# Patient Record
Sex: FEMALE | Race: BLACK OR AFRICAN AMERICAN | NOT HISPANIC OR LATINO | Employment: OTHER | URBAN - METROPOLITAN AREA
[De-identification: names, ages, dates, MRNs, and addresses within clinical notes are randomized per-mention and may not be internally consistent; named-entity substitution may affect disease eponyms.]

---

## 2017-03-13 ENCOUNTER — HISTORICAL (OUTPATIENT)
Dept: OCCUPATIONAL THERAPY | Facility: HOSPITAL | Age: 62
End: 2017-03-13

## 2017-03-24 ENCOUNTER — HISTORICAL (OUTPATIENT)
Dept: OCCUPATIONAL THERAPY | Facility: HOSPITAL | Age: 62
End: 2017-03-24

## 2017-03-27 ENCOUNTER — HISTORICAL (OUTPATIENT)
Dept: OCCUPATIONAL THERAPY | Facility: HOSPITAL | Age: 62
End: 2017-03-27

## 2017-03-29 ENCOUNTER — HISTORICAL (OUTPATIENT)
Dept: OCCUPATIONAL THERAPY | Facility: HOSPITAL | Age: 62
End: 2017-03-29

## 2017-04-05 ENCOUNTER — HISTORICAL (OUTPATIENT)
Dept: OCCUPATIONAL THERAPY | Facility: HOSPITAL | Age: 62
End: 2017-04-05

## 2017-04-07 ENCOUNTER — HISTORICAL (OUTPATIENT)
Dept: OCCUPATIONAL THERAPY | Facility: HOSPITAL | Age: 62
End: 2017-04-07

## 2017-04-10 ENCOUNTER — HISTORICAL (OUTPATIENT)
Dept: OCCUPATIONAL THERAPY | Facility: HOSPITAL | Age: 62
End: 2017-04-10

## 2017-04-12 ENCOUNTER — HISTORICAL (OUTPATIENT)
Dept: OCCUPATIONAL THERAPY | Facility: HOSPITAL | Age: 62
End: 2017-04-12

## 2017-08-22 ENCOUNTER — HISTORICAL (OUTPATIENT)
Dept: LAB | Facility: HOSPITAL | Age: 62
End: 2017-08-22

## 2017-08-22 LAB
ABS NEUT (OLG): 1.88
ALBUMIN SERPL-MCNC: 3.8 GM/DL (ref 3.4–5)
ALBUMIN/GLOB SERPL: 1 RATIO (ref 1.1–2)
ALP SERPL-CCNC: 46 UNIT/L (ref 50–136)
ALT SERPL-CCNC: 40 UNIT/L (ref 12–78)
AST SERPL-CCNC: 30 UNIT/L (ref 10–37)
BASOPHILS NFR BLD MANUAL: 1 % (ref 0–2)
BILIRUB SERPL-MCNC: 0.4 MG/DL (ref 0.2–1)
BILIRUBIN DIRECT+TOT PNL SERPL-MCNC: 0.17 MG/DL (ref 0.05–0.2)
BILIRUBIN DIRECT+TOT PNL SERPL-MCNC: 0.23 MG/DL
BUN SERPL-MCNC: 11 MG/DL (ref 7–18)
CALCIUM SERPL-MCNC: 10.2 MG/DL (ref 8.5–10.1)
CHLORIDE SERPL-SCNC: 105 MMOL/L (ref 98–107)
CHOLEST SERPL-MCNC: 151 MG/DL (ref 50–200)
CHOLEST/HDLC SERPL: 2 {RATIO} (ref 0–5)
CO2 SERPL-SCNC: 27.8 MMOL/L (ref 21–32)
CREAT SERPL-MCNC: 0.82 MG/DL (ref 0.55–1.02)
EOSINOPHIL NFR BLD MANUAL: 1 % (ref 0–8)
ERYTHROCYTE [DISTWIDTH] IN BLOOD BY AUTOMATED COUNT: 13 %
GLOBULIN SER-MCNC: 3.9 GM/DL (ref 2.4–3.5)
GLUCOSE SERPL-MCNC: 105 MG/DL (ref 74–106)
GRANULOCYTES NFR BLD MANUAL: 38 % (ref 47–80)
HCT VFR BLD AUTO: 38.7 % (ref 34–46)
HDLC SERPL-MCNC: 63 MG/DL (ref 35–60)
HGB BLD-MCNC: 12.8 GM/DL (ref 11.3–15.4)
LDLC SERPL CALC-MCNC: 67 MG/DL (ref 50–140)
LYMPHOCYTES NFR BLD MANUAL: 50 % (ref 13–40)
MCH RBC QN AUTO: 30.9 PG (ref 27–33)
MCHC RBC AUTO-ENTMCNC: 33.1 GM/DL (ref 32–35)
MCV RBC AUTO: 93.5 FL (ref 81–97)
MONOCYTES NFR BLD MANUAL: 10 % (ref 2–11)
PLATELET # BLD AUTO: 168 X10(3)/MCL (ref 151–368)
PLATELET # BLD EST: ADEQUATE 10*3/UL
PMV BLD AUTO: 10 FL
POTASSIUM SERPL-SCNC: 5 MMOL/L (ref 3.5–5.1)
PROT SERPL-MCNC: 7.7 GM/DL (ref 6.4–8.2)
RBC # BLD AUTO: 4.14 X10(6)/MCL (ref 3.9–5)
SODIUM SERPL-SCNC: 140 MMOL/L (ref 136–145)
TRIGL SERPL-MCNC: 106 MG/DL (ref 30–150)
VLDLC SERPL CALC-MCNC: 21 MG/DL
WBC # SPEC AUTO: 4.93 X10(3)/MCL (ref 3.4–9.2)

## 2017-09-11 ENCOUNTER — HISTORICAL (OUTPATIENT)
Dept: RADIOLOGY | Facility: HOSPITAL | Age: 62
End: 2017-09-11

## 2017-10-17 ENCOUNTER — HISTORICAL (OUTPATIENT)
Dept: RADIOLOGY | Facility: HOSPITAL | Age: 62
End: 2017-10-17

## 2018-08-15 ENCOUNTER — HISTORICAL (OUTPATIENT)
Dept: LAB | Facility: HOSPITAL | Age: 63
End: 2018-08-15

## 2018-08-15 LAB
ABS NEUT (OLG): 0.91
ALBUMIN SERPL-MCNC: 4.1 GM/DL (ref 3.4–5)
ALBUMIN/GLOB SERPL: 1.1 RATIO (ref 1.1–2)
ALP SERPL-CCNC: 47 UNIT/L (ref 46–116)
ALT SERPL-CCNC: 30 UNIT/L (ref 12–78)
AMYLASE SERPL-CCNC: 109 UNIT/L (ref 25–115)
AST SERPL-CCNC: 20 UNIT/L (ref 10–37)
BILIRUB SERPL-MCNC: 0.7 MG/DL (ref 0.2–1)
BILIRUBIN DIRECT+TOT PNL SERPL-MCNC: 0.3 MG/DL (ref 0–0.2)
BILIRUBIN DIRECT+TOT PNL SERPL-MCNC: 0.4 MG/DL
BUN SERPL-MCNC: 8 MG/DL (ref 7–18)
CALCIUM SERPL-MCNC: 10.5 MG/DL (ref 8.5–10.1)
CHLORIDE SERPL-SCNC: 103 MMOL/L (ref 98–107)
CHOLEST SERPL-MCNC: 179 MG/DL (ref 50–200)
CHOLEST/HDLC SERPL: 2 {RATIO} (ref 0–5)
CO2 SERPL-SCNC: 30.2 MMOL/L (ref 21–32)
CREAT SERPL-MCNC: 1 MG/DL (ref 0.55–1.02)
ERYTHROCYTE [DISTWIDTH] IN BLOOD BY AUTOMATED COUNT: 12 %
GLOBULIN SER-MCNC: 3.9 GM/DL (ref 2.4–3.5)
GLUCOSE SERPL-MCNC: 101 MG/DL (ref 74–106)
GRANULOCYTES NFR BLD MANUAL: 28 % (ref 47–80)
HCT VFR BLD AUTO: 40.9 % (ref 34–46)
HDLC SERPL-MCNC: 74 MG/DL (ref 35–60)
HGB BLD-MCNC: 14 GM/DL (ref 11.3–15.4)
LDLC SERPL CALC-MCNC: 90 MG/DL (ref 50–140)
LYMPHOCYTES NFR BLD MANUAL: 62 % (ref 13–40)
MCH RBC QN AUTO: 31.2 PG (ref 27–33)
MCHC RBC AUTO-ENTMCNC: 34.2 GM/DL (ref 32–35)
MCV RBC AUTO: 91.1 FL (ref 81–97)
MONOCYTES NFR BLD MANUAL: 9 % (ref 2–11)
NEUTS BAND NFR BLD MANUAL: 1 % (ref 0–11)
PLATELET # BLD AUTO: 197 X10(3)/MCL (ref 151–368)
PLATELET # BLD EST: ADEQUATE 10*3/UL
PMV BLD AUTO: 10 FL
POTASSIUM SERPL-SCNC: 4.7 MMOL/L (ref 3.5–5.1)
PROT SERPL-MCNC: 8 GM/DL (ref 6.4–8.2)
RBC # BLD AUTO: 4.49 X10(6)/MCL (ref 3.9–5)
SODIUM SERPL-SCNC: 139 MMOL/L (ref 136–145)
TRIGL SERPL-MCNC: 76 MG/DL (ref 30–150)
VLDLC SERPL CALC-MCNC: 15 MG/DL
WBC # SPEC AUTO: 3.96 X10(3)/MCL (ref 3.4–9.2)

## 2018-08-21 ENCOUNTER — HISTORICAL (OUTPATIENT)
Dept: RADIOLOGY | Facility: HOSPITAL | Age: 63
End: 2018-08-21

## 2020-01-23 ENCOUNTER — HOSPITAL ENCOUNTER (OUTPATIENT)
Dept: MEDSURG UNIT | Facility: HOSPITAL | Age: 65
End: 2020-01-25
Attending: INTERNAL MEDICINE | Admitting: FAMILY MEDICINE

## 2020-01-24 LAB — VANCOMYCIN TROUGH SERPL-MCNC: 6.5 UG/ML (ref 5–10)

## 2020-01-25 LAB
ABS NEUT (OLG): 2.16
ALBUMIN SERPL-MCNC: 2.9 GM/DL (ref 3.2–4.6)
ALBUMIN/GLOB SERPL: 0.7 RATIO (ref 1.1–2)
ALP SERPL-CCNC: 44 UNIT/L (ref 40–150)
ALT SERPL-CCNC: 12 UNIT/L (ref 0–55)
AST SERPL-CCNC: 14 UNIT/L (ref 5–34)
BASOPHILS NFR BLD MANUAL: 1 % (ref 0–2)
BILIRUB SERPL-MCNC: 0.3 MG/DL
BILIRUBIN DIRECT+TOT PNL SERPL-MCNC: 0.1 MG/DL
BILIRUBIN DIRECT+TOT PNL SERPL-MCNC: 0.2 MG/DL (ref 0–0.5)
BUN SERPL-MCNC: 4 MG/DL (ref 9.8–20.1)
CALCIUM SERPL-MCNC: 9.8 MG/DL (ref 8.4–10.2)
CHLORIDE SERPL-SCNC: 107 MMOL/L (ref 98–107)
CO2 SERPL-SCNC: 28 MEQ/L (ref 23–31)
CREAT SERPL-MCNC: 0.63 MG/DL (ref 0.55–1.02)
EOSINOPHIL NFR BLD MANUAL: 2 % (ref 0–8)
ERYTHROCYTE [DISTWIDTH] IN BLOOD BY AUTOMATED COUNT: 12 %
GLOBULIN SER-MCNC: 4 GM/DL (ref 2.4–3.5)
GLUCOSE SERPL-MCNC: 95 MG/DL (ref 82–115)
GRANULOCYTES NFR BLD MANUAL: 48 % (ref 47–80)
HCT VFR BLD AUTO: 35.2 % (ref 34–46)
HGB BLD-MCNC: 11.2 GM/DL (ref 11.3–15.4)
LYMPHOCYTES NFR BLD MANUAL: 33 % (ref 13–40)
MAGNESIUM SERPL-MCNC: 1.92 MG/DL (ref 1.6–2.6)
MCH RBC QN AUTO: 30.4 PG (ref 27–33)
MCHC RBC AUTO-ENTMCNC: 31.8 GM/DL (ref 32–35)
MCV RBC AUTO: 95.4 FL (ref 81–97)
MONOCYTES NFR BLD MANUAL: 16 % (ref 2–11)
PLATELET # BLD AUTO: 195 X10(3)/MCL (ref 140–450)
PMV BLD AUTO: 9 FL
POTASSIUM SERPL-SCNC: 4 MMOL/L (ref 3.5–5.1)
PROT SERPL-MCNC: 6.9 GM/DL (ref 5.8–7.6)
RBC # BLD AUTO: 3.69 X10(6)/MCL (ref 3.9–5)
SODIUM SERPL-SCNC: 142 MMOL/L (ref 136–145)
VANCOMYCIN SERPL-MCNC: 9 UG/ML
WBC # SPEC AUTO: 4.53 X10(3)/MCL (ref 3.4–9.2)

## 2020-06-10 ENCOUNTER — HISTORICAL (OUTPATIENT)
Dept: RADIOLOGY | Facility: HOSPITAL | Age: 65
End: 2020-06-10

## 2020-07-30 ENCOUNTER — HISTORICAL (OUTPATIENT)
Dept: RADIOLOGY | Facility: HOSPITAL | Age: 65
End: 2020-07-30

## 2020-08-31 ENCOUNTER — HISTORICAL (OUTPATIENT)
Dept: LAB | Facility: HOSPITAL | Age: 65
End: 2020-08-31

## 2020-09-28 ENCOUNTER — HISTORICAL (OUTPATIENT)
Dept: LAB | Facility: HOSPITAL | Age: 65
End: 2020-09-28

## 2020-10-01 ENCOUNTER — HISTORICAL (OUTPATIENT)
Dept: MEDSURG UNIT | Facility: HOSPITAL | Age: 65
End: 2020-10-01

## 2020-10-01 LAB — CRC RECOMMENDATION EXT: NORMAL

## 2020-12-09 ENCOUNTER — HISTORICAL (OUTPATIENT)
Dept: LAB | Facility: HOSPITAL | Age: 65
End: 2020-12-09

## 2020-12-09 LAB
ABS NEUT (OLG): 1.96
ALBUMIN SERPL-MCNC: 3.9 GM/DL (ref 3.4–4.8)
ALBUMIN/GLOB SERPL: 1.1 RATIO (ref 1.1–2)
ALP SERPL-CCNC: 45 UNIT/L (ref 40–150)
ALT SERPL-CCNC: 28 UNIT/L (ref 0–55)
AST SERPL-CCNC: 26 UNIT/L (ref 5–34)
BASOPHILS # BLD AUTO: 0.03 X10(3)/MCL
BASOPHILS NFR BLD AUTO: 0.7 %
BILIRUB SERPL-MCNC: 0.5 MG/DL
BILIRUBIN DIRECT+TOT PNL SERPL-MCNC: 0.2 MG/DL (ref 0–0.5)
BILIRUBIN DIRECT+TOT PNL SERPL-MCNC: 0.3 MG/DL
BUN SERPL-MCNC: 8 MG/DL (ref 9.8–20.1)
CALCIUM SERPL-MCNC: 10.2 MG/DL (ref 8.4–10.2)
CHLORIDE SERPL-SCNC: 105 MMOL/L (ref 98–107)
CHOLEST SERPL-MCNC: 183 MG/DL
CHOLEST/HDLC SERPL: 3 {RATIO} (ref 0–5)
CO2 SERPL-SCNC: 30 MEQ/L (ref 23–31)
CREAT SERPL-MCNC: 0.77 MG/DL (ref 0.55–1.02)
EOSINOPHIL # BLD AUTO: 0.06 X10(3)/MCL
EOSINOPHIL NFR BLD AUTO: 1.5 %
ERYTHROCYTE [DISTWIDTH] IN BLOOD BY AUTOMATED COUNT: 13 %
GLOBULIN SER-MCNC: 3.4 GM/DL (ref 2.4–3.5)
GLUCOSE SERPL-MCNC: 97 MG/DL (ref 82–115)
HCT VFR BLD AUTO: 40.4 % (ref 34–46)
HDLC SERPL-MCNC: 60 MG/DL (ref 35–60)
HGB BLD-MCNC: 12.6 GM/DL (ref 11.3–15.4)
IMM GRANULOCYTES # BLD AUTO: 0 10*3/UL (ref 0–0.1)
IMM GRANULOCYTES NFR BLD AUTO: 0 % (ref 0–1)
LDLC SERPL CALC-MCNC: 80 MG/DL (ref 50–140)
LYMPHOCYTES # BLD AUTO: 1.66 X10(3)/MCL
LYMPHOCYTES NFR BLD AUTO: 40.7 %
MCH RBC QN AUTO: 29.9 PG (ref 27–33)
MCHC RBC AUTO-ENTMCNC: 31.2 GM/DL (ref 32–35)
MCV RBC AUTO: 95.7 FL (ref 81–97)
MONOCYTES # BLD AUTO: 0.37 X10(3)/MCL
MONOCYTES NFR BLD AUTO: 9.1 %
NEUTROPHILS # BLD AUTO: 1.96 X10(3)/MCL
NEUTROPHILS NFR BLD AUTO: 48 %
PLATELET # BLD AUTO: 187 X10(3)/MCL (ref 140–450)
PMV BLD AUTO: 11 FL
POTASSIUM SERPL-SCNC: 4.8 MMOL/L (ref 3.5–5.1)
PROT SERPL-MCNC: 7.3 GM/DL (ref 5.8–7.6)
RBC # BLD AUTO: 4.22 X10(6)/MCL (ref 3.9–5)
SODIUM SERPL-SCNC: 141 MMOL/L (ref 136–145)
TRIGL SERPL-MCNC: 215 MG/DL (ref 37–140)
VLDLC SERPL CALC-MCNC: 43 MG/DL
WBC # SPEC AUTO: 4.08 X10(3)/MCL (ref 3.4–9.2)

## 2021-02-02 ENCOUNTER — HISTORICAL (OUTPATIENT)
Dept: HEPATOLOGY | Facility: HOSPITAL | Age: 66
End: 2021-02-02

## 2021-07-21 ENCOUNTER — HISTORICAL (OUTPATIENT)
Dept: RADIOLOGY | Facility: HOSPITAL | Age: 66
End: 2021-07-21

## 2021-08-17 ENCOUNTER — HISTORICAL (OUTPATIENT)
Dept: RADIOLOGY | Facility: HOSPITAL | Age: 66
End: 2021-08-17

## 2021-08-17 LAB — POC CREATININE: 0.7 MG/DL (ref 0.6–1.3)

## 2022-02-24 ENCOUNTER — HISTORICAL (OUTPATIENT)
Dept: LAB | Facility: HOSPITAL | Age: 67
End: 2022-02-24

## 2022-02-24 LAB
ABS NEUT (OLG): 1.16
ALBUMIN SERPL-MCNC: 4.1 G/DL (ref 3.4–4.8)
ALBUMIN/GLOB SERPL: 1.1 {RATIO} (ref 1.1–2)
ALP SERPL-CCNC: 42 U/L (ref 40–150)
ALT SERPL-CCNC: 24 U/L (ref 0–55)
AST SERPL-CCNC: 29 U/L (ref 5–34)
BILIRUB SERPL-MCNC: 0.7 MG/DL
BILIRUBIN DIRECT+TOT PNL SERPL-MCNC: 0.3
BILIRUBIN DIRECT+TOT PNL SERPL-MCNC: 0.4 (ref 0–0.5)
BUN SERPL-MCNC: 15 MG/DL (ref 9.8–20.1)
CALCIUM SERPL-MCNC: 11.1 MG/DL (ref 8.7–10.5)
CHLORIDE SERPL-SCNC: 108 MMOL/L (ref 98–107)
CHOLEST SERPL-MCNC: 174 MG/DL
CHOLEST/HDLC SERPL: 2 {RATIO} (ref 0–5)
CO2 SERPL-SCNC: 29 MMOL/L (ref 23–31)
CREAT SERPL-MCNC: 0.86 MG/DL (ref 0.55–1.02)
EOSINOPHIL NFR BLD MANUAL: 4 % (ref 0–8)
ERYTHROCYTE [DISTWIDTH] IN BLOOD BY AUTOMATED COUNT: 13 %
GLOBULIN SER-MCNC: 3.8 G/DL (ref 2.4–3.5)
GLUCOSE SERPL-MCNC: 97 MG/DL (ref 82–115)
GRANULOCYTES NFR BLD MANUAL: 37 % (ref 47–80)
HCT VFR BLD AUTO: 41 % (ref 34–46)
HDLC SERPL-MCNC: 75 MG/DL (ref 35–60)
HEMOLYSIS INTERF INDEX SERPL-ACNC: 9
HGB BLD-MCNC: 13 G/DL (ref 11.3–15.4)
HYPOCHROMIA BLD QL SMEAR: NORMAL
ICTERIC INTERF INDEX SERPL-ACNC: 1
LDLC SERPL CALC-MCNC: 88 MG/DL (ref 50–140)
LIPEMIC INTERF INDEX SERPL-ACNC: -1
LYMPHOCYTES NFR BLD MANUAL: 41 % (ref 13–40)
MANUAL DIFF? (OHS): YES
MCH RBC QN AUTO: 29.9 PG (ref 27–33)
MCHC RBC AUTO-ENTMCNC: 31.7 G/DL (ref 32–35)
MCV RBC AUTO: 94.3 FL (ref 81–97)
MONOCYTES NFR BLD MANUAL: 18 % (ref 2–11)
PLATELET # BLD AUTO: 179 10*3/UL (ref 140–450)
PLATELET # BLD EST: ADEQUATE 10*3/UL
PMV BLD AUTO: 10 FL
POTASSIUM SERPL-SCNC: 5.1 MMOL/L (ref 3.5–5.1)
PROT SERPL-MCNC: 7.9 G/DL (ref 5.8–7.6)
RBC # BLD AUTO: 4.35 10*6/UL (ref 3.9–5)
SODIUM SERPL-SCNC: 144 MMOL/L (ref 136–145)
TRIGL SERPL-MCNC: 55 MG/DL (ref 37–140)
VLDLC SERPL CALC-MCNC: 11 MG/DL
WBC # SPEC AUTO: 3.42 10*3/UL (ref 3.4–9.2)

## 2022-03-10 ENCOUNTER — HISTORICAL (OUTPATIENT)
Dept: RADIOLOGY | Facility: HOSPITAL | Age: 67
End: 2022-03-10

## 2022-03-10 ENCOUNTER — HISTORICAL (OUTPATIENT)
Dept: ADMINISTRATIVE | Facility: HOSPITAL | Age: 67
End: 2022-03-10

## 2022-04-09 ENCOUNTER — HISTORICAL (OUTPATIENT)
Dept: ADMINISTRATIVE | Facility: HOSPITAL | Age: 67
End: 2022-04-09
Payer: MEDICARE

## 2022-04-21 ENCOUNTER — HISTORICAL (OUTPATIENT)
Dept: ADMINISTRATIVE | Facility: HOSPITAL | Age: 67
End: 2022-04-21
Payer: MEDICARE

## 2022-04-21 ENCOUNTER — HISTORICAL (OUTPATIENT)
Dept: RADIOLOGY | Facility: HOSPITAL | Age: 67
End: 2022-04-21
Payer: MEDICARE

## 2022-04-27 VITALS
DIASTOLIC BLOOD PRESSURE: 78 MMHG | OXYGEN SATURATION: 100 % | HEIGHT: 69 IN | SYSTOLIC BLOOD PRESSURE: 120 MMHG | WEIGHT: 143.75 LBS | BODY MASS INDEX: 21.29 KG/M2

## 2022-04-30 NOTE — PROGRESS NOTES
Patient:   Sandra Stephens             MRN: [id number]            FIN: [id number]               Age:   65 years     Sex:  Female     :  1955   Associated Diagnoses:   None   Author:   Isacc FERRER, Rios Wong      Several attempt were made to notify patient of MRI results.  Unable to get in touch with patient

## 2022-04-30 NOTE — H&P
Patient:   Sandra Stephens             MRN: [id number]            FIN: [id number]               Age:   65 years     Sex:  Female     :  1955   Associated Diagnoses:   None   Author:   Castro Holm MD      Chief Complaint   screening colonoscopy      Review of Systems   Constitutional:  Negative.    Respiratory:  Negative.    Cardiovascular:  Negative.    Gastrointestinal:  Negative.    Genitourinary:  Negative.    Immunologic:  Negative.    Musculoskeletal:  Negative.       Health Status   Allergies:    Allergies (1) Active Reaction  No Known Allergies None Documented     Current medications:  (Selected)   Inpatient Medications  Ordered  IVF Normal Saline NS Infusion 500 mL: 500 mL, 500 mL, IV, 50 mL/hr, start date 10/01/20 8:33:00 CDT  Prescriptions  Prescribed  nabumetone 500 mg oral tablet: 500 mg = 1 tab(s), Oral, BID, # 60 tab(s), 0 Refill(s), [facility], cm, Height/Length Dosing, 20 12:39:00 CDT, 63.6, kg, Weight Dosing, 20 12:39:00 CDT  Documented Medications  Documented  GABAPENTIN   CAP 300M mg = 1 cap(s), Oral, TID  acetaminophen-hydrocodone 325 mg-10 mg oral tablet: 1 tab(s), Oral, QID  cyclobenzaprine 10 mg oral tablet: 10 mg = 1 tab(s), Oral, Once, 0 Refill(s)  fluoxetine 10 mg oral capsule: 10 mg = 1 cap(s), Oral, Daily  ketorolac: 10 mg, Oral, Once, 0 Refill(s)  prednisONE 10 mg oral tablet: 60 mg = 6 tab(s), Oral, Once-NOW, 0 Refill(s)      Histories   Past Medical History:    Active  Bursitis ([id number])  Hypertension ([id number])  Sciatic nerve ([id number])  Anxiety ([id number])   Family History:    Diabetes mellitus type 2  Mother  Hypertension.  Mother  Glaucoma.  Mother     Procedure history:    hysterectomy.  Tonsillectomy and adenoidectomy; younger than age 14 (26859).  Appendectomy; (06484).  Cholecystectomy; (55960).  Arthroscopy, shoulder, surgical; with rotator cuff repair  (49233).   Social History        Social & Psychosocial Habits    Alcohol    Comment: denies - 02/15/2017 08:43 - Yobani SINGH, Felipe Gibbons    Employment/School  05/14/2020  Status: Disabled    Description: [employer] in [place],     Exercise  05/14/2020  Duration (average number of minutes): 0    Home/Environment  05/14/2020  Lives with: Alone    Nutrition/Health  05/14/2020  Home Diet Regular    Sexual  05/14/2020  Sexually active: No    Substance Use  06/28/2017  Use: Current    Type: Marijuana    Frequency: 1-2 times per week    Tobacco  08/31/2020  Use: 5-9 cigarettes (between 1    Type: Cigarettes    Patient Wants Consult For Cessation Counseling N/A    Tobacco use per day: 10    Started at age: 20 Years    Stopped at age: 64 Years    09/28/2020  Use: 5-9 cigarettes (between 1    Patient Wants Consult For Cessation Counseling No    10/01/2020  Use: 5-9 cigarettes (between 1    Patient Wants Consult For Cessation Counseling No    Abuse/Neglect  08/31/2020  SHX Any signs of abuse or neglect No    Feels unsafe at home: No    Safe place to go: Yes    09/28/2020  SHX Any signs of abuse or neglect No    10/01/2020  SHX Any signs of abuse or neglect No    Spiritual/Cultural  05/14/2020  [employer]  .        Physical Examination   Reviewed Results: Vital Signs(Date Range: 9/30/2020 0:00 CDT - 10/1/2020 9:01 CDT)   General:  Alert and oriented, No acute distress.    Eye:  Extraocular movements are intact.    HENT:  Normocephalic.    Neck:  Supple, Non-tender.    Respiratory:  Lungs are clear to auscultation, Respirations are non-labored, Breath sounds are equal.    Cardiovascular:  Normal rate, Regular rhythm, No murmur.    Gastrointestinal:  Soft, Non-tender, Non-distended, Normal bowel sounds.    Genitourinary:  No costovertebral angle tenderness.       Impression and Plan   1. Screening colonoscopy

## 2022-05-02 NOTE — HISTORICAL [facility] CERNER
This is a historical note converted from [facility]. Formatting and pictures may have been removed.  Please reference [facility] for original formatting and attached multimedia. Chief Complaint  chest pain  History of Present Illness  63yo female presents to the ED c/o chest pain with cough and congestion.? She said that she had the Fl in December and never really got over it.? The chest pain is worse with deep breaths and coughing.? No fever/chills although her temp was 100.2 upon arrival.? No N/V/D/C.? She does have some SOB but mild.? WBC is normal.? CXR shows some B/L lower lobe infiltrates.? CTA was also obtained which was negative for PE.? Cardiac enzymes were negative as well.? She will be admitted to observation for further treatment.  Review of Systems  ?  ?????Constitutional: ?No fever, No chills, No sweats, No weakness, No fatigue. ?  ?????Eye: ?No double vision, No dry eyes, No photophobia. ?  ?????Ear/Nose/Mouth/Throat: ?No ear pain, No nasal congestion, No sore throat. ?  ?????Respiratory:??+shortness of breath,?+ cough, No sputum production, No hemoptysis, No wheezing,?+ pleuritic pain. ?  ?????Cardiovascular:??+ chest pain, No palpitations, No peripheral edema, No syncope. ?  ?????Gastrointestinal: ?No nausea, No vomiting, No diarrhea, No constipation, No heartburn, No abdominal pain. ?  ?????Genitourinary: ?No dysuria, No hematuria, No change in urine stream. ?  ?????Hematology/Lymphatics: ?No anemia, No bruising tendency, No bruise, No hemorrhage, No petechiae. ?  ?????Endocrine: ?No excessive thirst, No polyuria, No cold intolerance. ?  ?????Immunologic: ?No recurrent fevers, No recurrent infections. ?  ?????Musculoskeletal: ?Joint pain, No back pain, No muscle pain, No decreased range of motion. ?  ?????Integumentary: ?No rash, No pruritus, No petechiae, No skin lesion. ?  ?????Neurologic: ?Alert and oriented X4, No abnormal balance, No confusion, No tingling. ?  ?????Psychiatric: ?No anxiety, No  depression. ?  Physical Exam  Vitals & Measurements  T:?37.1? ?C (Oral)? TMIN:?36.5? ?C (Oral)? TMAX:?37.1? ?C (Oral)? HR:?93(Monitored)? RR:?20? BP:?119/82? SpO2:?95%? WT:?63.8?kg? BMI:?21.39?  General: ?Alert and oriented, No acute distress. ?  ??????? ? Appearance: Well nourished. ?  ??????? ? Behavior: Appropriate. ?  ??????? ? Skin: Normal for ethnicity. ?  ?????Eye: ?Pupils are equal, round and reactive to light, Extraocular movements are intact. ?  ?????HENT: ?Normocephalic, Normal hearing, Oral mucosa is moist, No pharyngeal erythema. ?  ?????Neck: ?Supple, Non-tender, No carotid bruit, No jugular venous distention, No lymphadenopathy, No thyromegaly. ?  ?????Respiratory:??mild congestion at bases,?Breath sounds are equal, No chest wall tenderness. ?  ?????Cardiovascular: ?Normal rate, Regular rhythm, No murmur, No gallop, Good pulses equal in all extremities, Normal peripheral perfusion, No edema. ?  ?????Gastrointestinal: ?Soft, Non-tender, Non-distended, Normal bowel sounds, No organomegaly. ?  ?????Genitourinary: ?No costovertebral angle tenderness, No urethral discharge. ?  ?????Lymphatics: ?No lymphadenopathy neck, axilla, groin. ?  ?????Musculoskeletal: ?Normal range of motion, Normal strength, No tenderness, No swelling, Normal gait. ?  ?????Integumentary: ?Warm, Dry, Pink, Intact, No rash. ?  ?????Neurologic: ?Alert, Oriented, Normal sensory, Normal motor function, No focal deficits, Cranial Nerves II-XII are grossly intact. ?  ?????Psychiatric: ?Cooperative, Appropriate mood & affect, Normal judgment. ?  Assessment/Plan  1.?Pneumonia?J18.9  2.?Hypertension?I10  3.?Tobacco user?Z72.0  4.?Chest pain?R07.9  5.?Anxiety?F41.9  Orders:  Vancomycin Level Trough, Timed collect, 01/24/20 5:00:00 CST, Blood, Stop date 01/24/20 5:00:00 CST, Lab Collect, 01/24/20 5:00:00 CST  Admit to observation on 1/23/20 at 04:54  IV fluids  DVT prophylaxis  antibiotics  nebs  follow labs  serial cardiac enzymes  review  home meds  muccinex  advised to stop smoking, will offer nicotine patch.? unsure about wanting to stop(cessation=4mins)   Problem List/Past Medical History  Ongoing  Anxiety  Bursitis  Hypertension  Sciatic nerve  Tobacco user  Tobacco user  Tobacco user  Historical  No qualifying data  Procedure/Surgical History  Appendectomy;  Cholecystectomy;  hysterectomy  Tonsillectomy and adenoidectomy; younger than age 14   Medications  Inpatient  acetaminophen, 1000 mg= 2 tab(s), Oral, q6hr, PRN  albuterol, 2.5 mg= 3 mL, NEB, q4hr Resp  azithromycin 250 mg oral tablet, 500 mg= 2 tab(s), Oral, Daily  Dextrose 50% and Water (50 mL vial/syringe), 12.5 gm= 25 mL, IV Push, Once, PRN  Dextrose 50% and Water (50 mL vial/syringe), 12.5 gm= 25 mL, IV Push, As Directed, PRN  Dextrose 50% and Water (50 mL vial/syringe), 25 gm= 50 mL, IV Push, As Directed, PRN  Dextrose 50% in Water intravenous solution, 12.5 gm= 25 mL, IV Push, As Directed, PRN  glucagon, 1 mg= 1 EA, IM, q10min, PRN  glucagon, 1 mg= 1 EA, IM, q10min, PRN  Humulin R (for Custom Sliding Scale), 2-14 units, Subcutaneous, As Directed, PRN  Lovenox, 40 mg= 0.4 mL, Subcutaneous, Daily  morphine, 2 mg= 1 mL, IV, q6hr, PRN  Protonix, 40 mg= 1 tab(s), Oral, Daily  Rocephin 1 gm/D5W 50 mL IVPB (Premix), 1 gm= 50 mL, IV, Daily  Sodium Chloride 0.9% intravenous solution 1,000 mL, 1000 mL, IV  Toradol, 30 mg= 1 mL, IV Push, q6hr, PRN  Vancomycin (for IVPB)  Vasotec, 2.5 mg= 2 mL, IV Push, q6hr, PRN  Zofran, 4 mg= 2 mL, IV Push, q4hr, PRN  Home  AMLODIPINE BESYLATE 5 MG TAB, 5 mg= 1 tab(s), Oral, Daily,? ?Not taking  GABAPENTIN CAP 300MG, 300 mg= 1 cap(s), Oral, TID  HYDROCO/APAP TAB 10-325MG, 1 tab(s), Oral, BID  Allergies  No Known Allergies  Social History  Abuse/Neglect  No, No, Yes, 01/23/2020  No, 01/23/2020  No, 01/23/2020  Alcohol  Substance Use  Current, Marijuana, 1-2 times per week, 06/28/2017  Tobacco  Smoker, current status unknown, N/A, 01/23/2020  10 or more  cigarettes (1/2 pack or more)/day in last 30 days, N/A, 01/23/2020  Family History  HTN  Lab Results  Test Name Test Result Date/Time   Chloride 102 mmol/L 01/23/2020 01:35 CST   CO2 24 mEq/L 01/23/2020 01:35 CST   BUN 9.0 mg/dL (Low) 01/23/2020 01:35 CST   Creatinine 0.75 mg/dL 01/23/2020 01:35 CST   WBC 7.17 x10(3)/mcL 01/23/2020 01:35 CST   Hgb 12.0 gm/dL 01/23/2020 01:35 CST   Hct 36.9 % 01/23/2020 01:35 CST   Platelet 206 x10(3)/mcL 01/23/2020 01:35 CST

## 2022-05-20 ENCOUNTER — OFFICE VISIT (OUTPATIENT)
Dept: FAMILY MEDICINE | Facility: CLINIC | Age: 67
End: 2022-05-20
Payer: MEDICARE

## 2022-05-20 ENCOUNTER — HOSPITAL ENCOUNTER (OUTPATIENT)
Dept: RADIOLOGY | Facility: HOSPITAL | Age: 67
Discharge: HOME | End: 2022-05-20
Attending: FAMILY MEDICINE
Payer: MEDICAID

## 2022-05-20 VITALS
HEIGHT: 69 IN | TEMPERATURE: 98 F | DIASTOLIC BLOOD PRESSURE: 72 MMHG | OXYGEN SATURATION: 100 % | SYSTOLIC BLOOD PRESSURE: 112 MMHG | BODY MASS INDEX: 21.8 KG/M2 | WEIGHT: 147.19 LBS | RESPIRATION RATE: 18 BRPM | HEART RATE: 72 BPM

## 2022-05-20 DIAGNOSIS — F32.A DEPRESSION, UNSPECIFIED DEPRESSION TYPE: ICD-10-CM

## 2022-05-20 DIAGNOSIS — M79.641 RIGHT HAND PAIN: Primary | ICD-10-CM

## 2022-05-20 DIAGNOSIS — M79.671 RIGHT FOOT PAIN: ICD-10-CM

## 2022-05-20 PROBLEM — F41.9 ANXIETY: Status: ACTIVE | Noted: 2022-05-20

## 2022-05-20 PROBLEM — I10 HYPERTENSION: Status: ACTIVE | Noted: 2022-05-20

## 2022-05-20 PROBLEM — Z72.0 TOBACCO USER: Status: ACTIVE | Noted: 2022-05-20

## 2022-05-20 PROBLEM — M54.50 LOW BACK PAIN: Status: ACTIVE | Noted: 2022-05-20

## 2022-05-20 PROCEDURE — 99214 OFFICE O/P EST MOD 30 MIN: CPT | Mod: ,,, | Performed by: FAMILY MEDICINE

## 2022-05-20 PROCEDURE — 99214 PR OFFICE/OUTPT VISIT, EST, LEVL IV, 30-39 MIN: ICD-10-PCS | Mod: ,,, | Performed by: FAMILY MEDICINE

## 2022-05-20 PROCEDURE — 3008F PR BODY MASS INDEX (BMI) DOCUMENTED: ICD-10-PCS | Mod: CPTII,,, | Performed by: FAMILY MEDICINE

## 2022-05-20 PROCEDURE — 1160F RVW MEDS BY RX/DR IN RCRD: CPT | Mod: CPTII,,, | Performed by: FAMILY MEDICINE

## 2022-05-20 PROCEDURE — 73630 X-RAY EXAM OF FOOT: CPT | Mod: TC,RT

## 2022-05-20 PROCEDURE — 3008F BODY MASS INDEX DOCD: CPT | Mod: CPTII,,, | Performed by: FAMILY MEDICINE

## 2022-05-20 PROCEDURE — 3078F DIAST BP <80 MM HG: CPT | Mod: CPTII,,, | Performed by: FAMILY MEDICINE

## 2022-05-20 PROCEDURE — 1159F PR MEDICATION LIST DOCUMENTED IN MEDICAL RECORD: ICD-10-PCS | Mod: CPTII,,, | Performed by: FAMILY MEDICINE

## 2022-05-20 PROCEDURE — 3074F PR MOST RECENT SYSTOLIC BLOOD PRESSURE < 130 MM HG: ICD-10-PCS | Mod: CPTII,,, | Performed by: FAMILY MEDICINE

## 2022-05-20 PROCEDURE — 3074F SYST BP LT 130 MM HG: CPT | Mod: CPTII,,, | Performed by: FAMILY MEDICINE

## 2022-05-20 PROCEDURE — 1159F MED LIST DOCD IN RCRD: CPT | Mod: CPTII,,, | Performed by: FAMILY MEDICINE

## 2022-05-20 PROCEDURE — 1160F PR REVIEW ALL MEDS BY PRESCRIBER/CLIN PHARMACIST DOCUMENTED: ICD-10-PCS | Mod: CPTII,,, | Performed by: FAMILY MEDICINE

## 2022-05-20 PROCEDURE — 3078F PR MOST RECENT DIASTOLIC BLOOD PRESSURE < 80 MM HG: ICD-10-PCS | Mod: CPTII,,, | Performed by: FAMILY MEDICINE

## 2022-05-20 RX ORDER — NABUMETONE 500 MG/1
TABLET, FILM COATED ORAL
COMMUNITY
Start: 2022-04-21 | End: 2022-10-12 | Stop reason: SDUPTHER

## 2022-05-20 RX ORDER — DULOXETIN HYDROCHLORIDE 30 MG/1
30 CAPSULE, DELAYED RELEASE ORAL DAILY
Qty: 30 CAPSULE | Refills: 1 | Status: SHIPPED | OUTPATIENT
Start: 2022-05-20 | End: 2022-07-21

## 2022-05-20 RX ORDER — GABAPENTIN 600 MG/1
TABLET ORAL
COMMUNITY
Start: 2022-03-15 | End: 2022-10-10 | Stop reason: SDUPTHER

## 2022-05-20 RX ORDER — PANTOPRAZOLE SODIUM 40 MG/1
TABLET, DELAYED RELEASE ORAL
COMMUNITY
Start: 2022-04-22 | End: 2022-09-16

## 2022-05-20 RX ORDER — MELOXICAM 7.5 MG/1
TABLET ORAL
COMMUNITY
Start: 2022-03-15 | End: 2022-07-21

## 2022-05-20 NOTE — PROGRESS NOTES
"Subjective:       Patient ID: Sandra Stephens is a 66 y.o. female.    Chief Complaint: possible broken left great digit, right hand numbness, weak      HPI    Review of Systems   Constitutional: Negative.    HENT: Negative.    Respiratory: Negative.    Cardiovascular: Negative.    Gastrointestinal: Negative.    Genitourinary: Negative.    Musculoskeletal:        Right great toe pain: reports trauma approx 2 months ago, hit toe on cement, swelling, pain with ambulation    Right hand pain: numbness in right hand, present x 8 months, reports dropping objects, no recent trauma   Integumentary:  Negative.   Neurological: Negative.    Hematological: Negative.    Psychiatric/Behavioral:        Depression           Objective:      /72 (BP Location: Left arm, Patient Position: Sitting, BP Method: Large (Manual))   Pulse 72   Temp 97.5 °F (36.4 °C)   Resp 18   Ht 5' 9" (1.753 m)   Wt 66.8 kg (147 lb 3.2 oz)   SpO2 100%   BMI 21.74 kg/m²    Physical Exam  Constitutional:       General: She is not in acute distress.     Appearance: Normal appearance.   Cardiovascular:      Rate and Rhythm: Normal rate and regular rhythm.   Pulmonary:      Effort: Pulmonary effort is normal.      Breath sounds: Normal breath sounds.   Abdominal:      General: Abdomen is flat. Bowel sounds are normal.      Palpations: Abdomen is soft.   Musculoskeletal:         General: Normal range of motion.      Right hand: Normal. No swelling, deformity, tenderness or bony tenderness. Normal range of motion. Normal pulse.      Right foot: Deformity (first metatarsal: TTP, swelling present) present.        Feet:       Comments: +2 radial pulse   Feet:      Right foot:      Skin integrity: Skin integrity normal.   Neurological:      General: No focal deficit present.      Mental Status: She is alert and oriented to person, place, and time.   Psychiatric:         Mood and Affect: Mood normal.         Behavior: Behavior normal.         Thought Content: " Thought content normal.         Judgment: Judgment normal.             Assessment:       Problem List Items Addressed This Visit        Psychiatric    Depression    Relevant Medications    DULoxetine (CYMBALTA) 30 MG capsule      Other Visit Diagnoses     Right hand pain    -  Primary    Relevant Orders    Ambulatory referral/consult to Neurology    Right foot pain        Relevant Orders    X-Ray Foot Complete Right           Plan:     1. Right hand pain  Refer to neurology for EMG study    2. Right foot pain  Schedule right foot x-ray  Discussed podiatry consult pending x-ray results    3. Depression  Start Cymbalta 30 mg q.day next item relaxation technique  Monitor  Return to clinic with any concerns

## 2022-10-10 ENCOUNTER — OFFICE VISIT (OUTPATIENT)
Dept: FAMILY MEDICINE | Facility: CLINIC | Age: 67
End: 2022-10-10
Payer: MEDICARE

## 2022-10-10 VITALS
BODY MASS INDEX: 21.18 KG/M2 | DIASTOLIC BLOOD PRESSURE: 80 MMHG | WEIGHT: 143 LBS | RESPIRATION RATE: 18 BRPM | SYSTOLIC BLOOD PRESSURE: 138 MMHG | OXYGEN SATURATION: 100 % | TEMPERATURE: 97 F | HEIGHT: 69 IN | HEART RATE: 79 BPM

## 2022-10-10 DIAGNOSIS — F32.A DEPRESSION, UNSPECIFIED DEPRESSION TYPE: ICD-10-CM

## 2022-10-10 DIAGNOSIS — M54.12 CERVICAL RADICULOPATHY: Primary | ICD-10-CM

## 2022-10-10 DIAGNOSIS — R53.1 WEAKNESS: ICD-10-CM

## 2022-10-10 PROCEDURE — 3288F FALL RISK ASSESSMENT DOCD: CPT | Mod: CPTII,,, | Performed by: FAMILY MEDICINE

## 2022-10-10 PROCEDURE — 1159F MED LIST DOCD IN RCRD: CPT | Mod: CPTII,,, | Performed by: FAMILY MEDICINE

## 2022-10-10 PROCEDURE — 3008F PR BODY MASS INDEX (BMI) DOCUMENTED: ICD-10-PCS | Mod: CPTII,,, | Performed by: FAMILY MEDICINE

## 2022-10-10 PROCEDURE — 1160F RVW MEDS BY RX/DR IN RCRD: CPT | Mod: CPTII,,, | Performed by: FAMILY MEDICINE

## 2022-10-10 PROCEDURE — 3079F DIAST BP 80-89 MM HG: CPT | Mod: CPTII,,, | Performed by: FAMILY MEDICINE

## 2022-10-10 PROCEDURE — 99214 PR OFFICE/OUTPT VISIT, EST, LEVL IV, 30-39 MIN: ICD-10-PCS | Mod: ,,, | Performed by: FAMILY MEDICINE

## 2022-10-10 PROCEDURE — 3079F PR MOST RECENT DIASTOLIC BLOOD PRESSURE 80-89 MM HG: ICD-10-PCS | Mod: CPTII,,, | Performed by: FAMILY MEDICINE

## 2022-10-10 PROCEDURE — 3008F BODY MASS INDEX DOCD: CPT | Mod: CPTII,,, | Performed by: FAMILY MEDICINE

## 2022-10-10 PROCEDURE — 1159F PR MEDICATION LIST DOCUMENTED IN MEDICAL RECORD: ICD-10-PCS | Mod: CPTII,,, | Performed by: FAMILY MEDICINE

## 2022-10-10 PROCEDURE — 3288F PR FALLS RISK ASSESSMENT DOCUMENTED: ICD-10-PCS | Mod: CPTII,,, | Performed by: FAMILY MEDICINE

## 2022-10-10 PROCEDURE — 1100F PR PT FALLS ASSESS DOC 2+ FALLS/FALL W/INJURY/YR: ICD-10-PCS | Mod: CPTII,,, | Performed by: FAMILY MEDICINE

## 2022-10-10 PROCEDURE — 3075F PR MOST RECENT SYSTOLIC BLOOD PRESS GE 130-139MM HG: ICD-10-PCS | Mod: CPTII,,, | Performed by: FAMILY MEDICINE

## 2022-10-10 PROCEDURE — 99214 OFFICE O/P EST MOD 30 MIN: CPT | Mod: ,,, | Performed by: FAMILY MEDICINE

## 2022-10-10 PROCEDURE — 1100F PTFALLS ASSESS-DOCD GE2>/YR: CPT | Mod: CPTII,,, | Performed by: FAMILY MEDICINE

## 2022-10-10 PROCEDURE — 3075F SYST BP GE 130 - 139MM HG: CPT | Mod: CPTII,,, | Performed by: FAMILY MEDICINE

## 2022-10-10 PROCEDURE — 1160F PR REVIEW ALL MEDS BY PRESCRIBER/CLIN PHARMACIST DOCUMENTED: ICD-10-PCS | Mod: CPTII,,, | Performed by: FAMILY MEDICINE

## 2022-10-10 RX ORDER — PANTOPRAZOLE SODIUM 40 MG/1
40 TABLET, DELAYED RELEASE ORAL DAILY
Qty: 30 TABLET | Refills: 3 | Status: SHIPPED | OUTPATIENT
Start: 2022-10-10 | End: 2023-02-14

## 2022-10-10 RX ORDER — GABAPENTIN 600 MG/1
600 TABLET ORAL 3 TIMES DAILY
Qty: 270 TABLET | Refills: 1 | Status: SHIPPED | OUTPATIENT
Start: 2022-10-10 | End: 2023-02-14

## 2022-10-10 RX ORDER — DULOXETIN HYDROCHLORIDE 60 MG/1
60 CAPSULE, DELAYED RELEASE ORAL DAILY
Qty: 30 CAPSULE | Refills: 2 | Status: SHIPPED | OUTPATIENT
Start: 2022-10-10 | End: 2023-01-08

## 2022-10-10 NOTE — PROGRESS NOTES
"Subjective:       Patient ID: Sandra Stephens is a 67 y.o. female.    Chief Complaint: routine follow up and s/p 3 recent falls, pain in hip worsened since fall      Routine      Review of Systems   Constitutional: Negative.    Respiratory: Negative.     Cardiovascular: Negative.    Musculoskeletal:         Right hand pain: reports pain in neck   Neurological:         Frequent falls: reports hx of prior braces, using simple cane to aid with ambulation   Psychiatric/Behavioral:          Depression:  Patient reports medication no longer working as well         Objective:      /80 (BP Location: Left arm, Patient Position: Sitting, BP Method: Large (Manual))   Pulse 79   Temp 96.9 °F (36.1 °C)   Resp 18   Ht 5' 9" (1.753 m)   Wt 64.9 kg (143 lb)   SpO2 100%   BMI 21.12 kg/m²    Physical Exam  Constitutional:       Appearance: Normal appearance.   Cardiovascular:      Rate and Rhythm: Normal rate and regular rhythm.      Heart sounds: Normal heart sounds.   Pulmonary:      Effort: Pulmonary effort is normal.      Breath sounds: Normal breath sounds.   Musculoskeletal:      Comments: C-spine  Flexion equals 90°  Extension equals 60°  Rotation equals 80° bilaterally  Lateral flexion equals 45° bilaterally       Ambulating with the aid of simple can    Right arm:  +2 radial pulse, positive Tinel's   Neurological:      Mental Status: She is alert.   Psychiatric:         Mood and Affect: Mood normal.         Behavior: Behavior normal.         Thought Content: Thought content normal.         Judgment: Judgment normal.           Assessment:       Problem List Items Addressed This Visit          Psychiatric    Depression    Relevant Medications    DULoxetine (CYMBALTA) 60 MG capsule     Other Visit Diagnoses       Cervical radiculopathy    -  Primary    Relevant Orders    Ambulatory referral/consult to Neurology    Weakness                   Plan:   1. Cervical radiculopathy  -     Ambulatory referral/consult to " Neurology; Future; Expected date: 10/17/2022  Schedule EMG study with Neurology    2. Weakness  Patient for home health PT eval/treat    3. Depression, unspecified depression type  -     DULoxetine (CYMBALTA) 60 MG capsule; Take 1 capsule (60 mg total) by mouth once daily.  Dispense: 30 capsule; Refill: 2  Increase Cymbalta to 60 mg q.day  relaxation technique   monitor  return to clinic with any concerns    Other orders  -     gabapentin (NEURONTIN) 600 MG tablet; Take 1 tablet (600 mg total) by mouth 3 (three) times daily.  Dispense: 270 tablet; Refill: 1  -     pantoprazole (PROTONIX) 40 MG tablet; Take 1 tablet (40 mg total) by mouth once daily.  Dispense: 30 tablet; Refill: 3

## 2022-10-12 DIAGNOSIS — M54.12 CERVICAL RADICULOPATHY: Primary | ICD-10-CM

## 2022-10-12 DIAGNOSIS — F32.A DEPRESSION, UNSPECIFIED DEPRESSION TYPE: ICD-10-CM

## 2022-10-12 PROCEDURE — G0180 MD CERTIFICATION HHA PATIENT: HCPCS | Mod: ,,, | Performed by: FAMILY MEDICINE

## 2022-10-12 PROCEDURE — G0180 PR HOME HEALTH MD CERTIFICATION: ICD-10-PCS | Mod: ,,, | Performed by: FAMILY MEDICINE

## 2022-10-12 RX ORDER — MELOXICAM 7.5 MG/1
TABLET ORAL
Qty: 30 TABLET | Refills: 1 | Status: SHIPPED | OUTPATIENT
Start: 2022-10-12 | End: 2022-12-27

## 2022-10-12 RX ORDER — NABUMETONE 500 MG/1
500 TABLET, FILM COATED ORAL 2 TIMES DAILY
Qty: 60 TABLET | Refills: 1 | Status: SHIPPED | OUTPATIENT
Start: 2022-10-12

## 2022-10-20 ENCOUNTER — EXTERNAL HOME HEALTH (OUTPATIENT)
Dept: HOME HEALTH SERVICES | Facility: HOSPITAL | Age: 67
End: 2022-10-20
Payer: MEDICARE

## 2022-10-21 ENCOUNTER — DOCUMENT SCAN (OUTPATIENT)
Dept: HOME HEALTH SERVICES | Facility: HOSPITAL | Age: 67
End: 2022-10-21
Payer: MEDICARE

## 2022-10-28 ENCOUNTER — DOCUMENT SCAN (OUTPATIENT)
Dept: HOME HEALTH SERVICES | Facility: HOSPITAL | Age: 67
End: 2022-10-28
Payer: MEDICARE

## 2022-11-15 ENCOUNTER — HOSPITAL ENCOUNTER (EMERGENCY)
Facility: HOSPITAL | Age: 67
Discharge: HOME | End: 2022-11-15
Attending: STUDENT IN AN ORGANIZED HEALTH CARE EDUCATION/TRAINING PROGRAM
Payer: MEDICARE

## 2022-11-15 VITALS
RESPIRATION RATE: 18 BRPM | HEART RATE: 71 BPM | TEMPERATURE: 98 F | WEIGHT: 155 LBS | OXYGEN SATURATION: 96 % | BODY MASS INDEX: 23.49 KG/M2 | DIASTOLIC BLOOD PRESSURE: 102 MMHG | SYSTOLIC BLOOD PRESSURE: 160 MMHG | HEIGHT: 68 IN

## 2022-11-15 DIAGNOSIS — R52 PAIN: ICD-10-CM

## 2022-11-15 DIAGNOSIS — S20.212A CONTUSION OF LEFT FRONT WALL OF THORAX, INITIAL ENCOUNTER: Primary | ICD-10-CM

## 2022-11-15 LAB
ALBUMIN SERPL-MCNC: 4.2 GM/DL (ref 3.4–4.8)
ALBUMIN/GLOB SERPL: 1.2 RATIO (ref 1.1–2)
ALP SERPL-CCNC: 48 UNIT/L (ref 40–150)
ALT SERPL-CCNC: 23 UNIT/L (ref 0–55)
AST SERPL-CCNC: 29 UNIT/L (ref 5–34)
BASOPHILS # BLD AUTO: 0.03 X10(3)/MCL (ref 0–0.2)
BASOPHILS NFR BLD AUTO: 0.8 %
BILIRUBIN DIRECT+TOT PNL SERPL-MCNC: 0.6 MG/DL
BUN SERPL-MCNC: 14 MG/DL (ref 9.8–20.1)
CALCIUM SERPL-MCNC: 10.6 MG/DL (ref 8.4–10.2)
CHLORIDE SERPL-SCNC: 108 MMOL/L (ref 98–107)
CO2 SERPL-SCNC: 25 MMOL/L (ref 23–31)
CREAT SERPL-MCNC: 0.82 MG/DL (ref 0.55–1.02)
EOSINOPHIL # BLD AUTO: 0.07 X10(3)/MCL (ref 0–0.9)
EOSINOPHIL NFR BLD AUTO: 1.9 %
ERYTHROCYTE [DISTWIDTH] IN BLOOD BY AUTOMATED COUNT: 12.3 % (ref 11.5–17)
GFR SERPLBLD CREATININE-BSD FMLA CKD-EPI: >60 MLS/MIN/1.73/M2
GLOBULIN SER-MCNC: 3.5 GM/DL (ref 2.4–3.5)
GLUCOSE SERPL-MCNC: 100 MG/DL (ref 82–115)
HCT VFR BLD AUTO: 40.5 % (ref 37–47)
HGB BLD-MCNC: 13.4 GM/DL (ref 12–16)
IMM GRANULOCYTES # BLD AUTO: 0 X10(3)/MCL (ref 0–0.04)
IMM GRANULOCYTES NFR BLD AUTO: 0 %
LYMPHOCYTES # BLD AUTO: 1.61 X10(3)/MCL (ref 0.6–4.6)
LYMPHOCYTES NFR BLD AUTO: 43.3 %
MCH RBC QN AUTO: 31.1 PG (ref 27–31)
MCHC RBC AUTO-ENTMCNC: 33.1 MG/DL (ref 33–36)
MCV RBC AUTO: 94 FL (ref 80–94)
MONOCYTES # BLD AUTO: 0.4 X10(3)/MCL (ref 0.1–1.3)
MONOCYTES NFR BLD AUTO: 10.8 %
NEUTROPHILS # BLD AUTO: 1.6 X10(3)/MCL (ref 2.1–9.2)
NEUTROPHILS NFR BLD AUTO: 43.2 %
NRBC BLD AUTO-RTO: 0 %
PLATELET # BLD AUTO: 165 X10(3)/MCL (ref 130–400)
PMV BLD AUTO: 10.5 FL (ref 7.4–10.4)
POTASSIUM SERPL-SCNC: 5.1 MMOL/L (ref 3.5–5.1)
PROT SERPL-MCNC: 7.7 GM/DL (ref 5.8–7.6)
RBC # BLD AUTO: 4.31 X10(6)/MCL (ref 4.2–5.4)
SODIUM SERPL-SCNC: 141 MMOL/L (ref 136–145)
TROPONIN I SERPL-MCNC: <0.01 NG/ML (ref 0–0.04)
WBC # SPEC AUTO: 3.7 X10(3)/MCL (ref 4.5–11.5)

## 2022-11-15 PROCEDURE — 84484 ASSAY OF TROPONIN QUANT: CPT | Performed by: STUDENT IN AN ORGANIZED HEALTH CARE EDUCATION/TRAINING PROGRAM

## 2022-11-15 PROCEDURE — 25000003 PHARM REV CODE 250: Performed by: STUDENT IN AN ORGANIZED HEALTH CARE EDUCATION/TRAINING PROGRAM

## 2022-11-15 PROCEDURE — 99900031 HC PATIENT EDUCATION (STAT)

## 2022-11-15 PROCEDURE — 93005 ELECTROCARDIOGRAM TRACING: CPT

## 2022-11-15 PROCEDURE — 80053 COMPREHEN METABOLIC PANEL: CPT | Performed by: STUDENT IN AN ORGANIZED HEALTH CARE EDUCATION/TRAINING PROGRAM

## 2022-11-15 PROCEDURE — 99285 EMERGENCY DEPT VISIT HI MDM: CPT | Mod: 25

## 2022-11-15 PROCEDURE — 85025 COMPLETE CBC W/AUTO DIFF WBC: CPT | Performed by: STUDENT IN AN ORGANIZED HEALTH CARE EDUCATION/TRAINING PROGRAM

## 2022-11-15 RX ORDER — TRAMADOL HYDROCHLORIDE 50 MG/1
50 TABLET ORAL EVERY 6 HOURS PRN
Qty: 12 TABLET | Refills: 0 | Status: SHIPPED | OUTPATIENT
Start: 2022-11-15 | End: 2023-07-18

## 2022-11-15 RX ORDER — HYDROCODONE BITARTRATE AND ACETAMINOPHEN 10; 325 MG/1; MG/1
1 TABLET ORAL
Status: COMPLETED | OUTPATIENT
Start: 2022-11-15 | End: 2022-11-15

## 2022-11-15 RX ORDER — AMLODIPINE BESYLATE 5 MG/1
5 TABLET ORAL DAILY
Qty: 30 TABLET | Refills: 0 | Status: SHIPPED | OUTPATIENT
Start: 2022-11-15 | End: 2022-12-27 | Stop reason: SDUPTHER

## 2022-11-15 RX ADMIN — HYDROCODONE BITARTRATE AND ACETAMINOPHEN 1 TABLET: 10; 325 TABLET ORAL at 10:11

## 2022-11-15 NOTE — ED NOTES
[facility] Aa[facility]. Reports fall around 0430 this morning due to having sciatica pain in her right leg. Currently having left rib pain under her breast. Pt denies loc. Pt states it hurts to cough and take a deep breath. No bruising or any other signs of trauma noted. Pt in no distress. On monitors. Wctm

## 2022-11-15 NOTE — ED PROVIDER NOTES
"Encounter Date: 11/15/2022       History     Chief Complaint   Patient presents with    Fall     Fall this am at 4am, right leg gave out with pain to left rib area     Patient is a 67-year-old  female past medical history of anxiety, right-sided sciatica and asthma who presented to the ER today due to a fall that occurred 6 hours prior to arrival.  Patient states that she had some right leg pain when she went to get out of bed.  Patient states a chronic issue nothing new.  Patient states she sustained a mechanical fall as a result.  Patient states she fell onto her left side causing left inferior breast margin pain.  Patient has concerns she may have "broke a rib. "Patient rates the pain a 10/10 in severity and describes it as throbbing.  Patient has not taken anything for it.  Patient denies any notable shortness of breath, nausea, vomiting, diaphoresis.  Patient denies a significant cardiac history.  Patient states the pain is reproducible with palpation.  Patient denies any fever, chills, cough, congestion, abdominal pain, diarrhea, constipation.    Review of patient's allergies indicates:  No Known Allergies  Past Medical History:   Diagnosis Date    Anxiety     Hypertension     Sciatic nerve injury      Past Surgical History:   Procedure Laterality Date    ADENOIDECTOMY      APPENDECTOMY      CHOLECYSTECTOMY      TONSILLECTOMY       History reviewed. No pertinent family history.  Social History     Tobacco Use    Smoking status: Every Day     Packs/day: 0.25     Types: Cigarettes    Smokeless tobacco: Never   Substance Use Topics    Alcohol use: Yes     Review of Systems   Constitutional:  Negative for chills, fatigue and fever.   HENT:  Negative for congestion, sore throat and trouble swallowing.    Eyes:  Negative for pain and visual disturbance.   Respiratory:  Negative for cough, shortness of breath and wheezing.    Cardiovascular:  Negative for chest pain and palpitations. "   Gastrointestinal:  Negative for abdominal pain, blood in stool, constipation, diarrhea, nausea and vomiting.   Genitourinary:  Negative for dysuria and hematuria.   Musculoskeletal:  Negative for back pain and myalgias.   Skin:  Negative for rash and wound.   Neurological:  Negative for seizures, syncope and headaches.   Psychiatric/Behavioral:  Negative for confusion. The patient is not nervous/anxious.      Physical Exam     Initial Vitals [11/15/22 1007]   BP Pulse Resp Temp SpO2   (!) 191/120 85 20 98.4 °F (36.9 °C) 98 %      MAP       --         Physical Exam    Nursing note and vitals reviewed.  Constitutional: She appears well-developed and well-nourished. She is not diaphoretic. No distress.   HENT:   Head: Normocephalic.   Right Ear: External ear normal.   Left Ear: External ear normal.   Nose: Nose normal.   Eyes: Conjunctivae and EOM are normal. Right eye exhibits no discharge. Left eye exhibits no discharge. No scleral icterus.   Neck:   Normal range of motion.  Cardiovascular:  Normal rate, regular rhythm, normal heart sounds and intact distal pulses.     Exam reveals no gallop and no friction rub.       No murmur heard.  Pulmonary/Chest: Breath sounds normal. No stridor. No respiratory distress. She has no wheezes. She has no rhonchi. She has no rales.   Abdominal: Abdomen is soft. She exhibits no distension. There is no abdominal tenderness. There is no rebound and no guarding.   Musculoskeletal:         General: Tenderness present. No edema. Normal range of motion.      Cervical back: Normal range of motion.      Comments: TTP over the left midclavicular line ribs margins 8-10.  Abdo soft no ecchymosis on exam.  Lungs clear to auscultation bilaterally.     Neurological: She is alert. No cranial nerve deficit.   Skin: Skin is warm. No rash noted. No erythema.   Psychiatric: She has a normal mood and affect. Her behavior is normal.       ED Course   Procedures  Labs Reviewed   COMPREHENSIVE METABOLIC  PANEL - Abnormal; Notable for the following components:       Result Value    Chloride 108 (*)     Calcium Level Total 10.6 (*)     Protein Total 7.7 (*)     All other components within normal limits   CBC WITH DIFFERENTIAL - Abnormal; Notable for the following components:    WBC 3.7 (*)     MCH 31.1 (*)     MPV 10.5 (*)     Neut # 1.6 (*)     All other components within normal limits   TROPONIN I - Normal   CBC W/ AUTO DIFFERENTIAL    Narrative:     The following orders were created for panel order CBC Auto Differential.  Procedure                               Abnormality         Status                     ---------                               -----------         ------                     CBC with Differential[[id number]]        Abnormal            Final result                 Please view results for these tests on the individual orders.     EKG Readings: (Independently Interpreted)   Initial Reading: No STEMI. Rhythm: Normal Sinus Rhythm. Heart Rate: 65. Ectopy: No Ectopy. Conduction: Normal. ST Segments: Normal ST Segments. T Waves: Normal. Axis: Normal.     Imaging Results              CT Chest Without Contrast (Final result)  Result time 11/15/22 11:56:18      Final result by Mary Morales MD (11/15/22 11:56:18)                   Impression:      No acute process.  No evidence of rib fracture seen    Old areas were endplate compression at T6 and T11.      Electronically signed by: Mary Morales  Date:    11/15/2022  Time:    11:56               Narrative:    EXAMINATION:  CT CHEST WITHOUT CONTRAST    CLINICAL HISTORY:  strong concern for rib fracture left inferior anterior;    TECHNIQUE:  Low dose axial images, sagittal and coronal reformations were obtained from the thoracic inlet to the lung bases. Contrast was not administered.  Automatic exposure control is utilized to reduce patient radiation exposure.    COMPARISON:  04/21/2022    FINDINGS:  The lungs are adequately aerated.  No  infiltrate is seen.  No mass is seen.  No lesion is seen.  No pleural effusion is seen.  No pneumothorax is seen.  No pleural thickening is seen.  The mediastinum appears grossly unremarkable.  No abnormal lymphadenopathy is seen.  Thoracic aorta appears grossly unremarkable.  Heart appears normal.    Visualized portions of the upper abdomen show no acute abnormality.    No rib fractures are seen.  No sternal fracture is seen.  There is some superior endplate compression of T6 and T11.  This was seen on the prior examination from 04/21/2022 as well.                                       X-Ray Ribs 2 View Left (Final result)  Result time 11/15/22 10:48:58      Final result by Precious Ambrosio MD (11/15/22 10:48:58)                   Impression:      No appreciable rib fracture.      Electronically signed by: Precious Ambrosio  Date:    11/15/2022  Time:    10:48               Narrative:    EXAMINATION:  XR RIBS 2 VIEW LEFT    CLINICAL HISTORY:  Pain, unspecified    TECHNIQUE:  Two views of the left ribs were performed.    COMPARISON:  11/30/2020 chest radiograph    FINDINGS:  No appreciable fracture.    No pleural effusion or pneumothorax.                                       Medications   HYDROcodone-acetaminophen  mg per tablet 1 tablet (1 tablet Oral Given 11/15/22 1034)     Medical Decision Making:   Initial Assessment:   Overall well-appearing 67-year-old female  Differential Diagnosis:   Rib contusion, costochondritis, bruised rib, rib fracture  Clinical Tests:   Lab Tests: Ordered and Reviewed  Radiological Study: Ordered and Reviewed  ED Management:  Hypertensive on arrival which I attribute this secondary to pain   Patient also has a history of hypertension which he states she takes no medicine for   Norco 10/325 given as patient does have a ride home   X-ray showed no acute osseous abnormality   EKG is unremarkable and shows no signs of ischemic changes   Troponin negative   Basic labs  unremarkable  A CT without contrast was done as I had high suspicion for rib fracture   No evidence of rib fracture was shown  Pain was much better controlled and blood pressure improved   No evidence of hypertensive emergency or urgency   Will start patient on amlodipine per request   Tramadol sent to pharmacy and fall precautions along with driving restrictions were discussed   Return precautions discussed and follow-up with PCP recommended                          Clinical Impression:   Final diagnoses:  [R52] Pain  [R52] Pain - fall  [S20.212A] Contusion of left front wall of thorax, initial encounter (Primary)        ED Disposition Condition    Discharge Stable          ED Prescriptions       Medication Sig Dispense Start Date End Date Auth. Provider    traMADoL (ULTRAM) 50 mg tablet Take 1 tablet (50 mg total) by mouth every 6 (six) hours as needed for Pain. 12 tablet 11/15/2022 -- Castro Holbrook MD    amLODIPine (NORVASC) 5 MG tablet Take 1 tablet (5 mg total) by mouth once daily. 30 tablet 11/15/2022 11/15/2023 Castro Holbrook MD          Follow-up Information       Follow up With Specialties Details Why Contact Info    [facility] - Emergency Dept Emergency Medicine  If symptoms worsen [address] [facility] [id number] [phone]    Rios Dexter MD Family Medicine Schedule an appointment as soon as possible for a visit   [address]  [facility] [address]  [phone]               Castro Holbrook MD  11/15/22 1231       Castro Holbrook MD  11/15/22 1232

## 2022-12-13 ENCOUNTER — PATIENT OUTREACH (OUTPATIENT)
Dept: ADMINISTRATIVE | Facility: HOSPITAL | Age: 67
End: 2022-12-13
Payer: MEDICARE

## 2022-12-13 NOTE — PROGRESS NOTES
Population Health Outreach.Records Received, hyper-linked into chart at this time. The following record(s)  below were uploaded for Health Maintenance .    10/1/2020-colonoscopy

## 2022-12-27 DIAGNOSIS — M54.12 CERVICAL RADICULOPATHY: ICD-10-CM

## 2022-12-27 DIAGNOSIS — I10 HYPERTENSION, UNSPECIFIED TYPE: Primary | ICD-10-CM

## 2022-12-27 RX ORDER — MELOXICAM 7.5 MG/1
TABLET ORAL
Qty: 30 TABLET | Refills: 1 | Status: SHIPPED | OUTPATIENT
Start: 2022-12-27

## 2022-12-27 RX ORDER — AMLODIPINE BESYLATE 5 MG/1
5 TABLET ORAL DAILY
Qty: 30 TABLET | Refills: 0 | Status: SHIPPED | OUTPATIENT
Start: 2022-12-27 | End: 2023-05-01

## 2023-02-20 DIAGNOSIS — Z11.4 ENCOUNTER FOR SCREENING FOR HIV: ICD-10-CM

## 2023-02-20 DIAGNOSIS — Z00.00 WELLNESS EXAMINATION: Primary | ICD-10-CM

## 2023-02-20 DIAGNOSIS — Z13.6 SCREENING FOR ISCHEMIC HEART DISEASE: ICD-10-CM

## 2023-02-20 DIAGNOSIS — Z11.59 NEED FOR HEPATITIS C SCREENING TEST: ICD-10-CM

## 2023-03-31 ENCOUNTER — OFFICE VISIT (OUTPATIENT)
Dept: FAMILY MEDICINE | Facility: CLINIC | Age: 68
End: 2023-03-31
Payer: MEDICARE

## 2023-03-31 VITALS
RESPIRATION RATE: 18 BRPM | HEIGHT: 68 IN | TEMPERATURE: 97 F | SYSTOLIC BLOOD PRESSURE: 118 MMHG | WEIGHT: 149 LBS | DIASTOLIC BLOOD PRESSURE: 78 MMHG | BODY MASS INDEX: 22.58 KG/M2 | HEART RATE: 78 BPM | OXYGEN SATURATION: 98 %

## 2023-03-31 DIAGNOSIS — F32.A DEPRESSION, UNSPECIFIED DEPRESSION TYPE: Primary | ICD-10-CM

## 2023-03-31 DIAGNOSIS — F41.9 ANXIETY: ICD-10-CM

## 2023-03-31 PROCEDURE — 3074F PR MOST RECENT SYSTOLIC BLOOD PRESSURE < 130 MM HG: ICD-10-PCS | Mod: CPTII,,, | Performed by: FAMILY MEDICINE

## 2023-03-31 PROCEDURE — 3078F PR MOST RECENT DIASTOLIC BLOOD PRESSURE < 80 MM HG: ICD-10-PCS | Mod: CPTII,,, | Performed by: FAMILY MEDICINE

## 2023-03-31 PROCEDURE — 1125F PR PAIN SEVERITY QUANTIFIED, PAIN PRESENT: ICD-10-PCS | Mod: CPTII,,, | Performed by: FAMILY MEDICINE

## 2023-03-31 PROCEDURE — 1160F RVW MEDS BY RX/DR IN RCRD: CPT | Mod: CPTII,,, | Performed by: FAMILY MEDICINE

## 2023-03-31 PROCEDURE — 3074F SYST BP LT 130 MM HG: CPT | Mod: CPTII,,, | Performed by: FAMILY MEDICINE

## 2023-03-31 PROCEDURE — 3288F FALL RISK ASSESSMENT DOCD: CPT | Mod: CPTII,,, | Performed by: FAMILY MEDICINE

## 2023-03-31 PROCEDURE — 1125F AMNT PAIN NOTED PAIN PRSNT: CPT | Mod: CPTII,,, | Performed by: FAMILY MEDICINE

## 2023-03-31 PROCEDURE — 3288F PR FALLS RISK ASSESSMENT DOCUMENTED: ICD-10-PCS | Mod: CPTII,,, | Performed by: FAMILY MEDICINE

## 2023-03-31 PROCEDURE — 1101F PR PT FALLS ASSESS DOC 0-1 FALLS W/OUT INJ PAST YR: ICD-10-PCS | Mod: CPTII,,, | Performed by: FAMILY MEDICINE

## 2023-03-31 PROCEDURE — 1101F PT FALLS ASSESS-DOCD LE1/YR: CPT | Mod: CPTII,,, | Performed by: FAMILY MEDICINE

## 2023-03-31 PROCEDURE — 3008F BODY MASS INDEX DOCD: CPT | Mod: CPTII,,, | Performed by: FAMILY MEDICINE

## 2023-03-31 PROCEDURE — 99214 OFFICE O/P EST MOD 30 MIN: CPT | Mod: ,,, | Performed by: FAMILY MEDICINE

## 2023-03-31 PROCEDURE — 3008F PR BODY MASS INDEX (BMI) DOCUMENTED: ICD-10-PCS | Mod: CPTII,,, | Performed by: FAMILY MEDICINE

## 2023-03-31 PROCEDURE — 1160F PR REVIEW ALL MEDS BY PRESCRIBER/CLIN PHARMACIST DOCUMENTED: ICD-10-PCS | Mod: CPTII,,, | Performed by: FAMILY MEDICINE

## 2023-03-31 PROCEDURE — 1159F MED LIST DOCD IN RCRD: CPT | Mod: CPTII,,, | Performed by: FAMILY MEDICINE

## 2023-03-31 PROCEDURE — 3078F DIAST BP <80 MM HG: CPT | Mod: CPTII,,, | Performed by: FAMILY MEDICINE

## 2023-03-31 PROCEDURE — 99214 PR OFFICE/OUTPT VISIT, EST, LEVL IV, 30-39 MIN: ICD-10-PCS | Mod: ,,, | Performed by: FAMILY MEDICINE

## 2023-03-31 PROCEDURE — 1159F PR MEDICATION LIST DOCUMENTED IN MEDICAL RECORD: ICD-10-PCS | Mod: CPTII,,, | Performed by: FAMILY MEDICINE

## 2023-03-31 RX ORDER — DULOXETIN HYDROCHLORIDE 60 MG/1
60 CAPSULE, DELAYED RELEASE ORAL DAILY
Qty: 30 CAPSULE | Refills: 1 | Status: SHIPPED | OUTPATIENT
Start: 2023-03-31 | End: 2023-06-07

## 2023-03-31 RX ORDER — HYDROXYZINE PAMOATE 25 MG/1
25 CAPSULE ORAL 2 TIMES DAILY PRN
Qty: 60 CAPSULE | Refills: 1 | Status: SHIPPED | OUTPATIENT
Start: 2023-03-31 | End: 2023-06-07

## 2023-03-31 NOTE — PROGRESS NOTES
"Subjective:       Patient ID: Sandra Stephens is a 67 y.o. female.    Chief Complaint: Depression; Back Pain; random acts of crying, anger; and Thoughts of feelingg hopeless and depressed, worsening       Depression    Depression  Back Pain      Review of Systems   Constitutional: Negative.    Respiratory: Negative.     Cardiovascular: Negative.    Musculoskeletal:  Positive for back pain.   Psychiatric/Behavioral:  Positive for depression.         Depression:  Patient reports increased crying, medication no longer working as well, reports increased anger/stress, occasional feelings of hopelessness, no SI/HI, worried about son who is currently incarcerated         Objective:      /78 (BP Location: Left arm, Patient Position: Sitting, BP Method: Large (Manual))   Pulse 78   Temp 97.3 °F (36.3 °C)   Resp 18   Ht 5' 8" (1.727 m)   Wt 67.6 kg (149 lb)   SpO2 98%   BMI 22.66 kg/m²    Physical Exam  Constitutional:       Appearance: Normal appearance.   Cardiovascular:      Rate and Rhythm: Normal rate and regular rhythm.      Heart sounds: Normal heart sounds.   Pulmonary:      Effort: Pulmonary effort is normal.      Breath sounds: Normal breath sounds.   Neurological:      Mental Status: She is alert.   Psychiatric:         Mood and Affect: Mood normal.         Behavior: Behavior normal.         Thought Content: Thought content normal.         Judgment: Judgment normal.      Comments: Crying during exam             Assessment:       Problem List Items Addressed This Visit          Psychiatric    Anxiety    Relevant Medications    hydrOXYzine pamoate (VISTARIL) 25 MG Cap    Other Relevant Orders    Ambulatory referral/consult to Psychiatry    Depression - Primary    Relevant Medications    DULoxetine (CYMBALTA) 60 MG capsule    Other Relevant Orders    Ambulatory referral/consult to Psychiatry          Plan:   1. Depression, unspecified depression type  -     DULoxetine (CYMBALTA) 60 MG capsule; Take 1 capsule " (60 mg total) by mouth once daily.  Dispense: 30 capsule; Refill: 1  -     Ambulatory referral/consult to Psychiatry; Future; Expected date: 04/07/2023  Increase Cymbalta to 60 mg q.day   Relaxation technique  Monitor   Return to clinic with any concerns   Refer patient to chronic mental health    2. Anxiety  -     hydrOXYzine pamoate (VISTARIL) 25 MG Cap; Take 1 capsule (25 mg total) by mouth 2 (two) times daily as needed (anxiety).  Dispense: 60 capsule; Refill: 1  -     Ambulatory referral/consult to Psychiatry; Future; Expected date: 04/07/2023  Rx for Vistaril 25 mg b.i.d. p.r.n.

## 2023-04-18 ENCOUNTER — PATIENT OUTREACH (OUTPATIENT)
Dept: ADMINISTRATIVE | Facility: HOSPITAL | Age: 68
End: 2023-04-18
Payer: MEDICARE

## 2023-04-18 NOTE — PROGRESS NOTES
Population Health. Out Reach. Reviewing patient's chart for quality metrics. I attempted to contact patient to see if she has had a recent mmg. No answer. Left Message.

## 2023-04-25 ENCOUNTER — HOSPITAL ENCOUNTER (INPATIENT)
Facility: HOSPITAL | Age: 68
LOS: 3 days | Discharge: HOME HEALTH | DRG: 065 | End: 2023-04-28
Attending: STUDENT IN AN ORGANIZED HEALTH CARE EDUCATION/TRAINING PROGRAM | Admitting: INTERNAL MEDICINE
Payer: MEDICARE

## 2023-04-25 DIAGNOSIS — F41.9 ANXIETY: ICD-10-CM

## 2023-04-25 DIAGNOSIS — Z72.0 TOBACCO USER: ICD-10-CM

## 2023-04-25 DIAGNOSIS — F14.10 COCAINE ABUSE: ICD-10-CM

## 2023-04-25 DIAGNOSIS — I63.9 CVA (CEREBRAL VASCULAR ACCIDENT): ICD-10-CM

## 2023-04-25 DIAGNOSIS — I10 HYPERTENSION, UNSPECIFIED TYPE: Primary | ICD-10-CM

## 2023-04-25 DIAGNOSIS — I63.9 STROKE: ICD-10-CM

## 2023-04-25 LAB
ALBUMIN SERPL-MCNC: 4.2 G/DL (ref 3.4–4.8)
ALBUMIN/GLOB SERPL: 1.1 RATIO (ref 1.1–2)
ALP SERPL-CCNC: 57 UNIT/L (ref 40–150)
ALT SERPL-CCNC: 35 UNIT/L (ref 0–55)
AMPHET UR QL SCN: NEGATIVE
APPEARANCE UR: CLEAR
AST SERPL-CCNC: 41 UNIT/L (ref 5–34)
BACTERIA #/AREA URNS AUTO: ABNORMAL /HPF
BARBITURATE SCN PRESENT UR: NEGATIVE
BASOPHILS # BLD AUTO: 0.03 X10(3)/MCL (ref 0–0.2)
BASOPHILS NFR BLD AUTO: 0.8 %
BENZODIAZ UR QL SCN: NEGATIVE
BILIRUB UR QL STRIP.AUTO: NEGATIVE MG/DL
BILIRUBIN DIRECT+TOT PNL SERPL-MCNC: 0.5 MG/DL
BNP BLD-MCNC: <10 PG/ML
BUN SERPL-MCNC: 13.8 MG/DL (ref 9.8–20.1)
CALCIUM SERPL-MCNC: 10.9 MG/DL (ref 8.4–10.2)
CANNABINOIDS UR QL SCN: POSITIVE
CHLORIDE SERPL-SCNC: 101 MMOL/L (ref 98–107)
CHOLEST SERPL-MCNC: 190 MG/DL
CHOLEST/HDLC SERPL: 3 {RATIO} (ref 0–5)
CO2 SERPL-SCNC: 25 MMOL/L (ref 23–31)
COCAINE UR QL SCN: POSITIVE
COLOR UR AUTO: YELLOW
CREAT SERPL-MCNC: 0.84 MG/DL (ref 0.55–1.02)
CRP SERPL-MCNC: <1 MG/L
EOSINOPHIL # BLD AUTO: 0.07 X10(3)/MCL (ref 0–0.9)
EOSINOPHIL NFR BLD AUTO: 1.8 %
ERYTHROCYTE [DISTWIDTH] IN BLOOD BY AUTOMATED COUNT: 12.3 % (ref 11.5–17)
ERYTHROCYTE [SEDIMENTATION RATE] IN BLOOD: 26 MM/HR (ref 0–20)
EST. AVERAGE GLUCOSE BLD GHB EST-MCNC: 108.3 MG/DL
FENTANYL UR QL SCN: NEGATIVE
GFR SERPLBLD CREATININE-BSD FMLA CKD-EPI: >60 MLS/MIN/1.73/M2
GLOBULIN SER-MCNC: 3.9 GM/DL (ref 2.4–3.5)
GLUCOSE SERPL-MCNC: 97 MG/DL (ref 82–115)
GLUCOSE UR QL STRIP.AUTO: NEGATIVE MG/DL
HBA1C MFR BLD: 5.4 %
HCT VFR BLD AUTO: 43.3 % (ref 37–47)
HDLC SERPL-MCNC: 67 MG/DL (ref 35–60)
HGB BLD-MCNC: 14.2 G/DL (ref 12–16)
IMM GRANULOCYTES # BLD AUTO: 0.01 X10(3)/MCL (ref 0–0.04)
IMM GRANULOCYTES NFR BLD AUTO: 0.3 %
INR BLD: 0.87 (ref 0–1.3)
KETONES UR QL STRIP.AUTO: NEGATIVE MG/DL
LDLC SERPL CALC-MCNC: 100 MG/DL (ref 50–140)
LEUKOCYTE ESTERASE UR QL STRIP.AUTO: ABNORMAL UNIT/L
LYMPHOCYTES # BLD AUTO: 1.9 X10(3)/MCL (ref 0.6–4.6)
LYMPHOCYTES NFR BLD AUTO: 49 %
MCH RBC QN AUTO: 30.5 PG (ref 27–31)
MCHC RBC AUTO-ENTMCNC: 32.8 G/DL (ref 33–36)
MCV RBC AUTO: 93.1 FL (ref 80–94)
MDMA UR QL SCN: NEGATIVE
MONOCYTES # BLD AUTO: 0.45 X10(3)/MCL (ref 0.1–1.3)
MONOCYTES NFR BLD AUTO: 11.6 %
NEUTROPHILS # BLD AUTO: 1.42 X10(3)/MCL (ref 2.1–9.2)
NEUTROPHILS NFR BLD AUTO: 36.5 %
NITRITE UR QL STRIP.AUTO: NEGATIVE
NRBC BLD AUTO-RTO: 0 %
OPIATES UR QL SCN: NEGATIVE
PCP UR QL: NEGATIVE
PH UR STRIP.AUTO: 7 [PH]
PH UR: 7 [PH] (ref 3–11)
PLATELET # BLD AUTO: 186 X10(3)/MCL (ref 130–400)
PMV BLD AUTO: 11.2 FL (ref 7.4–10.4)
POCT GLUCOSE: 112 MG/DL (ref 70–110)
POTASSIUM SERPL-SCNC: 4.6 MMOL/L (ref 3.5–5.1)
PROT SERPL-MCNC: 8.1 GM/DL (ref 5.8–7.6)
PROT UR QL STRIP.AUTO: NEGATIVE MG/DL
PROTHROMBIN TIME: 11.8 SECONDS (ref 12.5–14.5)
RBC # BLD AUTO: 4.65 X10(6)/MCL (ref 4.2–5.4)
RBC #/AREA URNS AUTO: <5 /HPF
RBC UR QL AUTO: NEGATIVE UNIT/L
SODIUM SERPL-SCNC: 139 MMOL/L (ref 136–145)
SP GR UR STRIP.AUTO: >=1.04 (ref 1–1.03)
SQUAMOUS #/AREA URNS AUTO: <5 /HPF
TRIGL SERPL-MCNC: 116 MG/DL (ref 37–140)
TROPONIN I SERPL-MCNC: <0.01 NG/ML (ref 0–0.04)
TSH SERPL-ACNC: 1.3 UIU/ML (ref 0.35–4.94)
UROBILINOGEN UR STRIP-ACNC: 4 MG/DL
VLDLC SERPL CALC-MCNC: 23 MG/DL
WBC # SPEC AUTO: 3.9 X10(3)/MCL (ref 4.5–11.5)
WBC #/AREA URNS AUTO: <5 /HPF

## 2023-04-25 PROCEDURE — 85025 COMPLETE CBC W/AUTO DIFF WBC: CPT | Performed by: STUDENT IN AN ORGANIZED HEALTH CARE EDUCATION/TRAINING PROGRAM

## 2023-04-25 PROCEDURE — 86140 C-REACTIVE PROTEIN: CPT | Performed by: PHYSICIAN ASSISTANT

## 2023-04-25 PROCEDURE — 83036 HEMOGLOBIN GLYCOSYLATED A1C: CPT | Performed by: PHYSICIAN ASSISTANT

## 2023-04-25 PROCEDURE — 84443 ASSAY THYROID STIM HORMONE: CPT | Performed by: STUDENT IN AN ORGANIZED HEALTH CARE EDUCATION/TRAINING PROGRAM

## 2023-04-25 PROCEDURE — 83880 ASSAY OF NATRIURETIC PEPTIDE: CPT | Performed by: STUDENT IN AN ORGANIZED HEALTH CARE EDUCATION/TRAINING PROGRAM

## 2023-04-25 PROCEDURE — 80053 COMPREHEN METABOLIC PANEL: CPT | Performed by: STUDENT IN AN ORGANIZED HEALTH CARE EDUCATION/TRAINING PROGRAM

## 2023-04-25 PROCEDURE — 80307 DRUG TEST PRSMV CHEM ANLYZR: CPT | Performed by: PHYSICIAN ASSISTANT

## 2023-04-25 PROCEDURE — 81001 URINALYSIS AUTO W/SCOPE: CPT | Performed by: PHYSICIAN ASSISTANT

## 2023-04-25 PROCEDURE — 80061 LIPID PANEL: CPT | Performed by: STUDENT IN AN ORGANIZED HEALTH CARE EDUCATION/TRAINING PROGRAM

## 2023-04-25 PROCEDURE — 84484 ASSAY OF TROPONIN QUANT: CPT | Performed by: STUDENT IN AN ORGANIZED HEALTH CARE EDUCATION/TRAINING PROGRAM

## 2023-04-25 PROCEDURE — 25000003 PHARM REV CODE 250: Performed by: PHYSICIAN ASSISTANT

## 2023-04-25 PROCEDURE — 11000001 HC ACUTE MED/SURG PRIVATE ROOM

## 2023-04-25 PROCEDURE — 85610 PROTHROMBIN TIME: CPT | Performed by: STUDENT IN AN ORGANIZED HEALTH CARE EDUCATION/TRAINING PROGRAM

## 2023-04-25 PROCEDURE — A4216 STERILE WATER/SALINE, 10 ML: HCPCS | Performed by: PHYSICIAN ASSISTANT

## 2023-04-25 PROCEDURE — 85651 RBC SED RATE NONAUTOMATED: CPT | Performed by: PHYSICIAN ASSISTANT

## 2023-04-25 PROCEDURE — 99285 EMERGENCY DEPT VISIT HI MDM: CPT | Mod: 25

## 2023-04-25 RX ORDER — ASPIRIN 81 MG/1
81 TABLET ORAL DAILY
Status: DISCONTINUED | OUTPATIENT
Start: 2023-04-26 | End: 2023-04-28 | Stop reason: HOSPADM

## 2023-04-25 RX ORDER — SODIUM CHLORIDE 0.9 % (FLUSH) 0.9 %
10 SYRINGE (ML) INJECTION EVERY 8 HOURS
Status: DISCONTINUED | OUTPATIENT
Start: 2023-04-25 | End: 2023-04-28 | Stop reason: HOSPADM

## 2023-04-25 RX ORDER — ATORVASTATIN CALCIUM 40 MG/1
40 TABLET, FILM COATED ORAL DAILY
Status: DISCONTINUED | OUTPATIENT
Start: 2023-04-26 | End: 2023-04-28 | Stop reason: HOSPADM

## 2023-04-25 RX ORDER — LABETALOL HYDROCHLORIDE 5 MG/ML
10 INJECTION, SOLUTION INTRAVENOUS
Status: DISCONTINUED | OUTPATIENT
Start: 2023-04-25 | End: 2023-04-26

## 2023-04-25 RX ADMIN — SODIUM CHLORIDE, PRESERVATIVE FREE 10 ML: 5 INJECTION INTRAVENOUS at 10:04

## 2023-04-25 NOTE — HPI
67 year old female with a past medical history of HTN, anxiety, low back pain, depression, and tobacco abuse presented to ED on 4/25 for left facial droop and aphasia. Last seen normal 1400. She reported she was on the phone with her son when she was suddenly unable to speak. EMS was called. Upon arrival to ED, a stroke alert was called.  /99. NIH: 1 (left facial droop). Discussed with neurology on call, no recommendations for TNK. CT head was negative for hemorrhage but did show possible hyperdense M2. CTA head and neck pending. Discussed with neurology on call. Neurology was consulted for stroke alert/stroke workup.

## 2023-04-25 NOTE — Clinical Note
Diagnosis: Stroke [040140]   Admitting Provider:: DESTINY MAC [[id number]]   Future Attending Provider: DESTINY MAC [[id number]]   Reason for IP Medical Treatment  (Clinical interventions that can only be accomplished in the IP setting? ) :: cva   I certify that Inpatient services for greater than or equal to 2 midnights are medically necessary:: Yes   Plans for Post-Acute care--if anticipated (pick the single best option):: A. No post acute care anticipated at this time

## 2023-04-25 NOTE — ED PROVIDER NOTES
Encounter Date: 4/25/2023    SCRIBE #1 NOTE: I, Gretchen Greenwood, am scribing for, and in the presence of,  Micah Veloz MD. I have scribed the following portions of the note - Other sections scribed: HPI, ROS, PE.     History     Chief Complaint   Patient presents with    Facial Droop     L sided facial droop since 1400 today. . GCS 15. Residual weakness from previous stroke     67 year old female w/ hx of stroke w/ residual R-sided weakness in arm and leg, HTN, and anxiety presents to ED via EMS for a facial droop. Pt states she was trying to speak to her son over the phone and could not get words out, nor could she open her R hand. She states the weakness in her R arm and leg is not new, but the facial droop is. Her last known normal was 1400 today.     The history is provided by the patient. No  was used.   Review of patient's allergies indicates:  No Known Allergies  Past Medical History:   Diagnosis Date    Anxiety     Hypertension     Personal history of colonic polyps 10/01/2020    COLONOSCOPY    Sciatic nerve injury     Stroke      Past Surgical History:   Procedure Laterality Date    ADENOIDECTOMY      APPENDECTOMY      CHOLECYSTECTOMY      COLONOSCOPY  10/01/2020    Castro Holm MD    TONSILLECTOMY       No family history on file.  Social History     Tobacco Use    Smoking status: Every Day     Packs/day: 0.25     Types: Cigarettes    Smokeless tobacco: Never   Substance Use Topics    Alcohol use: Yes     Review of Systems   Constitutional:  Negative for fever.   Respiratory:  Negative for shortness of breath.    Cardiovascular:  Negative for chest pain.   Gastrointestinal:  Negative for abdominal pain.   Musculoskeletal:  Negative for back pain.   Skin:  Negative for wound.   Neurological:  Positive for facial asymmetry and speech difficulty.     Physical Exam     Initial Vitals [04/25/23 1512]   BP Pulse Resp Temp SpO2   (!) 155/99 74 18 98.6 °F (37 °C) 99 %      MAP        --         Physical Exam    Nursing note and vitals reviewed.  Constitutional: She appears well-developed and well-nourished. She is not diaphoretic. No distress.   Tearful.    HENT:   Head: Normocephalic and atraumatic.   Nose: Nose normal.   Mouth/Throat: Oropharynx is clear and moist.   Eyes: EOM are normal. Pupils are equal, round, and reactive to light.   Neck: Neck supple.   Normal range of motion.  Cardiovascular:  Normal rate and regular rhythm.           No murmur heard.  Pulmonary/Chest: Breath sounds normal. No respiratory distress. She has no wheezes. She has no rales.   Abdominal: Abdomen is soft. She exhibits no distension. There is no abdominal tenderness.   Musculoskeletal:      Cervical back: Normal range of motion and neck supple.     Neurological: She is alert and oriented to person, place, and time. No sensory deficit.   L facial droop.   Chronic weakness in the R arm and R leg.    Skin: Skin is warm. Capillary refill takes less than 2 seconds. No rash noted.       ED Course   Critical Care    Date/Time: 4/25/2023 2:03 PM  Performed by: Micah Veloz MD  Authorized by: Micah Veloz MD   Direct patient critical care time: 40 minutes  Total critical care time (exclusive of procedural time) : 40 minutes  Critical care time was exclusive of separately billable procedures and treating other patients.  Critical care was necessary to treat or prevent imminent or life-threatening deterioration of the following conditions: CNS failure or compromise.  Critical care was time spent personally by me on the following activities: blood draw for specimens, development of treatment plan with patient or surrogate, discussions with consultants, evaluation of patient's response to treatment, examination of patient, obtaining history from patient or surrogate, ordering and performing treatments and interventions, ordering and review of laboratory studies, ordering and review of radiographic studies,  pulse oximetry, re-evaluation of patient's condition and review of old charts.      Labs Reviewed   COMPREHENSIVE METABOLIC PANEL - Abnormal; Notable for the following components:       Result Value    Calcium Level Total 10.9 (*)     Protein Total 8.1 (*)     Globulin 3.9 (*)     Aspartate Aminotransferase 41 (*)     All other components within normal limits   PROTIME-INR - Abnormal; Notable for the following components:    PT 11.8 (*)     All other components within normal limits   LIPID PANEL - Abnormal; Notable for the following components:    HDL Cholesterol 67 (*)     All other components within normal limits   CBC WITH DIFFERENTIAL - Abnormal; Notable for the following components:    WBC 3.9 (*)     MCHC 32.8 (*)     MPV 11.2 (*)     Neut # 1.42 (*)     All other components within normal limits   URINALYSIS, REFLEX TO URINE CULTURE - Abnormal; Notable for the following components:    Specific Gravity, UA >=1.040 (*)     Urobilinogen, UA 4.0 (*)     Leukocyte Esterase, UA Trace (*)     All other components within normal limits   DRUG SCREEN, URINE (BEAKER) - Abnormal; Notable for the following components:    Cannabinoids, Urine Positive (*)     Cocaine, Urine Positive (*)     All other components within normal limits    Narrative:     Cut off concentrations:    Amphetamines - 1000 ng/ml  Barbiturates - 200 ng/ml  Benzodiazepine - 200 ng/ml  Cannabinoids (THC) - 50 ng/ml  Cocaine - 300 ng/ml  Fentanyl - 1.0 ng/ml  MDMA - 500 ng/ml  Opiates - 300 ng/ml   Phencyclidine (PCP) - 25 ng/ml    Specimen submitted for drug analysis and tested for pH and specific gravity in order to evaluate sample integrity. Suspect tampering if specific gravity is <1.003 and/or pH is not within the range of 4.5 - 8.0  False negatives may result form substances such as bleach added to urine.  False positives may result for the presence of a substance with similar chemical structure to the drug or its metabolite.    This test provides  only a PRELIMINARY analytical test result. A more specific alternate chemical method must be used in order to obtain a confirmed analytical result. Gas chromatography/mass spectrometry (GC/MS) is the preferred confirmatory method. Other chemical confirmation methods are available. Clinical consideration and professional judgement should be applied to any drug of abuse test result, particularly when preliminary positive results are used.    Positive results will be confirmed only at the physicians request. Unconfirmed screening results are to be used only for medical purposes (treatment).        SEDIMENTATION RATE - Abnormal; Notable for the following components:    Sed Rate 26 (*)     All other components within normal limits   URINALYSIS, MICROSCOPIC - Abnormal; Notable for the following components:    Bacteria, UA Many (*)     All other components within normal limits   COMPREHENSIVE METABOLIC PANEL - Abnormal; Notable for the following components:    Calcium Level Total 10.4 (*)     All other components within normal limits   APTT - Abnormal; Notable for the following components:    PTT 36.1 (*)     All other components within normal limits   CBC WITH DIFFERENTIAL - Abnormal; Notable for the following components:    WBC 3.5 (*)     MCHC 32.9 (*)     MPV 10.6 (*)     All other components within normal limits   MANUAL DIFFERENTIAL - Abnormal; Notable for the following components:    Abs Neut 1.19 (*)     All other components within normal limits   POCT GLUCOSE - Abnormal; Notable for the following components:    POCT Glucose 112 (*)     All other components within normal limits   TSH - Normal   TROPONIN I - Normal   B-TYPE NATRIURETIC PEPTIDE - Normal   C-REACTIVE PROTEIN - Normal   MAGNESIUM - Normal   PHOSPHORUS - Normal   TROPONIN I - Normal   CK-MB - Normal   PROTIME-INR - Normal   CBC W/ AUTO DIFFERENTIAL    Narrative:     The following orders were created for panel order CBC W/ AUTO DIFFERENTIAL.  Procedure                                Abnormality         Status                     ---------                               -----------         ------                     CBC with Differential[[id number]]        Abnormal            Final result                 Please view results for these tests on the individual orders.   HEMOGLOBIN A1C   CBC W/ AUTO DIFFERENTIAL    Narrative:     The following orders were created for panel order CBC auto differential.  Procedure                               Abnormality         Status                     ---------                               -----------         ------                     CBC with Differential[[id number]]        Abnormal            Final result               Manual Differential[[id number]]          Abnormal            Final result                 Please view results for these tests on the individual orders.   ISTAT PROCEDURE   ISTAT CHEM8   POCT GLUCOSE        ECG Results              ECG 12 lead (Final result)  Result time 04/26/23 16:05:46      Final result by Interface, Lab In [facility] (04/26/23 16:05:46)                   Narrative:    Test Reason : I63.9,    Vent. Rate : 062 BPM     Atrial Rate : 062 BPM     P-R Int : 148 ms          QRS Dur : 078 ms      QT Int : 436 ms       P-R-T Axes : 060 040 051 degrees     QTc Int : 442 ms    Sinus rhythm with frequent Premature ventricular complexes  Minimal voltage criteria for LVH, may be normal variant ( Sokolow-Son )  Borderline Abnormal ECG  When compared with ECG of 15-NOV-2022 11:26,  Premature ventricular complexes are now Present  Confirmed by Faby Bob MD ([id number]) on 4/26/2023 4:05:37 PM    Referred By: [email or web address]   SELF           Confirmed By:Faby Bob MD                                     EKG 12-LEAD (Final result)  Result time 05/03/23 14:55:25      Final result by Unknown User (05/03/23 14:55:25)                                      Imaging Results              MRI Lumbar Spine Without Contrast (Final result)   Result time 04/26/23 12:01:30      Final result by Yfn Anderson MD (04/26/23 12:01:30)                   Impression:      Lumbar degenerative disc disease and spondylosis level by level discussed above.      Electronically signed by: Yfn Anderson  Date:    04/26/2023  Time:    12:01               Narrative:    EXAMINATION:  MRI LUMBAR SPINE WITHOUT CONTRAST    TECHNIQUE:  Spinal stenosis, lumbar;Lumbar radiculopathy, symptoms persist with conservative treatment;    COMPARISON:  Lumbar spine radiographs April 21, 2022.  MRI lumbar spine August 17, 2021    FINDINGS:  There is grade 1 anterolisthesis of L4 on L5.  There is some chronic loss of stature of L5 vertebral body.  Otherwise, lumbar vertebrae stature alignment preserved.  There are no significant marrow edematous signals on the STIR images.  The visualized thoracic cord is unremarkable. The conus medullaris terminates at L1.  Disc segmental analysis is given below:    At L1-L2, disc height is preserved.  Bilateral mild facet arthropathy without significant central canal stenosis.  There are no narrowings of the neural foramen    At L2-L3, disc height is preserved.  Bilateral facet arthropathy.  No significant central canal stenosis and there are no narrowings of the neural foramen.    At L3-L4, disc height is preserved.  Central canal is not stenosed and there are no narrowings of the neural foramen.    At L4-L5, there is broad disc bulge portion of which migrates posterior to L4 vertebral body and also lateralizes into the left neural foramen with interval progression.  There is also bilateral facet arthropathy and ligamentum flavum thickening.  Combination of these findings result in marked central canal stenosis.  There are bilateral narrowings of the lateral recesses which is worse on the left.  There is mild narrowing of the proximal right neural foramen.  Combination of lateralized disc and facet arthropathy results in marked narrowing of proximal  left neural foramen.    At L5-S1, there is disc bulge with central annular fissure which mildly indents the ventral thecal sac bilateral facet arthropathy.  Central canal is not stenosed.  There is mild spondylotic narrowing of the right neural foramen.  The left neural foramen is patent.  There is left facet similar small synovial cyst.                                       MRI BRAIN WITHOUT CONTRAST (Final result)  Result time 04/26/23 13:19:53      Final result by Yfn Anderson MD (04/26/23 13:19:53)                   Impression:    Impression:    1. No abnormal signal is identified on the diffusion images to suggest acute infarct.    2. Details and other findings as discussed above.    No significant discrepancy with overnight report.      Electronically signed by: Yfn Anderson  Date:    04/26/2023  Time:    13:19               Narrative:      TECHNIQUE:MULTIPLANAR, MULTISEQUENCE MAGNETIC RESONANCE IMAGING OF THE BRAIN WAS PERFORMED WITHOUT INTRAVENOUS CONTRAST.    COMPARISON:CORRELATION IS WITH CT STUDY DATED 2023-04-25 15:26:00.    CLINICAL HISTORY:NEURO DEFICIT, AMS.    Findings:    Hemorrhage:No acute intracranial hemorrhage is identified.    Stroke:No abnormal signal is identified on the diffusion images to suggest acute infarct.    Extra axial spaces:The ventricles sulci and basal cisterns are within normal limits.    Cerebral, cerebellar, and brainstem parenchyma:Moderate scattered periventricular and subcortical white matter signal abnormalities are seen. The main consideration is small vessel ischemic changes in a patient of this age. A 5 mm chronic lacunar infarct in left thalamus is seen. A slghtly bright focus in the left centrum semiovale (series 6 image 9 ) on diffusion series without ADC changes may be a subacute infarct.    Herniation:None.                        Preliminary result by Yfn Anderson MD (04/26/23 05:52:39)                   Narrative:    START OF REPORT:  TECHNIQUE:  MULTIPLANAR, MULTISEQUENCE MAGNETIC RESONANCE IMAGING OF THE BRAIN WAS PERFORMED WITHOUT INTRAVENOUS CONTRAST.    COMPARISON: CORRELATION IS WITH CT STUDY DATED â2023-04-25 15:26:00â.    CLINICAL HISTORY: NEURO DEFICIT, AMS.    Findings:  Hemorrhage: No acute intracranial hemorrhage is identified.  Stroke: No abnormal signal is identified on the diffusion images to suggest acute infarct.  Extra axial spaces: The ventricles sulci and basal cisterns are within normal limits.  Cerebral, cerebellar, and brainstem parenchyma: Moderate scattered periventricular and subcortical white matter signal abnormalities are seen. The main consideration is small vessel ischemic changes in a patient of this age. A 5 mm chronic lacunar infarct in left thalamus is seen. A slghtly bright focus in the left centrum semiovale (series 6 image 9 ) on diffusion series without ADC changes may be a subacute infarct.  Herniation: None.  Calvarium: Within normal limits.  Vascular system: Normal flow voids.  Visualized paranasal sinuses: Left maxillary sinusitis.  Visualized orbits: The visualized orbits appear unremarkable.  Visualized upper cervical spine: Normal.  Sella and skull base: Normal.  Temporal bones and mastoids: Within normal limits.      Impression:  1. No abnormal signal is identified on the diffusion images to suggest acute infarct.  2. Details and other findings as discussed above.                                         X-Ray Chest AP Portable (Final result)  Result time 04/25/23 16:10:24      Final result by Precious Ambrosio MD (04/25/23 16:10:24)                   Impression:      No acute cardiopulmonary abnormality.      Electronically signed by: Precious Ambrosio  Date:    04/25/2023  Time:    16:10               Narrative:    EXAMINATION:  XR CHEST AP PORTABLE    CLINICAL HISTORY:  Cerebral infarction, unspecified    TECHNIQUE:  Single frontal view of the chest was performed.    COMPARISON:  None    FINDINGS:  LINES  AND TUBES: EKG/telemetry leads overlie the chest.  Bra straps obscure evaluation.    MEDIASTINUM AND HANS: The cardiac silhouette is normal.    LUNGS: No lobar consolidation. No edema.    PLEURA:No pleural effusion. No pneumothorax.    BONES: No acute osseous abnormality.                                       CTA Head and Neck (xpd) (Final result)  Result time 04/25/23 15:54:48   Procedure changed from CTA STROKE MULTI-PHASE     Final result by Precious Ambrosio MD (04/25/23 15:54:48)                   Impression:      No hemodynamically significant stenosis or occlusion      Electronically signed by: Precious Ambrosio  Date:    04/25/2023  Time:    15:54               Narrative:    EXAMINATION:  CTA HEAD AND NECK (XPD)    CLINICAL HISTORY:  Neuro deficit, acute, stroke suspected;    TECHNIQUE:  CT angiogram was performed from the level of the anny to the vertex prior to and following the IV administration of intravenous contrast.  Axial, sagittal and coronal reconstructions and maximum intensity projection reconstructions were performed. Arterial stenosis percentages are based on NASCET measurement criteria.    Automated tube current modulation, weight-based exposure dosing, and/or iterative reconstruction technique utilized to reach lowest reasonably achievable exposure rate.    DLP: 1373 mGycm    COMPARISON:  None.    FINDINGS:  CTA NECK:    AORTIC ARCH AND GREAT VESSELS: Normal 3 vessel arch.    RIGHT ICA: No hemodynamically significant stenosis, aneurysmal dilatation, or dissection.    LEFT ICA: No hemodynamically significant stenosis, aneurysmal dilatation, or dissection.    VERTEBRAL ARTERIES: Patent extracranial segments. No hemodynamically significant stenosis, aneurysmal dilatation, or dissection.    CTA HEAD:    ANTERIOR CIRCULATION: No hemodynamically significant stenosis, aneurysmal dilatation, or dissection.  Mild cavernous carotid calcifications.    POSTERIOR CIRCULATION: No hemodynamically  significant stenosis, aneurysmal dilatation, or dissection.    NON-VASCULAR STRUCTURES (LIMITED EVALUATION): Normal.                                       CT HEAD FOR STROKE (Final result)  Result time 04/25/23 15:32:13   Procedure changed from CT Head Without Contrast     Final result by Precious Ambrosio MD (04/25/23 15:32:13)                   Impression:      No appreciable loss of gray-white differentiation.  No intracranial hemorrhage.    Possible hyperdense vessel sign in the right M2 distribution.    Nonspecific symmetric white matter changes most compatible with small vessel ischemic disease    Findings discussed with emergency room physician caring for patient Micah Veloz 4/25/2023 15:31.      Electronically signed by: Precious Ambrosio  Date:    04/25/2023  Time:    15:32               Narrative:    EXAMINATION:  CT HEAD FOR STROKE    CLINICAL HISTORY:  Neuro deficit, acute, stroke suspected;    TECHNIQUE:  Low dose axial CT images obtained throughout the head without intravenous contrast.  Axial, sagittal and coronal reconstructions were performed and interpreted.    DLP: 978 mGycm    All CT scans at this location are performed using dose optimization techniques as appropriate to a performed exam including the following automated exposure control, adjustment of the mA and/or kV according to patient size and/or use of iterative reconstruction technique    COMPARISON:  No relevant prior available for comparison.    FINDINGS:  BRAIN: Gray white differentiation is maintained. Periventricular and subcortical white matter changes most compatible with chronic small vessel ischemic disease.  Questionable vascular hyperdensity in the right M2 territory (2, 16).  No hemorrhage. No edema. No mass effect or midline shift.  The posterior fossa and midline structures are unremarkable.    VENTRICLES: Normal in size and configuration.    EXTRA-AXIAL: No abnormal extra-axial collections.    BONES: Calvarium is  intact.    SINUSES AND MASTOIDS: Visualized paranasal sinuses and mastoid air cells are clear.                                       Medications   LORazepam tablet 1 mg (1 mg Oral Given 4/26/23 0382)   diazePAM tablet 5 mg (5 mg Oral Given 4/26/23 4668)     Medical Decision Making      Differential diagnosis (includes but is not limited to):   CVA, ICH, TIA, electrolyte abnormalities, ACS arrhythmia, electrolyte abnormalities, kidney injury, dehydration    MDM Narrative  67-year-old female presents for evaluation of acute onset of left-sided facial droop with last known normal at approximately 2:00 p.m..  .  Code fast initiated, patient to CT.  Stroke neurology consulted, appreciate recommendations.  Labs are pending.  CTA head and neck ordered.  Chest x-ray pending.  EKG reviewed with no evidence of STEMI or atrial fibrillation.  Continue pulse oximetry and telemetry monitoring.  Telemetry monitoring independently reviewed and shows a sinus rhythm with heart rate in the 70s.  Will discuss TNK with Neurology.    Update:  CTs reviewed with no acute hemorrhagic infarct.  CT head had a questionable hyperdense vessel sign of the right in 2 segment but CTA shows no stenosis or occlusion.  Neurology does not recommend TNK due to low NIH, improving symptoms.  Recommends admission for further stroke workup.  Patient agrees with plan for admission and all questions answered at bedside.  Hospital Medicine has been consulted for admission and has accepted the patient.    Dispo:  Admit    My independent radiology interpretation:  As above  Point of care US (independently performed and interpreted):   Decision rules/clinical scoring:  As above    Amount and/or Complexity of Data Reviewed  Independent historian: EMS   Summary of history:  Report received from EMS on patient arrival including chief complaint, vital signs, medications given  External data reviewed: notes from previous admissions, notes from previous ED  visits, and notes from clinic visits  Summary of data reviewed:  Prior notes reviewed in the electronic medical record.  Patient has a history of a remote CVA with residual right-sided weakness  Risk and benefits of testing: discussed   Labs: ordered and reviewed  Radiology: ordered and independent interpretation performed (see above or ED course)  ECG/medicine tests: ordered and independent interpretation performed (see above or ED course)  Discussion of management or test interpretation with external provider(s): discussed with hospitalist physician and discussed with Neurology consultant   Summary of discussion:  As above    Risk  Parenteral controlled substances   Drug therapy requiring intense monitoring for toxicity   Decision regarding hospitalization  Shared decision making     Critical Care  30-74 minutes     Data Reviewed/Counseling: I have personally reviewed the patient's vital signs, nursing notes, and other relevant tests, information, and imaging. I had a detailed discussion regarding the historical points, exam findings, and any diagnostic results supporting the discharge diagnosis. I personally performed the history, PE, MDM and procedures as documented above and agree with the scribe's documentation.    Portions of this note were dictated using voice recognition software. Although it was reviewed for accuracy, some inherent voice recognition errors may have occurred and may be present in this document.       Additional MDM:     NIH Stroke Scale:   Interval = baseline (upon arrival/admit)  Level of consciousness = 0 - alert  LOC questions = 0 - answers both correctly  LOC commands = 0 - performs both correctly  Best gaze = 0 - normal  Visual = 0 - no visual loss  Facial palsy = 1 - minor  Motor left arm =  0 - no drift  Motor right arm =  1 - drift (chronic)  Motor left leg = 0 - no drift  Motor right leg =  1 - drift (chronic)  Limb ataxia = 0 - absent  Sensory = 0 - normal  Best language = 0 - no  aphasia  Dysarthria = 0 - normal articulation  Extinction and inattention = 0 - no neglect  NIH Stroke Scale Total = 3     Scribe Attestation:   Scribe #1: I performed the above scribed service and the documentation accurately describes the services I performed. I attest to the accuracy of the note.    Attending Attestation:           Physician Attestation for Scribe:  Physician Attestation Statement for Scribe #1: I, Micah Veloz MD, reviewed documentation, as scribed by Gretchen Greenwood in my presence, and it is both accurate and complete.           ED Course as of 05/04/23 1410   Tue Apr 25, 2023   1531 CT HEAD FOR STROKE  No hemorrhage, possible M2 hyperdense sign - will further clarify on pending CTA [MC]   1605 X-Ray Chest AP Portable  Independently visualized/reviewed by me during the ED visit.  - No consolidation or PTX [MC]   1605 ECG 12 lead  EKG independently interpreted by me.  EKG: SR @ 62, QTc 442, PVCs, no STEMI [MC]      ED Course User Index  [MC] Micah Veloz MD                 Clinical Impression:   Final diagnoses:  [I63.9] Stroke  [I63.9] CVA (cerebral vascular accident)        ED Disposition Condition    Admit Stable                Micah Veloz MD  05/04/23 1410

## 2023-04-25 NOTE — CONSULTS
[facility] Dept  Neurology  Consult Note    Patient Name: Sandra Stephens  MRN: [id number]  Admission Date: 4/25/2023  Hospital Length of Stay: 0 days  Code Status: No Order   Attending Provider: Micah Veloz MD   Consulting Provider: Lalitha Bolanos NP  Primary Care Physician: Rios Dexter MD  Principal Problem:Stroke    Inpatient consult to Vascular (Stroke) Neurology  Consult performed by: Lalitha Bolanos NP  Consult ordered by: Micah Veloz MD         Subjective:     Chief Complaint:  Left facial droop, aphasia      HPI:   67 year old female with a past medical history of HTN, anxiety, low back pain, depression, and tobacco abuse presented to ED on 4/25 for left facial droop and aphasia. Last seen normal 1400. She reported she was on the phone with her son when she was suddenly unable to speak. EMS was called. Upon arrival to ED, a stroke alert was called.  /99. NIH: 1 (left facial droop). Discussed with neurology on call, no recommendations for TNK. CT head was negative for hemorrhage but did show possible hyperdense M2. CTA head and neck pending. Discussed with neurology on call. Neurology was consulted for stroke alert/stroke workup.        Past Medical History:   Diagnosis Date    Anxiety     Hypertension     Personal history of colonic polyps 10/01/2020    COLONOSCOPY    Sciatic nerve injury     Stroke        Past Surgical History:   Procedure Laterality Date    ADENOIDECTOMY      APPENDECTOMY      CHOLECYSTECTOMY      COLONOSCOPY  10/01/2020    Castro Holm MD    TONSILLECTOMY         Review of patient's allergies indicates:  No Known Allergies    Current Neurological Medications:     No current facility-administered medications on file prior to encounter.     Current Outpatient Medications on File Prior to Encounter   Medication Sig    amLODIPine (NORVASC) 5 MG tablet Take 1 tablet (5 mg total) by mouth once daily.    DULoxetine  (CYMBALTA) 60 MG capsule Take 1 capsule (60 mg total) by mouth once daily.    gabapentin (NEURONTIN) 600 MG tablet TAKE ONE TABLET BY MOUTH THREE TIMES DAILY    hydrOXYzine pamoate (VISTARIL) 25 MG Cap Take 1 capsule (25 mg total) by mouth 2 (two) times daily as needed (anxiety).    meloxicam (MOBIC) 7.5 MG tablet TAKE 1 TABLET BY MOUTH DAILY WITH FOOD AS NEEDED FOR PAIN    nabumetone (RELAFEN) 500 MG tablet Take 1 tablet (500 mg total) by mouth 2 (two) times daily.    pantoprazole (PROTONIX) 40 MG tablet TAKE ONE TABLET BY MOUTH EVERY DAY    traMADoL (ULTRAM) 50 mg tablet Take 1 tablet (50 mg total) by mouth every 6 (six) hours as needed for Pain.     Family History    None       Tobacco Use    Smoking status: Every Day     Packs/day: 0.25     Types: Cigarettes    Smokeless tobacco: Never   Substance and Sexual Activity    Alcohol use: Yes    Drug use: Not on file    Sexual activity: Not on file     Review of Systems   Neurological:  Positive for facial asymmetry (left facial droop), speech difficulty (aphasia (improved, back to baseline)) and weakness (chronic right sided weakness). Negative for dizziness, tremors, seizures, syncope, light-headedness, numbness and headaches.   All other systems reviewed and are negative.  Objective:     Vital Signs (Most Recent):  Temp: 98.6 °F (37 °C) (04/25/23 1512)  Pulse: 74 (04/25/23 1512)  Resp: 18 (04/25/23 1512)  BP: (!) 155/99 (04/25/23 1512)  SpO2: 99 % (04/25/23 1512)   Vital Signs (24h Range):  Temp:  [98.6 °F (37 °C)] 98.6 °F (37 °C)  Pulse:  [74] 74  Resp:  [18] 18  SpO2:  [99 %] 99 %  BP: (155)/(99) 155/99     Weight: 63.9 kg (140 lb 14 oz)  Body mass index is 21.42 kg/m².    Physical Exam  Vitals and nursing note reviewed.   Constitutional:       General: She is not in acute distress.     Appearance: She is not ill-appearing, toxic-appearing or diaphoretic.   HENT:      Head: Normocephalic and atraumatic.   Eyes:      General: No visual field deficit.      Pupils: Pupils are equal, round, and reactive to light.   Pulmonary:      Effort: Pulmonary effort is normal.   Musculoskeletal:         General: Normal range of motion.      Cervical back: Normal range of motion.      Right lower leg: No edema.      Left lower leg: No edema.   Skin:     General: Skin is warm and dry.      Capillary Refill: Capillary refill takes less than 2 seconds.   Neurological:      Mental Status: She is alert.      Cranial Nerves: Facial asymmetry (left facial droop, initial assessment, very mild left facial droop, assessment while in ED, left facial droop more pronounced) present. No dysarthria.      Sensory: No sensory deficit.      Motor: Weakness (RUE, RLE weakness (4/5 chronic)) present. No tremor, atrophy, abnormal muscle tone, seizure activity or pronator drift.      Coordination: Coordination normal. Finger-Nose-Finger Test normal.   Psychiatric:         Mood and Affect: Affect is tearful.         Speech: Speech normal.         Behavior: Behavior is cooperative.       NEUROLOGICAL EXAMINATION:     MENTAL STATUS   Speech: speech is normal   Able to name object. Able to repeat.     CRANIAL NERVES     CN III, IV, VI   Pupils are equal, round, and reactive to light.    GAIT AND COORDINATION      Coordination   Finger to nose coordination: normal    Significant Labs:   Recent Lab Results         04/25/23  1512        POCT Glucose 112               Significant Imaging: I have reviewed all pertinent imaging results/findings within the past 24 hours.    Assessment and Plan:     * Stroke  Left facial droop-possible stroke   Aphasia (resolved)      -recommend stroke workup and permissive hypertension  -no recommendations to TNK at this time  -aspirin naive  -needs tobacco cessation  -awaiting CTA results, discussed CT head results with Dr. Joseph  ---If she has changes in NIH score of 3-4 points (currently NIH: 1), please reach out to Dr. Joseph and Dr. Grove for possible  interventions        Stroke alert called at 15:09  Stroke NP responded at 15:12  Discussed with Dr. Joseph 15:19  Decision not to treat with TNK 15:19  CT head completed and interpreted 15:31  Discussed with Dr. Joseph again (CT head results) 15:42        VTE Risk Mitigation (From admission, onward)    None          Thank you for your consult. Further recommendations to follow from MD Lalitha Bolanos, NP  Neurology  [facility] - Emergency Dept

## 2023-04-25 NOTE — ASSESSMENT & PLAN NOTE
Left facial droop-possible stroke   Aphasia (resolved)      -recommend stroke workup and permissive hypertension  -no recommendations to TNK at this time  -aspirin naive  -needs tobacco cessation  -awaiting CTA results, discussed CT head results with Dr. Joseph  ---If she has changes in NIH score of 3-4 points (currently NIH: 1), please reach out to Dr. Joseph and Dr. Grove for possible interventions        Stroke alert called at 15:09  Stroke NP responded at 15:12  Discussed with Dr. Joseph 15:19  Decision not to treat with TNK 15:19  CT head completed and interpreted 15:31  Discussed with Dr. Joseph again (CT head results) 15:42

## 2023-04-25 NOTE — SUBJECTIVE & OBJECTIVE
Past Medical History:   Diagnosis Date    Anxiety     Hypertension     Personal history of colonic polyps 10/01/2020    COLONOSCOPY    Sciatic nerve injury     Stroke        Past Surgical History:   Procedure Laterality Date    ADENOIDECTOMY      APPENDECTOMY      CHOLECYSTECTOMY      COLONOSCOPY  10/01/2020    Castro Holm MD    TONSILLECTOMY         Review of patient's allergies indicates:  No Known Allergies    Current Neurological Medications:     No current facility-administered medications on file prior to encounter.     Current Outpatient Medications on File Prior to Encounter   Medication Sig    amLODIPine (NORVASC) 5 MG tablet Take 1 tablet (5 mg total) by mouth once daily.    DULoxetine (CYMBALTA) 60 MG capsule Take 1 capsule (60 mg total) by mouth once daily.    gabapentin (NEURONTIN) 600 MG tablet TAKE ONE TABLET BY MOUTH THREE TIMES DAILY    hydrOXYzine pamoate (VISTARIL) 25 MG Cap Take 1 capsule (25 mg total) by mouth 2 (two) times daily as needed (anxiety).    meloxicam (MOBIC) 7.5 MG tablet TAKE 1 TABLET BY MOUTH DAILY WITH FOOD AS NEEDED FOR PAIN    nabumetone (RELAFEN) 500 MG tablet Take 1 tablet (500 mg total) by mouth 2 (two) times daily.    pantoprazole (PROTONIX) 40 MG tablet TAKE ONE TABLET BY MOUTH EVERY DAY    traMADoL (ULTRAM) 50 mg tablet Take 1 tablet (50 mg total) by mouth every 6 (six) hours as needed for Pain.     Family History    None       Tobacco Use    Smoking status: Every Day     Packs/day: 0.25     Types: Cigarettes    Smokeless tobacco: Never   Substance and Sexual Activity    Alcohol use: Yes    Drug use: Not on file    Sexual activity: Not on file     Review of Systems   Neurological:  Positive for facial asymmetry (left facial droop), speech difficulty (aphasia (improved, back to baseline)) and weakness (chronic right sided weakness). Negative for dizziness, tremors, seizures, syncope, light-headedness, numbness and headaches.   All other systems reviewed and are  negative.  Objective:     Vital Signs (Most Recent):  Temp: 98.6 °F (37 °C) (04/25/23 1512)  Pulse: 74 (04/25/23 1512)  Resp: 18 (04/25/23 1512)  BP: (!) 155/99 (04/25/23 1512)  SpO2: 99 % (04/25/23 1512)   Vital Signs (24h Range):  Temp:  [98.6 °F (37 °C)] 98.6 °F (37 °C)  Pulse:  [74] 74  Resp:  [18] 18  SpO2:  [99 %] 99 %  BP: (155)/(99) 155/99     Weight: 63.9 kg (140 lb 14 oz)  Body mass index is 21.42 kg/m².    Physical Exam  Vitals and nursing note reviewed.   Constitutional:       General: She is not in acute distress.     Appearance: She is not ill-appearing, toxic-appearing or diaphoretic.   HENT:      Head: Normocephalic and atraumatic.   Eyes:      General: No visual field deficit.     Pupils: Pupils are equal, round, and reactive to light.   Pulmonary:      Effort: Pulmonary effort is normal.   Musculoskeletal:         General: Normal range of motion.      Cervical back: Normal range of motion.      Right lower leg: No edema.      Left lower leg: No edema.   Skin:     General: Skin is warm and dry.      Capillary Refill: Capillary refill takes less than 2 seconds.   Neurological:      Mental Status: She is alert.      Cranial Nerves: Facial asymmetry (left facial droop, initial assessment, very mild left facial droop, assessment while in ED, left facial droop more pronounced) present. No dysarthria.      Sensory: No sensory deficit.      Motor: Weakness (RUE, RLE weakness (4/5 chronic)) present. No tremor, atrophy, abnormal muscle tone, seizure activity or pronator drift.      Coordination: Coordination normal. Finger-Nose-Finger Test normal.   Psychiatric:         Mood and Affect: Affect is tearful.         Speech: Speech normal.         Behavior: Behavior is cooperative.       NEUROLOGICAL EXAMINATION:     MENTAL STATUS   Speech: speech is normal   Able to name object. Able to repeat.     CRANIAL NERVES     CN III, IV, VI   Pupils are equal, round, and reactive to light.    GAIT AND COORDINATION       Coordination   Finger to nose coordination: normal    Significant Labs:   Recent Lab Results         04/25/23  1512        POCT Glucose 112               Significant Imaging: I have reviewed all pertinent imaging results/findings within the past 24 hours.

## 2023-04-26 LAB
ABS NEUT (OLG): 1.19 X10(3)/MCL (ref 2.1–9.2)
ALBUMIN SERPL-MCNC: 3.9 G/DL (ref 3.4–4.8)
ALBUMIN/GLOB SERPL: 1.1 RATIO (ref 1.1–2)
ALP SERPL-CCNC: 52 UNIT/L (ref 40–150)
ALT SERPL-CCNC: 30 UNIT/L (ref 0–55)
ANION GAP SERPL CALC-SCNC: 14 MMOL/L (ref 8–16)
APTT PPP: 36.1 SECONDS (ref 23.2–33.7)
AST SERPL-CCNC: 34 UNIT/L (ref 5–34)
AV INDEX (PROSTH): 0.57
AV MEAN GRADIENT: 3 MMHG
AV PEAK GRADIENT: 5 MMHG
AV VALVE AREA: 1.96 CM2
AV VELOCITY RATIO: 0.51
BASOPHILS NFR BLD MANUAL: 0.04 X10(3)/MCL (ref 0–0.2)
BASOPHILS NFR BLD MANUAL: 1 %
BILIRUBIN DIRECT+TOT PNL SERPL-MCNC: 0.6 MG/DL
BSA FOR ECHO PROCEDURE: 1.75 M2
BUN SERPL-MCNC: 13.1 MG/DL (ref 9.8–20.1)
BUN SERPL-MCNC: 16 MG/DL (ref 6–30)
CALCIUM SERPL-MCNC: 10.4 MG/DL (ref 8.4–10.2)
CHLORIDE SERPL-SCNC: 101 MMOL/L (ref 95–110)
CHLORIDE SERPL-SCNC: 105 MMOL/L (ref 98–107)
CK MB SERPL-MCNC: 1.3 NG/ML
CO2 SERPL-SCNC: 26 MMOL/L (ref 23–31)
CREAT SERPL-MCNC: 0.8 MG/DL (ref 0.55–1.02)
CREAT SERPL-MCNC: 0.9 MG/DL (ref 0.5–1.4)
CV ECHO LV RWT: 0.56 CM
DOP CALC AO PEAK VEL: 1.15 M/S
DOP CALC AO VTI: 24.2 CM
DOP CALC LVOT AREA: 3.5 CM2
DOP CALC LVOT DIAMETER: 2.1 CM
DOP CALC LVOT PEAK VEL: 0.59 M/S
DOP CALC LVOT STROKE VOLUME: 47.43 CM3
DOP CALC MV VTI: 27.8 CM
DOP CALCLVOT PEAK VEL VTI: 13.7 CM
E WAVE DECELERATION TIME: 227 MSEC
E/A RATIO: 0.92
E/E' RATIO: 6.88 M/S
ECHO LV POSTERIOR WALL: 1.04 CM (ref 0.6–1.1)
EJECTION FRACTION: 55 %
EOSINOPHIL NFR BLD MANUAL: 0.1 X10(3)/MCL (ref 0–0.9)
EOSINOPHIL NFR BLD MANUAL: 3 %
ERYTHROCYTE [DISTWIDTH] IN BLOOD BY AUTOMATED COUNT: 12.3 % (ref 11.5–17)
FRACTIONAL SHORTENING: 25 % (ref 28–44)
GFR SERPLBLD CREATININE-BSD FMLA CKD-EPI: >60 MLS/MIN/1.73/M2
GLOBULIN SER-MCNC: 3.4 GM/DL (ref 2.4–3.5)
GLUCOSE SERPL-MCNC: 100 MG/DL (ref 70–110)
GLUCOSE SERPL-MCNC: 104 MG/DL (ref 82–115)
HCT VFR BLD AUTO: 42 % (ref 37–47)
HCT VFR BLD CALC: 45 %PCV (ref 36–54)
HGB BLD-MCNC: 13.8 G/DL (ref 12–16)
HGB BLD-MCNC: 15 G/DL
INR BLD: 0.95 (ref 0–1.3)
INSTRUMENT WBC (OLG): 3.5 X10(3)/MCL
INTERVENTRICULAR SEPTUM: 1.01 CM (ref 0.6–1.1)
LEFT ATRIUM SIZE: 2.7 CM
LEFT ATRIUM VOLUME INDEX MOD: 11.3 ML/M2
LEFT ATRIUM VOLUME MOD: 19.8 CM3
LEFT INTERNAL DIMENSION IN SYSTOLE: 2.81 CM (ref 2.1–4)
LEFT VENTRICLE DIASTOLIC VOLUME INDEX: 33.69 ML/M2
LEFT VENTRICLE DIASTOLIC VOLUME: 59.3 ML
LEFT VENTRICLE MASS INDEX: 67 G/M2
LEFT VENTRICLE SYSTOLIC VOLUME INDEX: 16.9 ML/M2
LEFT VENTRICLE SYSTOLIC VOLUME: 29.8 ML
LEFT VENTRICULAR INTERNAL DIMENSION IN DIASTOLE: 3.73 CM (ref 3.5–6)
LEFT VENTRICULAR MASS: 118.08 G
LV LATERAL E/E' RATIO: 5 M/S
LV SEPTAL E/E' RATIO: 11 M/S
LVOT MG: 1 MMHG
LVOT MV: 0.4 CM/S
LYMPHOCYTES NFR BLD MANUAL: 1.61 X10(3)/MCL
LYMPHOCYTES NFR BLD MANUAL: 46 %
MAGNESIUM SERPL-MCNC: 2 MG/DL (ref 1.6–2.6)
MCH RBC QN AUTO: 30.9 PG (ref 27–31)
MCHC RBC AUTO-ENTMCNC: 32.9 G/DL (ref 33–36)
MCV RBC AUTO: 94 FL (ref 80–94)
MONOCYTES NFR BLD MANUAL: 0.59 X10(3)/MCL (ref 0.1–1.3)
MONOCYTES NFR BLD MANUAL: 17 %
MV MEAN GRADIENT: 1 MMHG
MV PEAK A VEL: 0.6 M/S
MV PEAK E VEL: 0.55 M/S
MV PEAK GRADIENT: 4 MMHG
MV STENOSIS PRESSURE HALF TIME: 104 MS
MV VALVE AREA BY CONTINUITY EQUATION: 1.71 CM2
MV VALVE AREA P 1/2 METHOD: 2.12 CM2
NEUTROPHILS NFR BLD MANUAL: 34 %
NRBC BLD AUTO-RTO: 0 %
OHS LV EJECTION FRACTION SIMPSONS BIPLANE MOD: 5 %
PHOSPHATE SERPL-MCNC: 3.4 MG/DL (ref 2.3–4.7)
PLATELET # BLD AUTO: 176 X10(3)/MCL (ref 130–400)
PLATELET # BLD EST: NORMAL 10*3/UL
PMV BLD AUTO: 10.6 FL (ref 7.4–10.4)
POC IONIZED CALCIUM: 1.38 MMOL/L (ref 1.06–1.42)
POC PTINR: 1.2 (ref 0.9–1.2)
POC PTWBT: 14.3 SEC (ref 9.7–14.3)
POC TCO2 (MEASURED): 29 MMOL/L (ref 23–29)
POCT GLUCOSE: 83 MG/DL (ref 70–110)
POTASSIUM BLD-SCNC: 4.5 MMOL/L (ref 3.5–5.1)
POTASSIUM SERPL-SCNC: 4.1 MMOL/L (ref 3.5–5.1)
PROT SERPL-MCNC: 7.3 GM/DL (ref 5.8–7.6)
PROTHROMBIN TIME: 12.6 SECONDS (ref 12.5–14.5)
PV PEAK VELOCITY: 0.74 CM/S
RA PRESSURE: 3 MMHG
RBC # BLD AUTO: 4.47 X10(6)/MCL (ref 4.2–5.4)
RBC MORPH BLD: NORMAL
SAMPLE: NORMAL
SAMPLE: NORMAL
SODIUM BLD-SCNC: 138 MMOL/L (ref 136–145)
SODIUM SERPL-SCNC: 138 MMOL/L (ref 136–145)
TDI LATERAL: 0.11 M/S
TDI SEPTAL: 0.05 M/S
TDI: 0.08 M/S
TRICUSPID ANNULAR PLANE SYSTOLIC EXCURSION: 1.95 CM
TROPONIN I SERPL-MCNC: <0.01 NG/ML (ref 0–0.04)
WBC # SPEC AUTO: 3.5 X10(3)/MCL (ref 4.5–11.5)

## 2023-04-26 PROCEDURE — 25000003 PHARM REV CODE 250: Performed by: HOSPITALIST

## 2023-04-26 PROCEDURE — 83735 ASSAY OF MAGNESIUM: CPT | Performed by: PHYSICIAN ASSISTANT

## 2023-04-26 PROCEDURE — 97162 PT EVAL MOD COMPLEX 30 MIN: CPT

## 2023-04-26 PROCEDURE — 25000003 PHARM REV CODE 250: Performed by: NURSE PRACTITIONER

## 2023-04-26 PROCEDURE — 25000003 PHARM REV CODE 250: Performed by: INTERNAL MEDICINE

## 2023-04-26 PROCEDURE — 82553 CREATINE MB FRACTION: CPT | Performed by: PHYSICIAN ASSISTANT

## 2023-04-26 PROCEDURE — 93005 ELECTROCARDIOGRAM TRACING: CPT

## 2023-04-26 PROCEDURE — 85025 COMPLETE CBC W/AUTO DIFF WBC: CPT | Performed by: PHYSICIAN ASSISTANT

## 2023-04-26 PROCEDURE — 97166 OT EVAL MOD COMPLEX 45 MIN: CPT

## 2023-04-26 PROCEDURE — 84484 ASSAY OF TROPONIN QUANT: CPT | Performed by: PHYSICIAN ASSISTANT

## 2023-04-26 PROCEDURE — 63600175 PHARM REV CODE 636 W HCPCS: Performed by: NURSE PRACTITIONER

## 2023-04-26 PROCEDURE — A4216 STERILE WATER/SALINE, 10 ML: HCPCS | Performed by: PHYSICIAN ASSISTANT

## 2023-04-26 PROCEDURE — 85027 COMPLETE CBC AUTOMATED: CPT | Performed by: PHYSICIAN ASSISTANT

## 2023-04-26 PROCEDURE — 25000003 PHARM REV CODE 250: Performed by: PHYSICIAN ASSISTANT

## 2023-04-26 PROCEDURE — 85730 THROMBOPLASTIN TIME PARTIAL: CPT | Performed by: PHYSICIAN ASSISTANT

## 2023-04-26 PROCEDURE — 80053 COMPREHEN METABOLIC PANEL: CPT | Performed by: PHYSICIAN ASSISTANT

## 2023-04-26 PROCEDURE — 84100 ASSAY OF PHOSPHORUS: CPT | Performed by: PHYSICIAN ASSISTANT

## 2023-04-26 PROCEDURE — 85610 PROTHROMBIN TIME: CPT | Performed by: PHYSICIAN ASSISTANT

## 2023-04-26 PROCEDURE — 11000001 HC ACUTE MED/SURG PRIVATE ROOM

## 2023-04-26 PROCEDURE — 93010 ELECTROCARDIOGRAM REPORT: CPT | Mod: ,,, | Performed by: INTERNAL MEDICINE

## 2023-04-26 PROCEDURE — 93010 EKG 12-LEAD: ICD-10-PCS | Mod: ,,, | Performed by: INTERNAL MEDICINE

## 2023-04-26 RX ORDER — TIZANIDINE 2 MG/1
2 TABLET ORAL EVERY 8 HOURS PRN
Status: DISCONTINUED | OUTPATIENT
Start: 2023-04-26 | End: 2023-04-28 | Stop reason: HOSPADM

## 2023-04-26 RX ORDER — DIAZEPAM 5 MG/1
5 TABLET ORAL ONCE AS NEEDED
Status: COMPLETED | OUTPATIENT
Start: 2023-04-26 | End: 2023-04-26

## 2023-04-26 RX ORDER — HYDRALAZINE HYDROCHLORIDE 20 MG/ML
10 INJECTION INTRAMUSCULAR; INTRAVENOUS EVERY 4 HOURS PRN
Status: DISCONTINUED | OUTPATIENT
Start: 2023-04-26 | End: 2023-04-28 | Stop reason: HOSPADM

## 2023-04-26 RX ORDER — ACETAMINOPHEN 500 MG
1000 TABLET ORAL EVERY 6 HOURS PRN
Status: DISCONTINUED | OUTPATIENT
Start: 2023-04-26 | End: 2023-04-28 | Stop reason: HOSPADM

## 2023-04-26 RX ORDER — HYDROCODONE BITARTRATE AND ACETAMINOPHEN 5; 325 MG/1; MG/1
1 TABLET ORAL EVERY 6 HOURS PRN
Status: DISCONTINUED | OUTPATIENT
Start: 2023-04-26 | End: 2023-04-28 | Stop reason: HOSPADM

## 2023-04-26 RX ORDER — LORAZEPAM 1 MG/1
1 TABLET ORAL ONCE
Status: COMPLETED | OUTPATIENT
Start: 2023-04-26 | End: 2023-04-26

## 2023-04-26 RX ORDER — SODIUM CHLORIDE 9 MG/ML
INJECTION, SOLUTION INTRAVENOUS CONTINUOUS
Status: DISCONTINUED | OUTPATIENT
Start: 2023-04-26 | End: 2023-04-28 | Stop reason: HOSPADM

## 2023-04-26 RX ADMIN — SODIUM CHLORIDE: 9 INJECTION, SOLUTION INTRAVENOUS at 01:04

## 2023-04-26 RX ADMIN — SODIUM CHLORIDE, PRESERVATIVE FREE 10 ML: 5 INJECTION INTRAVENOUS at 10:04

## 2023-04-26 RX ADMIN — ASPIRIN 81 MG: 81 TABLET, COATED ORAL at 09:04

## 2023-04-26 RX ADMIN — SODIUM CHLORIDE, PRESERVATIVE FREE 10 ML: 5 INJECTION INTRAVENOUS at 07:04

## 2023-04-26 RX ADMIN — HYDROCODONE BITARTRATE AND ACETAMINOPHEN 1 TABLET: 5; 325 TABLET ORAL at 06:04

## 2023-04-26 RX ADMIN — LORAZEPAM 1 MG: 1 TABLET ORAL at 04:04

## 2023-04-26 RX ADMIN — DIAZEPAM 5 MG: 5 TABLET ORAL at 09:04

## 2023-04-26 RX ADMIN — CEFTRIAXONE SODIUM 1 G: 1 INJECTION, POWDER, FOR SOLUTION INTRAMUSCULAR; INTRAVENOUS at 01:04

## 2023-04-26 RX ADMIN — ATORVASTATIN CALCIUM 40 MG: 40 TABLET, FILM COATED ORAL at 09:04

## 2023-04-26 NOTE — H&P
[facility] Hospital Medicine   History & Physical Note      Patient Name: Sandra Stephens  : 1955  MRN: [id number]  Patient Class: IP- Inpatient   Admission Date: 2023   Length of Stay: 0  Admitting Physician:  Service  Attending Physician: Angela James MD  PCP: Rios Dexter MD  Source of history: Patient, patient's family, and EMR.   Face-to-Face encounter date: 2023  Code status: --Full      Chief Complaint   Facial Droop (L sided facial droop since 1400 today. . GCS 15. Residual weakness from previous stroke)      History of Present Illness   Ms. Stephens is a 67-year-old female with a PMH of HTN, anxiety, CVA with residual right-sided arm and leg weakness who presented to the ED with complaints of left-sided facial droop and aphasia.  She states that she was fine this morning, and she took a nap.  Whenever she woke up around 2 p.m. she noticed she was having difficulty speaking when trying to talk to her son on the phone.  She said her speech was slurred, and she could not get the words out.  She did not even realize that she had a facial droop at that time.  She denies any other symptoms.  A code fast was called upon her arrival to the ED. Neurology was consulted, and with the NIH of 1 there were no recommendations for TNK.    Today's ED lab work was wholly unremarkable.  CT head without contrast showed no appreciable loss of gray-white differentiation.  No intracranial hemorrhage.  Possible hyperdense vessel sign in the right M2 distribution.  Nonspecific symmetric white matter changes most compatible with small vessel ischemic disease.  CTA head and neck showed no hemodynamically significant stenosis or occlusion.  CXR showed no acute cardiopulmonary abnormality.  She was admitted to Hospital Medicine for further management.    UDS positive for cocaine and cannabinoids. UA showed Many bacteria, trace leukocytes.    ROS   Except as documented, all  other systems reviewed and negative     Past Medical History     Hypertension   CVA with residual right-sided weakness  Anxiety/depression  Chronic back pain    Past Surgical History     Past Surgical History:   Procedure Laterality Date    ADENOIDECTOMY      APPENDECTOMY      CHOLECYSTECTOMY      COLONOSCOPY  10/01/2020    Castro Holm MD    TONSILLECTOMY         Social History     Social History     Tobacco Use    Smoking status: Every Day     Packs/day: 0.25     Types: Cigarettes    Smokeless tobacco: Never   Substance Use Topics    Alcohol use: Yes        Family History   Reviewed and negative    Allergies   Patient has no known allergies.    Home Medications     Prior to Admission medications    Medication Sig Start Date End Date Taking? Authorizing Provider   amLODIPine (NORVASC) 5 MG tablet Take 1 tablet (5 mg total) by mouth once daily. 12/27/22 12/27/23  Rios Dexter MD   DULoxetine (CYMBALTA) 60 MG capsule Take 1 capsule (60 mg total) by mouth once daily. 3/31/23 5/30/23  Rios Dexter MD   gabapentin (NEURONTIN) 600 MG tablet TAKE ONE TABLET BY MOUTH THREE TIMES DAILY 2/14/23   Rios Dexter MD   hydrOXYzine pamoate (VISTARIL) 25 MG Cap Take 1 capsule (25 mg total) by mouth 2 (two) times daily as needed (anxiety). 3/31/23 5/30/23  Rios Dexter MD   meloxicam (MOBIC) 7.5 MG tablet TAKE 1 TABLET BY MOUTH DAILY WITH FOOD AS NEEDED FOR PAIN 12/27/22   Rios Dexter MD   nabumetone (RELAFEN) 500 MG tablet Take 1 tablet (500 mg total) by mouth 2 (two) times daily. 10/12/22   Rios Dexter MD   pantoprazole (PROTONIX) 40 MG tablet TAKE ONE TABLET BY MOUTH EVERY DAY 2/14/23   Rios Dexter MD   traMADoL (ULTRAM) 50 mg tablet Take 1 tablet (50 mg total) by mouth every 6 (six) hours as needed for Pain. 11/15/22   Castro Holbrook MD        Inpatient Medications   Scheduled Meds   [START ON 4/26/2023] aspirin  81 mg Oral Daily    [START ON 4/26/2023] atorvastatin  40 mg Oral Daily     sodium chloride 0.9%  10 mL Intravenous Q8H     Continuous Infusions    PRN Meds  labetalol    Physical Exam   Vital Signs  Temp:  [98.6 °F (37 °C)]   Pulse:  [69-75]   Resp:  [14-22]   BP: (155-195)/()   SpO2:  [97 %-99 %]       General: Awake, alert, oriented, in no acute distress, non-toxic appearing. Appears comfortable  HEENT: NC/AT  Neck:  Supple  Chest: Clear bilaterally, no wheezing or rales, no accessory muscle use   CVS: Regular rhythm. Normal S1/S2.  Abdomen: nondistended, normoactive BS, soft and non-tender.  MSK: No obvious deformity or joint swelling  Skin: Warm and dry  Neuro: AAOx3, left facial droop.  Chronic RUE/RLE weakness present.   Psych: Tearful    Labs     Recent Labs     04/25/23  1614   WBC 3.9*   RBC 4.65   HGB 14.2   HCT 43.3   MCV 93.1   MCH 30.5   MCHC 32.8*   RDW 12.3        No results for input(s): LACTIC in the last 72 hours.  Recent Labs     04/25/23  1550   INR 0.87     Recent Labs     04/25/23  1614   HGBA1C 5.4   CHOL 190   TRIG 116   .00   VLDL 23   HDL 67*      Recent Labs     04/25/23  1614      K 4.6   CHLORIDE 101   CO2 25   BUN 13.8   CREATININE 0.84   GLUCOSE 97   CALCIUM 10.9*   ALBUMIN 4.2   GLOBULIN 3.9*   ALKPHOS 57   ALT 35   AST 41*   BILITOT 0.5   CRP <1.00   TSH 1.299     Recent Labs     04/25/23  1550 04/25/23  1614   BNP <10.0  --    TROPONINI  --  <0.010          Microbiology Results (last 7 days)       ** No results found for the last 168 hours. **           Imaging     X-Ray Chest AP Portable   Final Result      No acute cardiopulmonary abnormality.         Electronically signed by: Precious Ambrosio   Date:    04/25/2023   Time:    16:10      CTA Head and Neck (xpd)   Final Result      No hemodynamically significant stenosis or occlusion         Electronically signed by: Precious Ambrosio   Date:    04/25/2023   Time:    15:54      CT HEAD FOR STROKE   Final Result      No appreciable loss of gray-white differentiation.  No  intracranial hemorrhage.      Possible hyperdense vessel sign in the right M2 distribution.      Nonspecific symmetric white matter changes most compatible with small vessel ischemic disease      Findings discussed with emergency room physician caring for patient Micah Veloz 4/25/2023 15:31.         Electronically signed by: Precious Ambrosio   Date:    04/25/2023   Time:    15:32      MRI BRAIN WITHOUT CONTRAST    (Results Pending)     Assessment & Plan   Left sided facial droop, rule out CVA  Acute cystitis, POA  Cocaine/Cannabis use    History:  HTN, anxiety, CVA    Plan:  - Neurology consulted - appreciate recommendations  - MRI brain w/o pending  - Echo pending  - US NIVA bilateral carotids pending  - ST/PT/OT evaluation  - Ceftriaxone 1 gram q 24 hours  - Anti-hypertensives as needed  - Resume home meds as appropriate when available  - Labs in AM        VTE Prophylaxis: Kun VACA, Melisa Orozco, NP have reviewed and discussed this case with Dr. James.  Please see addendum for further assessment and plan from attending MD.

## 2023-04-26 NOTE — PT/OT/SLP EVAL
Occupational Therapy  Evaluation    Name: Sandra Stephens  MRN: [id number]  Admitting Diagnosis: Stroke  Recent Surgery: * No surgery found *      Recommendations:     Discharge Recommendations:  HH  Discharge Equipment Recommendations:  walker, rolling  Barriers to discharge:       Assessment:     Sandra Stephens is a 67 y.o. female with a medical diagnosis of Stroke w/u, acute cystitis.  She presents with the following performance deficits affecting function: weakness, impaired endurance, impaired self care skills, impaired functional mobility, gait instability, impaired balance, pain.      Rehab Prognosis: Good; patient would benefit from acute skilled OT services to address these deficits and reach maximum level of function.       Plan:     Patient to be seen 3 x/week, 5 x/week to address the above listed problems via self-care/home management, therapeutic activities, therapeutic exercises  Plan of Care Expires:    Plan of Care Reviewed with: patient, family    Subjective     Chief Complaint: R hip pain   Patient/Family Comments/goals: go home     Occupational Profile:  Living Environment: pt lives alone, has a tub shower   Previous level of function: independent with cane   Roles and Routines: unemployed/no driving   Equipment Used at Home: cane, straight  Assistance upon Discharge: family can assist as needed    Pain/Comfort:  Pain Rating 1: 8/10  Location - Side 1: Right  Location 1: hip  Pain Addressed 1: Reposition    Patients cultural, spiritual, Tenriism conflicts given the current situation:      Objective:     Communicated with: nrsg prior to session.  Patient found HOB elevated with   upon OT entry to room.    General Precautions: Standard,    Orthopedic Precautions:    Braces:    Respiratory Status: Room air  Vital Signs: Blood Pressure: 142/93 HR 75 o2 99    Occupational Performance:    Bed Mobility:    Patient completed Supine to Sit with contact guard assistance  Patient completed Sit to Supine with  contact guard assistance    Functional Mobility/Transfers:  Patient completed Sit <> Stand Transfer with contact guard assistance  with  rolling walker   Functional Mobility: no LOB; pt ambulated to ED room door and back     Activities of Daily Living:  Lower Body Dressing: minimum assistance    Toileting: minimum assistance      Cognitive/Visual Perceptual:  Cognitive/Psychosocial Skills:     -       Oriented to: Person, Place, Time, and Situation   Visual/Perceptual:      -Intact      Physical Exam:  Sensation:    -       Intact  Upper Extremity Strength:    -       Right Upper Extremity: WFL  -       Left Upper Extremity: WFL  Fine Motor Coordination:    -       Intact  Left hand, finger to nose and Right hand, finger to nose    Therapeutic Positioning  Risk for acquired pressure injuries is decreased due to ability to get to BSC/toilet with assist.      Skin assessment: all bony prominences were assessed    Findings: no redness or breakdown noted          Patient Education:  Patient provided with verbal education regarding OT role/goals/POC.  Understanding was verbalized.     Patient left HOB elevated with all lines intact and call button in reach    GOALS:   Multidisciplinary Problems       Occupational Therapy Goals          Problem: Occupational Therapy    Goal Priority Disciplines Outcome Interventions   Occupational Therapy Goal     OT, PT/OT Ongoing, Progressing    Description: Goals to be met by: 5/10/2023     Patient will increase functional independence with ADLs by performing:    LE Dressing with Modified [place].  Grooming while standing with Modified [place].  Toileting from toilet with Modified [place] for hygiene and clothing management.   Toilet transfer to toilet with Modified [place].                         History:     Past Medical History:   Diagnosis Date    Anxiety     Hypertension     Personal history of colonic polyps 10/01/2020    COLONOSCOPY    Sciatic nerve injury      Stroke          Past Surgical History:   Procedure Laterality Date    ADENOIDECTOMY      APPENDECTOMY      CHOLECYSTECTOMY      COLONOSCOPY  10/01/2020    Castro Holm MD    TONSILLECTOMY         Time Tracking:     OT Date of Treatment:    OT Start Time: 1333  OT Stop Time: 1350  OT Total Time (min): 17 min    Billable Minutes:Evaluation Moderate Complexity     4/26/2023

## 2023-04-26 NOTE — PT/OT/SLP EVAL
Physical Therapy Evaluation    Patient Name:  Sandra Stephens   MRN:  [id number]    Recommendations:     Discharge Recommendations: home with home health and family assist  Discharge Equipment Recommendations: walker, rolling   Barriers to discharge:  acuity of illness    Assessment:     Sandra Stephens is a 67 y.o. female admitted with a medical diagnosis of Stroke.  She presents with the following impairments/functional limitations: weakness, impaired endurance, impaired functional mobility, gait instability, impaired balance, decreased lower extremity function.    Rehab Prognosis: Good; patient would benefit from acute skilled PT services to address these deficits and reach maximum level of function.    Recent Surgery: * No surgery found *      Plan:     During this hospitalization, patient to be seen 6 x/week to address the identified rehab impairments via gait training, therapeutic activities, therapeutic exercises, neuromuscular re-education and progress toward the following goals:    Plan of Care Expires:  05/26/23    Subjective     Chief Complaint: R LE weakness  Patient/Family Comments/goals: to get stronger  Pain/Comfort:  Pain Rating 1: 0/10    Patients cultural, spiritual, Christian conflicts given the current situation: no    Living Environment:  Pt lives alone in a SL home, 3 steps to enter w/ no railing  Prior to admission, patients level of function was independent.  Equipment used at home: none.  DME owned (not currently used): single point cane.  Upon discharge, patient will have assistance from family.    Objective:     Communicated with RN prior to session.  Patient found HOB elevated with peripheral IV  upon PT entry to room.    General Precautions: Standard,    Orthopedic Precautions:    Braces: N/A  Respiratory Status: Room air  Blood Pressure: 152/92mmhg      Exams:  Cognitive Exam:  Patient is oriented to Person, Place, Time, and Situation  RLE Strength: Deficits: 3+/5  LLE Strength: Deficits:  4/5  Skin integrity: Visible skin intact      Functional Mobility:  Bed Mobility:     Supine to Sit: minimum assistance  Sit to Supine: stand by assistance  Transfers:     Sit to Stand:  stand by assistance with rolling walker  Gait: pt demo'd a slow step through gt pattern w/ occasional R knee buckling w/ use of RW and Emir.       AM-PAC 6 CLICK MOBILITY  Total Score:18       Treatment & Education:      Patient provided with verbal education regarding PT POC.  Understanding was verbalized.     Patient left HOB elevated with all lines intact, call button in reach, and RN notified.    GOALS:   Multidisciplinary Problems       Physical Therapy Goals          Problem: Physical Therapy    Goal Priority Disciplines Outcome Goal Variances Interventions   Physical Therapy Goal     PT, PT/OT Ongoing, Progressing     Description: Goals to be met by: 2023     Patient will increase functional independence with mobility by performin. Supine to sit with Modified [place]  2. Sit to supine with Modified [place]  3. Sit to stand transfer with Supervision  4. Gait  x 200 feet with Supervision using Rolling Walker.                          History:     Past Medical History:   Diagnosis Date    Anxiety     Hypertension     Personal history of colonic polyps 10/01/2020    COLONOSCOPY    Sciatic nerve injury     Stroke        Past Surgical History:   Procedure Laterality Date    ADENOIDECTOMY      APPENDECTOMY      CHOLECYSTECTOMY      COLONOSCOPY  10/01/2020    Castro Holm MD    TONSILLECTOMY         Time Tracking:     PT Received On: 23  PT Start Time: 911     PT Stop Time: 925  PT Total Time (min): 14 min     Billable Minutes: Evaluation , moderate complexity      2023

## 2023-04-26 NOTE — PLAN OF CARE
Problem: Physical Therapy  Goal: Physical Therapy Goal  Description: Goals to be met by: 2023     Patient will increase functional independence with mobility by performin. Supine to sit with Modified [place]  2. Sit to supine with Modified [place]  3. Sit to stand transfer with Supervision  4. Gait  x 200 feet with Supervision using Rolling Walker.     Outcome: Ongoing, Progressing

## 2023-04-26 NOTE — PT/OT/SLP PROGRESS
SLP attempting speech language cognitive evaluation, however pt lethargic and requesting SLP to come back at a different time. SLP to reattempt as schedule allows.

## 2023-04-26 NOTE — PROGRESS NOTES
[facility] Medicine Progress Note        Chief Complaint: Inpatient Follow-up for stroke    HPI:   67-year-old female with a PMH of HTN, anxiety, CVA with residual right-sided arm and leg weakness who presented to the ED with complaints of left-sided facial droop and aphasia.  She states that she was fine this morning, and she took a nap.  Whenever she woke up around 2 p.m. she noticed she was having difficulty speaking when trying to talk to her son on the phone.  She said her speech was slurred, and she could not get the words out.  She did not even realize that she had a facial droop at that time.  She denies any other symptoms.  A code fast was called upon her arrival to the ED. Neurology was consulted, and with the NIH of 1 there were no recommendations for TNK.     Today's ED lab work was wholly unremarkable.  CT head without contrast showed no appreciable loss of gray-white differentiation.  No intracranial hemorrhage.  Possible hyperdense vessel sign in the right M2 distribution.  Nonspecific symmetric white matter changes most compatible with small vessel ischemic disease.  CTA head and neck showed no hemodynamically significant stenosis or occlusion.  CXR showed no acute cardiopulmonary abnormality.  She was admitted to Hospital Medicine for further management.     UDS positive for cocaine and cannabinoids. UA showed Many bacteria, trace leukocytes.    Interval Hx:  Patient doing okay this morning.  She states that she just tired.  Nursing is at the bedside.  Denies any headache or blurred vision.  Still with some dysarthria but improved from yesterday.  Weakness is at baseline.  She just went for her MRI. She does report some mild dysuria    Objective/physical exam:  General: In no acute distress, afebrile  Chest: Clear to auscultation bilaterally  Heart: RRR, +S1, S2, no appreciable murmur  Abdomen: Soft, nontender, BS +  MSK: Warm, no lower extremity edema, no  clubbing or cyanosis  Neurologic: Alert and oriented x4, mild left facial droop.  Mild dysarthria.  Chronic right-sided weakness upper and lower      VITAL SIGNS: 24 HRS MIN & MAX LAST   Temp  Min: 98.6 °F (37 °C)  Max: 98.6 °F (37 °C) 98.6 °F (37 °C)   BP  Min: 133/97  Max: 195/109 (!) 158/104     Pulse  Min: 63  Max: 81  63   Resp  Min: 13  Max: 22 14   SpO2  Min: 95 %  Max: 100 % 99 %       Recent Labs   Lab 04/25/23  1524 04/25/23  1614 04/26/23  0350   WBC  --  3.9* 3.5*   RBC  --  4.65 4.47   HGB  --  14.2 13.8   HCT 45 43.3 42.0   MCV  --  93.1 94.0   MCH  --  30.5 30.9   MCHC  --  32.8* 32.9*   RDW  --  12.3 12.3   PLT  --  186 176   MPV  --  11.2* 10.6*       Recent Labs   Lab 04/25/23  1614 04/26/23  0350    138   K 4.6 4.1   CO2 25 26   BUN 13.8 13.1   CREATININE 0.84 0.80   CALCIUM 10.9* 10.4*   MG  --  2.00   ALBUMIN 4.2 3.9   ALKPHOS 57 52   ALT 35 30   AST 41* 34   BILITOT 0.5 0.6          Microbiology Results (last 7 days)       ** No results found for the last 168 hours. **             See below for Radiology    Scheduled Med:   aspirin  81 mg Oral Daily    atorvastatin  40 mg Oral Daily    cefTRIAXone (ROCEPHIN) IVPB  1 g Intravenous Q24H    sodium chloride 0.9%  10 mL Intravenous Q8H        Continuous Infusions:   sodium chloride 0.9% 75 mL/hr at 04/26/23 0152        PRN Meds:  acetaminophen, hydrALAZINE, HYDROcodone-acetaminophen, tiZANidine       Assessment/Plan:   Suspected acute CVA  Acute cystitis, POA  Polysubstance abuse, tobacco abuse   Essential hypertension  Hyperlipidemia  Previous CVA with residual right-sided weakness    Follow-up echo, carotids, and MRI.    Neurology is following along.  Follow up any further recommendations.  Already started on a aspirin and statin.    Permissive hypertension for now.  She is currently on Rocephin for acute cystitis.  Will follow-up urine culture.  She does have some mild leukopenia.  Will monitor for now.    I counseled on importance of  smoking and cocaine cessation.  PT OT evaluation today     12 minutes spent on counseling on tobacco cessation.  Counseled on harm and risks associated with smoking including stroke, heart disease, lung disease.  Discussed cessation resources and treatment including nicotine patch.      Patient condition:  Stable    Anticipated discharge and Disposition: TBD      All diagnosis and differential diagnosis have been reviewed; assessment and plan has been documented; I have personally reviewed the labs and test results that are presently available; I have reviewed the patients medication list; I have reviewed the consulting providers response and recommendations. I have reviewed or attempted to review medical records based upon their availability    All of the patient's questions have been  addressed and answered. Patient's is agreeable to the above stated plan. I will continue to monitor closely and make adjustments to medical management as needed.  _____________________________________________________________________      Radiology:  MRI BRAIN WITHOUT CONTRAST  START OF REPORT:  TECHNIQUE: MULTIPLANAR, MULTISEQUENCE MAGNETIC RESONANCE IMAGING OF THE BRAIN WAS PERFORMED WITHOUT INTRAVENOUS CONTRAST.    COMPARISON: CORRELATION IS WITH CT STUDY DATED â2023-04-25 15:26:00â.    CLINICAL HISTORY: NEURO DEFICIT, AMS.    Findings:  Hemorrhage: No acute intracranial hemorrhage is identified.  Stroke: No abnormal signal is identified on the diffusion images to suggest acute infarct.  Extra axial spaces: The ventricles sulci and basal cisterns are within normal limits.  Cerebral, cerebellar, and brainstem parenchyma: Moderate scattered periventricular and subcortical white matter signal abnormalities are seen. The main consideration is small vessel ischemic changes in a patient of this age. A 5 mm chronic lacunar infarct in left thalamus is seen. A slghtly bright focus in the left centrum semiovale (series 6 image 9 ) on  diffusion series without ADC changes may be a subacute infarct.  Herniation: None.  Calvarium: Within normal limits.  Vascular system: Normal flow voids.  Visualized paranasal sinuses: Left maxillary sinusitis.  Visualized orbits: The visualized orbits appear unremarkable.  Visualized upper cervical spine: Normal.  Sella and skull base: Normal.  Temporal bones and mastoids: Within normal limits.    Impression:  1. No abnormal signal is identified on the diffusion images to suggest acute infarct.  2. Details and other findings as discussed above.      Mark Mckeon MD   04/26/2023

## 2023-04-26 NOTE — PLAN OF CARE
Problem: Occupational Therapy  Goal: Occupational Therapy Goal  Description: Goals to be met by: 5/10/2023     Patient will increase functional independence with ADLs by performing:    LE Dressing with Modified [place].  Grooming while standing with Modified [place].  Toileting from toilet with Modified [place] for hygiene and clothing management.   Toilet transfer to toilet with Modified [place].    Outcome: Ongoing, Progressing

## 2023-04-27 LAB
ALBUMIN SERPL-MCNC: 3.3 G/DL (ref 3.4–4.8)
ALBUMIN/GLOB SERPL: 1.1 RATIO (ref 1.1–2)
ALP SERPL-CCNC: 49 UNIT/L (ref 40–150)
ALT SERPL-CCNC: 26 UNIT/L (ref 0–55)
AST SERPL-CCNC: 30 UNIT/L (ref 5–34)
BASOPHILS # BLD AUTO: 0.04 X10(3)/MCL (ref 0–0.2)
BASOPHILS NFR BLD AUTO: 1.4 %
BILIRUBIN DIRECT+TOT PNL SERPL-MCNC: 0.3 MG/DL
BUN SERPL-MCNC: 14.4 MG/DL (ref 9.8–20.1)
CALCIUM SERPL-MCNC: 9.3 MG/DL (ref 8.4–10.2)
CHLORIDE SERPL-SCNC: 107 MMOL/L (ref 98–107)
CO2 SERPL-SCNC: 24 MMOL/L (ref 23–31)
CREAT SERPL-MCNC: 0.78 MG/DL (ref 0.55–1.02)
EOSINOPHIL # BLD AUTO: 0.08 X10(3)/MCL (ref 0–0.9)
EOSINOPHIL NFR BLD AUTO: 2.8 %
ERYTHROCYTE [DISTWIDTH] IN BLOOD BY AUTOMATED COUNT: 12.1 % (ref 11.5–17)
GFR SERPLBLD CREATININE-BSD FMLA CKD-EPI: >60 MLS/MIN/1.73/M2
GLOBULIN SER-MCNC: 3 GM/DL (ref 2.4–3.5)
GLUCOSE SERPL-MCNC: 99 MG/DL (ref 82–115)
HCT VFR BLD AUTO: 38 % (ref 37–47)
HGB BLD-MCNC: 12.2 G/DL (ref 12–16)
IMM GRANULOCYTES # BLD AUTO: 0 X10(3)/MCL (ref 0–0.04)
IMM GRANULOCYTES NFR BLD AUTO: 0 %
LEFT CCA DIST DIAS: 22 CM/S
LEFT CCA DIST SYS: 64 CM/S
LEFT CCA PROX DIAS: 26 CM/S
LEFT CCA PROX SYS: 92 CM/S
LEFT ECA DIAS: 20 CM/S
LEFT ECA SYS: 92 CM/S
LEFT ICA DIST DIAS: 25 CM/S
LEFT ICA DIST SYS: 68 CM/S
LEFT ICA MID DIAS: 20 CM/S
LEFT ICA MID SYS: 64 CM/S
LEFT ICA PROX DIAS: 20 CM/S
LEFT ICA PROX SYS: 59 CM/S
LEFT VERTEBRAL DIAS: 8 CM/S
LEFT VERTEBRAL SYS: 51 CM/S
LYMPHOCYTES # BLD AUTO: 1.37 X10(3)/MCL (ref 0.6–4.6)
LYMPHOCYTES NFR BLD AUTO: 47.6 %
MCH RBC QN AUTO: 30.5 PG (ref 27–31)
MCHC RBC AUTO-ENTMCNC: 32.1 G/DL (ref 33–36)
MCV RBC AUTO: 95 FL (ref 80–94)
MONOCYTES # BLD AUTO: 0.36 X10(3)/MCL (ref 0.1–1.3)
MONOCYTES NFR BLD AUTO: 12.5 %
NEUTROPHILS # BLD AUTO: 1.03 X10(3)/MCL (ref 2.1–9.2)
NEUTROPHILS NFR BLD AUTO: 35.7 %
NRBC BLD AUTO-RTO: 0 %
OHS CV CAROTID RIGHT ICA EDV HIGHEST: 22
OHS CV CAROTID ULTRASOUND LEFT ICA/CCA RATIO: 1.06
OHS CV CAROTID ULTRASOUND RIGHT ICA/CCA RATIO: 1.16
OHS CV PV CAROTID LEFT HIGHEST CCA: 92
OHS CV PV CAROTID LEFT HIGHEST ICA: 68
OHS CV PV CAROTID RIGHT HIGHEST CCA: 64
OHS CV PV CAROTID RIGHT HIGHEST ICA: 73
OHS CV US CAROTID LEFT HIGHEST EDV: 25
PLATELET # BLD AUTO: 146 X10(3)/MCL (ref 130–400)
PMV BLD AUTO: 10.8 FL (ref 7.4–10.4)
POTASSIUM SERPL-SCNC: 3.8 MMOL/L (ref 3.5–5.1)
PROT SERPL-MCNC: 6.3 GM/DL (ref 5.8–7.6)
RBC # BLD AUTO: 4 X10(6)/MCL (ref 4.2–5.4)
RIGHT CCA DIST DIAS: 19 CM/S
RIGHT CCA DIST SYS: 63 CM/S
RIGHT CCA PROX DIAS: 14 CM/S
RIGHT CCA PROX SYS: 64 CM/S
RIGHT ECA DIAS: 17 CM/S
RIGHT ECA SYS: 64 CM/S
RIGHT ICA DIST DIAS: 19 CM/S
RIGHT ICA DIST SYS: 65 CM/S
RIGHT ICA MID DIAS: 22 CM/S
RIGHT ICA MID SYS: 73 CM/S
RIGHT ICA PROX DIAS: 14 CM/S
RIGHT ICA PROX SYS: 49 CM/S
RIGHT VERTEBRAL DIAS: 13 CM/S
RIGHT VERTEBRAL SYS: 62 CM/S
SODIUM SERPL-SCNC: 135 MMOL/L (ref 136–145)
WBC # SPEC AUTO: 2.9 X10(3)/MCL (ref 4.5–11.5)

## 2023-04-27 PROCEDURE — 80053 COMPREHEN METABOLIC PANEL: CPT | Performed by: PHYSICIAN ASSISTANT

## 2023-04-27 PROCEDURE — 85025 COMPLETE CBC W/AUTO DIFF WBC: CPT | Performed by: PHYSICIAN ASSISTANT

## 2023-04-27 PROCEDURE — 97530 THERAPEUTIC ACTIVITIES: CPT | Mod: CO

## 2023-04-27 PROCEDURE — 97116 GAIT TRAINING THERAPY: CPT | Mod: CQ

## 2023-04-27 PROCEDURE — 97530 THERAPEUTIC ACTIVITIES: CPT | Mod: CQ

## 2023-04-27 PROCEDURE — 92523 SPEECH SOUND LANG COMPREHEN: CPT

## 2023-04-27 PROCEDURE — 11000001 HC ACUTE MED/SURG PRIVATE ROOM

## 2023-04-27 PROCEDURE — 97535 SELF CARE MNGMENT TRAINING: CPT | Mod: CO

## 2023-04-27 PROCEDURE — 25000003 PHARM REV CODE 250: Performed by: INTERNAL MEDICINE

## 2023-04-27 PROCEDURE — 25000003 PHARM REV CODE 250: Performed by: PHYSICIAN ASSISTANT

## 2023-04-27 PROCEDURE — 63600175 PHARM REV CODE 636 W HCPCS: Performed by: NURSE PRACTITIONER

## 2023-04-27 PROCEDURE — A4216 STERILE WATER/SALINE, 10 ML: HCPCS | Performed by: PHYSICIAN ASSISTANT

## 2023-04-27 PROCEDURE — 25000003 PHARM REV CODE 250: Performed by: NURSE PRACTITIONER

## 2023-04-27 RX ADMIN — SODIUM CHLORIDE, PRESERVATIVE FREE 10 ML: 5 INJECTION INTRAVENOUS at 05:04

## 2023-04-27 RX ADMIN — HYDROCODONE BITARTRATE AND ACETAMINOPHEN 1 TABLET: 5; 325 TABLET ORAL at 01:04

## 2023-04-27 RX ADMIN — SODIUM CHLORIDE, PRESERVATIVE FREE 10 ML: 5 INJECTION INTRAVENOUS at 10:04

## 2023-04-27 RX ADMIN — HYDROCODONE BITARTRATE AND ACETAMINOPHEN 1 TABLET: 5; 325 TABLET ORAL at 09:04

## 2023-04-27 RX ADMIN — SODIUM CHLORIDE, PRESERVATIVE FREE 10 ML: 5 INJECTION INTRAVENOUS at 03:04

## 2023-04-27 RX ADMIN — ATORVASTATIN CALCIUM 40 MG: 40 TABLET, FILM COATED ORAL at 08:04

## 2023-04-27 RX ADMIN — SODIUM CHLORIDE: 9 INJECTION, SOLUTION INTRAVENOUS at 08:04

## 2023-04-27 RX ADMIN — CEFTRIAXONE SODIUM 1 G: 1 INJECTION, POWDER, FOR SOLUTION INTRAMUSCULAR; INTRAVENOUS at 01:04

## 2023-04-27 RX ADMIN — HYDRALAZINE HYDROCHLORIDE 10 MG: 20 INJECTION INTRAMUSCULAR; INTRAVENOUS at 01:04

## 2023-04-27 RX ADMIN — ASPIRIN 81 MG: 81 TABLET, COATED ORAL at 08:04

## 2023-04-27 NOTE — UM SECONDARY REVIEW
[facility] Medical Necessity Recommendation  The information in this document is a recommendation to be used for utilization review and utilization management purposes only. This recommendation is not an order. The recommendation is made  based on the information reviewed by [facility] at the time of the referral, is pursuant to the Medicare Hospital Conditions of Participation (42 CFR Part 482), and is neither a judgment  nor an assessment with regard to the appropriateness or quality of clinical care. Nothing in this document may be used to limit, alter, or affect clinical services provided to the above named patient. The  provider of services is ultimately responsible for the submission of a claim that has met all requirements for correct coding, billing, and reimbursement.  If this patients primary payor is Medicare and the patients initial physician order was for inpatient, Condition Code 44 may be appropriate if all requirements for Condition Code 44 are met. For outpatient  recommendations, observation services may have been appropriate at the time of presentation.  For readmissions within 30 days of discharge from the same hospital, we recommend the hospital refer to its internal utilization review, quality, and/or peer review processes in accordance with its internal  protocols as this recommendation does not constitute a finding regarding the quality of care provided.  Part of the Permanent Record  Page 1 of 2 Name: Guzman Porterhiram  Acct #: [id number]  Patient Name: Guzman Diehl Account Number: [id number]  Age/Gender: 62 / Male Admit Date: 04/24/2023  Attending Physician: Robin Peña Date Received: 04/25/2023  Opt Physician Advisor: Marilyn Steele MD  04/27/2023 2:05PM  [phone]    Opt Recommendation  under review:  Observation  This recommendation is based upon review of the clinical information provided to  as of  the date of this  recommendation.  Recommendation Summary  ? 04/26/2023: Inpatient  Recommendation for 04/26/2023  Supporting Clinical Factors:  ? Coronary angiography with high-risk finding(s) or intervention(s):  ? New finding of three-vessel coronary artery disease  The Attending Physician Concern:  The concerns of the attending physician are for chest pain - typical and atypical features (Crescendo Angina),  NSTEMI (non-ST-elevation myocardial infarction) - unknown type at this time, sinus tachycardia and CAD (coronary  artery disease).  Rationale:  Inpatient services are appropriate for this patient with history of hypertension, coronary disease, and dyslipidemia who  initially presented for evaluation of chest tightness/pain radiating down to left arm for 1 month in the setting of  hypokalemia (3.2) and severe calcified multivessel coronary artery disease on cardiac catheterization today, placing  this patient at an increased risk for cardiovascular compromise.    Plan of Care Includes:  The plan of care includes continued with aspirin, statin, beta-blocker, hold morning dose of Lovenox, replete  potassium, keep NPO, labs in the morning (CBC/BMP 4/26), left heart catheterization on 4/26 and Cardiothoracic  Surgery consultation.    References:  1. RiyaHandy G, Chan G, Loraine F, Chibrant GA, Versasavita F. Clinical conundrum: Three management strategies  for three-vessel coronary artery disease?. Journal of Cardiovascular Pharmacology. 2020;76(5):489-491.  doi:10.1097/FJC.6621767537612531  2. Savannah Rae. Chapter 60: Acquired heart disease: Coronary insufficiency. In: [employer] Textbook of Surgery.  21st ed. Elsevier, Inc.; 2022:8085-1738.  [facility], [facility]  [facility] Medical Necessity Recommendation  The information in this document is a recommendation to be used for utilization review and utilization management purposes only. This recommendation is not an order. The recommendation is mad

## 2023-04-27 NOTE — PROGRESS NOTES
[facility] Medicine Progress Note        Chief Complaint: Inpatient Follow-up for stroke    HPI:   67-year-old female with a PMH of HTN, anxiety, CVA with residual right-sided arm and leg weakness who presented to the ED with complaints of left-sided facial droop and aphasia.  She states that she was fine this morning, and she took a nap.  Whenever she woke up around 2 p.m. she noticed she was having difficulty speaking when trying to talk to her son on the phone.  She said her speech was slurred, and she could not get the words out.  She did not even realize that she had a facial droop at that time.  She denies any other symptoms.  A code fast was called upon her arrival to the ED. Neurology was consulted, and with the NIH of 1 there were no recommendations for TNK.  Today's ED lab work was wholly unremarkable.  CT head without contrast showed no appreciable loss of gray-white differentiation.  No intracranial hemorrhage.  Possible hyperdense vessel sign in the right M2 distribution.  Nonspecific symmetric white matter changes most compatible with small vessel ischemic disease.  CTA head and neck showed no hemodynamically significant stenosis or occlusion.  CXR showed no acute cardiopulmonary abnormality.  She was admitted to Hospital Medicine for further management.  UDS positive for cocaine and cannabinoids. UA showed Many bacteria, trace leukocytes.    Interval Hx:  Sitting in chair, teary,  Reports she has depression but her PCP is not addressing it.  States she lives alone.  Stated yesterday when she woke, she could not speak that scared her.  Now her speech is normal and she denies any complains  No family at bedside  Case discussed with patient nurse and  on the floor    Objective/physical exam:  General: In no acute distress, afebrile  Chest: Clear to auscultation bilaterally  Heart: RRR, +S1, S2, no appreciable murmur  Abdomen: Soft, nontender, BS  +  MSK: Warm, no lower extremity edema, no clubbing or cyanosis  Neurologic: Alert and oriented x4,  Chronic right-sided weakness upper and lower    Psych:  Seems depressed    VITAL SIGNS: 24 HRS MIN & MAX LAST   Temp  Min: 97.5 °F (36.4 °C)  Max: 98.4 °F (36.9 °C) 97.5 °F (36.4 °C)   BP  Min: 131/88  Max: 161/90 (!) 153/88     Pulse  Min: 63  Max: 81  79   Resp  Min: 14  Max: 18 18   SpO2  Min: 96 %  Max: 100 % 99 %       Recent Labs   Lab 04/25/23  1614 04/26/23  0350 04/27/23  0503   WBC 3.9* 3.5* 2.9*   RBC 4.65 4.47 4.00*   HGB 14.2 13.8 12.2   HCT 43.3 42.0 38.0   MCV 93.1 94.0 95.0*   MCH 30.5 30.9 30.5   MCHC 32.8* 32.9* 32.1*   RDW 12.3 12.3 12.1    176 146   MPV 11.2* 10.6* 10.8*         Recent Labs   Lab 04/25/23  1614 04/26/23  0350 04/27/23  0503    138 135*   K 4.6 4.1 3.8   CO2 25 26 24   BUN 13.8 13.1 14.4   CREATININE 0.84 0.80 0.78   CALCIUM 10.9* 10.4* 9.3   MG  --  2.00  --    ALBUMIN 4.2 3.9 3.3*   ALKPHOS 57 52 49   ALT 35 30 26   AST 41* 34 30   BILITOT 0.5 0.6 0.3            Microbiology Results (last 7 days)       ** No results found for the last 168 hours. **             See below for Radiology    Scheduled Med:   aspirin  81 mg Oral Daily    atorvastatin  40 mg Oral Daily    cefTRIAXone (ROCEPHIN) IVPB  1 g Intravenous Q24H    sodium chloride 0.9%  10 mL Intravenous Q8H        Continuous Infusions:   sodium chloride 0.9% 75 mL/hr at 04/27/23 0842        PRN Meds:  acetaminophen, hydrALAZINE, HYDROcodone-acetaminophen, tiZANidine       Assessment/Plan:   Acute CVA ruled out, MRI Brain negative   Polysubstance abuse with cocaine, THC, tobacco abuse   Essential hypertension  Hyperlipidemia  Previous CVA with residual right-sided weakness      MRI brain--> No abnormal signal is identified on the diffusion images to suggest acute infarct  Echo- no evidence of intracardiac shunting, EF 50-55%, grade 1 left ventricular diastolic dysfunction  Carotids ultrasound prelim report shows no  significant stenosis bilaterally  Neurology signed off given negative w/u.  Already on and continue on aspirin and statin.    She is currently on Rocephin for acute cystitis. No convincing evidence of UTI as UA did not reflexed Culture. Will discontinue Ceftriaxone    Pt was counseled on importance of smoking and cocaine cessation by Dr Hernandes   PT OT evaluated the patient, recommended home with home health    VTE prophylaxis: Scd    Patient condition:  Stable    Anticipated discharge and Disposition: possible tomorrow home with       All diagnosis and differential diagnosis have been reviewed; assessment and plan has been documented; I have personally reviewed the labs and test results that are presently available; I have reviewed the patients medication list; I have reviewed the consulting providers response and recommendations. I have reviewed or attempted to review medical records based upon their availability    All of the patient's questions have been  addressed and answered. Patient's is agreeable to the above stated plan. I will continue to monitor closely and make adjustments to medical management as needed.  _____________________________________________________________________      Radiology:  Echo Saline Bubble? Yes  · There is no evidence of intracardiac shunting.  · Concentric remodeling and normal systolic function.  · The estimated ejection fraction is 50-55%.  · Grade I left ventricular diastolic dysfunction.  · With normal right ventricular systolic function.  · Normal central venous pressure (3 mmHg).     MRI BRAIN WITHOUT CONTRAST  Narrative: TECHNIQUE:MULTIPLANAR, MULTISEQUENCE MAGNETIC RESONANCE IMAGING OF THE BRAIN WAS PERFORMED WITHOUT INTRAVENOUS CONTRAST.    COMPARISON:CORRELATION IS WITH CT STUDY DATED 2023-04-25 15:26:00.    CLINICAL HISTORY:NEURO DEFICIT, AMS.    Findings:    Hemorrhage:No acute intracranial hemorrhage is identified.    Stroke:No abnormal signal is identified on the  diffusion images to suggest acute infarct.    Extra axial spaces:The ventricles sulci and basal cisterns are within normal limits.    Cerebral, cerebellar, and brainstem parenchyma:Moderate scattered periventricular and subcortical white matter signal abnormalities are seen. The main consideration is small vessel ischemic changes in a patient of this age. A 5 mm chronic lacunar infarct in left thalamus is seen. A slghtly bright focus in the left centrum semiovale (series 6 image 9 ) on diffusion series without ADC changes may be a subacute infarct.    Herniation:None.  Impression: Impression:    1. No abnormal signal is identified on the diffusion images to suggest acute infarct.    2. Details and other findings as discussed above.    No significant discrepancy with overnight report.    Electronically signed by: Yfn Anderson  Date:    04/26/2023  Time:    13:19  MRI Lumbar Spine Without Contrast  Narrative: EXAMINATION:  MRI LUMBAR SPINE WITHOUT CONTRAST    TECHNIQUE:  Spinal stenosis, lumbar;Lumbar radiculopathy, symptoms persist with conservative treatment;    COMPARISON:  Lumbar spine radiographs April 21, 2022.  MRI lumbar spine August 17, 2021    FINDINGS:  There is grade 1 anterolisthesis of L4 on L5.  There is some chronic loss of stature of L5 vertebral body.  Otherwise, lumbar vertebrae stature alignment preserved.  There are no significant marrow edematous signals on the STIR images.  The visualized thoracic cord is unremarkable. The conus medullaris terminates at L1.  Disc segmental analysis is given below:    At L1-L2, disc height is preserved.  Bilateral mild facet arthropathy without significant central canal stenosis.  There are no narrowings of the neural foramen    At L2-L3, disc height is preserved.  Bilateral facet arthropathy.  No significant central canal stenosis and there are no narrowings of the neural foramen.    At L3-L4, disc height is preserved.  Central canal is not stenosed and there  are no narrowings of the neural foramen.    At L4-L5, there is broad disc bulge portion of which migrates posterior to L4 vertebral body and also lateralizes into the left neural foramen with interval progression.  There is also bilateral facet arthropathy and ligamentum flavum thickening.  Combination of these findings result in marked central canal stenosis.  There are bilateral narrowings of the lateral recesses which is worse on the left.  There is mild narrowing of the proximal right neural foramen.  Combination of lateralized disc and facet arthropathy results in marked narrowing of proximal left neural foramen.    At L5-S1, there is disc bulge with central annular fissure which mildly indents the ventral thecal sac bilateral facet arthropathy.  Central canal is not stenosed.  There is mild spondylotic narrowing of the right neural foramen.  The left neural foramen is patent.  There is left facet similar small synovial cyst.  Impression: Lumbar degenerative disc disease and spondylosis level by level discussed above.    Electronically signed by: Yfn Anderson  Date:    04/26/2023  Time:    12:01      Flaco Levi MD  Department of Hospital Medicine   [facility]   04/27/2023

## 2023-04-27 NOTE — PT/OT/SLP EVAL
"Speech Language Pathology Department  Cognitive-Communication Evaluation    Patient Name:  Sandra Stephens   MRN:  [id number]    Recommendations:     General recommendations:  SLP intervention not indicated  Communication strategies:  none  Barriers to safe discharge: none    History:     Sandra Stephens is a/n 67 y.o. female admitted for CVA workup.    Past Medical History:   Diagnosis Date    Anxiety     Hypertension     Personal history of colonic polyps 10/01/2020    COLONOSCOPY    Sciatic nerve injury     Stroke      Past Surgical History:   Procedure Laterality Date    ADENOIDECTOMY      APPENDECTOMY      CHOLECYSTECTOMY      COLONOSCOPY  10/01/2020    Castro Holm MD    TONSILLECTOMY         Previous level of Function  Education:high school  Occupation: unemployed, disabled  Lives: alone  Handed: Right  Glasses: No  Hearing Aids: No    Subjective     Patient awake, alert, and oriented x4 .  Patient goals: "to get better"   Spiritual/Cultural/Bahai Beliefs/Practices that affect care: no  Pain/Comfort: Pain Rating 1: 0/10    Objective:     SPEECH PRODUCTION  Phoneme Production: adequate  Voice Quality: adequate  Voice Production: adequate  Speech Rate: adequate  Loudness: adequate  Speech Intelligibility  Known Context: Greater that 90%  Unknown Context: Greater that 90%    AUDITORY COMPREHENSION  Identification:  Body parts: 100%  Objects: 100%  Following Directions:  1-Step: 100%  2-Step: 100%  Yes/No Questions:  Biographical: 100%  Environmental: 100%  Simple: 100%  Complex: 100%    VERBAL EXPRESSION  Automatic Speech:  Functional needs: 100%  Days of the week: 100%  Months of the year: 100%  Repetition:  Phonemes: 100%  Single syllable words: 100%  Confrontation Naming  Body Parts: 100%  Objects: 100%  Wh- Questions:  Object name: 100%  Object function: 100%    COGNITION  Orientation:  Person: 100%  Place: 100%  Time: 100%  Memory:  Immediate: 10%  Delayed: 100%  Short Term: 100%  Long Term: 100%  Problem " Solving  Functional simple: 100%  Functional complex: 100%  Organization:  Convergent thinkin%  Divergent thinkin%    Assessment:     Pt presents with functional speech and language skills, cognitive linguistic skills note to be at baseline. No skilled SLP intervention is warranted at this time.     Patient Education:     Patient provided with verbal education regarding POC.  Understanding was verbalized.    Plan:     SLP Follow-Up:      Patient to be seen:      Plan of Care expires:     Plan of Care reviewed with:  patient      Time Tracking:     SLP Treatment Date:    23  Speech Start Time:   900  Speech Stop Time:      920  Speech Total Time (min):   20    Billable minutes:  Evaluation of Speech Sound Production with Comprehension and Expression, 2023

## 2023-04-27 NOTE — PLAN OF CARE
04/27/23 1035   Discharge Reassessment   Assessment Type Discharge Planning Reassessment   Discharge Plan discussed with: Patient   Discharge Plan A Home Health   Discharge Plan B Home Health   Post-Acute Status   Post-Acute Authorization Home Health;HME   HME Status Referrals Sent  ([facility] via [employer])   Home Health Status Referrals Sent  ([facility]bal [facility])

## 2023-04-27 NOTE — PT/OT/SLP PROGRESS
Physical Therapy Treatment    Patient Name:  Sandra Stephens   MRN:  [id number]    Recommendations:     Discharge Recommendations: home with home health  Discharge Equipment Recommendations: walker, rolling  Barriers to discharge: Decreased caregiver support    Assessment:     Sandra Stephens is a 67 y.o. female admitted with a medical diagnosis of Stroke.  She presents with the following impairments/functional limitations: weakness, impaired balance, impaired endurance, impaired cardiopulmonary response to activity, impaired self care skills, impaired functional mobility, decreased lower extremity function, gait instability .    Rehab Prognosis: Good; patient would benefit from acute skilled PT services to address these deficits and reach maximum level of function.    Recent Surgery: * No surgery found *      Plan:     During this hospitalization, patient to be seen 6 x/week to address the identified rehab impairments via gait training, therapeutic activities and progress toward the following goals:    Plan of Care Expires:  05/26/23    Subjective     Chief Complaint:   Patient/Family Comments/goals:   Pain/Comfort:         Objective:     Communicated with nurse prior to session.  Patient found HOB elevated with peripheral IV, telemetry, PureWick, pulse ox (continuous) upon PT entry to room.     General Precautions: Standard,    Orthopedic Precautions:    Braces: N/A  Respiratory Status: Room air  Blood Pressure:   Skin Integrity: Visible skin intact      Functional Mobility:  Bed Mobility:     Supine to Sit: modified independence  Transfers:     Sit to Stand:  minimum assistance with rolling walker  Gait: pt amb for approx 25ft with RW and CGA with no evidence of knee buckling or unsteadiness; cues required for RW manipulation and obstacle negotiation        Education:  Patient provided with verbal education regarding safety awareness during functional mobility and DME recommendations.  Understanding was verbalized.      Patient left up in chair with all lines intact and call button in reach..    GOALS:   Multidisciplinary Problems       Physical Therapy Goals          Problem: Physical Therapy    Goal Priority Disciplines Outcome Goal Variances Interventions   Physical Therapy Goal     PT, PT/OT Ongoing, Progressing     Description: Goals to be met by: 2023     Patient will increase functional independence with mobility by performin. Supine to sit with Modified [place]  2. Sit to supine with Modified [place]  3. Sit to stand transfer with Supervision  4. Gait  x 200 feet with Supervision using Rolling Walker.                          Time Tracking:     PT Received On: 23  PT Start Time: 939     PT Stop Time: 1006  PT Total Time (min): 27 min     Billable Minutes: Gait Training 15 and Therapeutic Activity 12    Treatment Type: Treatment  PT/PTA: PTA     Number of PTA visits since last PT visit: 2023

## 2023-04-27 NOTE — PT/OT/SLP PROGRESS
Occupational Therapy   Treatment    Name: Sandra Stephens  MRN: [id number]  Admitting Diagnosis:  Stroke       Recommendations:     Discharge Recommendations: home with home health  Discharge Equipment Recommendations:  walker, rolling  Barriers to discharge:       Assessment:     Sandra Stephens is a 67 y.o. female with a medical diagnosis of Stroke.  She presents with decreased mobility. Performance deficits affecting function are weakness, gait instability, decreased lower extremity function, impaired balance, impaired endurance, decreased safety awareness, impaired self care skills, impaired functional mobility.     Rehab Prognosis:  Good; patient would benefit from acute skilled OT services to address these deficits and reach maximum level of function.       Plan:     Patient to be seen 3 x/week, 5 x/week to address the above listed problems via self-care/home management, therapeutic activities, therapeutic exercises  Plan of Care Expires:    Plan of Care Reviewed with: patient    Subjective     Pain/Comfort:  Pain Rating 1: 5/10  Location - Side 1: Bilateral  Location - Orientation 1: lower  Location 1: back    Objective:     Communicated with: nurse prior to session.  Patient found up in chair with peripheral IV, telemetry, PureWick, pulse ox (continuous) upon OT entry to room.    General Precautions: Standard, fall    Orthopedic Precautions:N/A  Braces: N/A  Respiratory Status: Room air  Vital Signs: Blood Pressure: 156/78     Occupational Performance:     Functional Mobility/Transfers:  Sit to stand with Min A. Patient request to use restroom. Ambulate to bathroom with Min A with RW, transfer to toilet with CGA. Standing at sink to wash hands with CGA with RW    Activities of Daily Living:  Toileting with CGA    [facility] 6 Click ADL: 20    Patient Education:  Patient provided with verbal and demonstration education regarding fall prevention and safety awareness.  Understanding was verbalized, however additional teaching  warranted.      Patient left HOB elevated with all lines intact, call button in reach, and CNA present    GOALS:   Multidisciplinary Problems       Occupational Therapy Goals          Problem: Occupational Therapy    Goal Priority Disciplines Outcome Interventions   Occupational Therapy Goal     OT, PT/OT Ongoing, Progressing    Description: Goals to be met by: 5/10/2023     Patient will increase functional independence with ADLs by performing:    LE Dressing with Modified [place].  Grooming while standing with Modified [place].  Toileting from toilet with Modified [place] for hygiene and clothing management.   Toilet transfer to toilet with Modified [place].                         Time Tracking:     OT Date of Treatment: 04/27/23  OT Start Time: 1200  OT Stop Time: 1227  OT Total Time (min): 27 min    Billable Minutes:Self Care/Home Management 14  Therapeutic Activity 13    OT/ORACIO: ORACIO     Number of ORACIO visits since last OT visit: 1 4/27/2023

## 2023-04-27 NOTE — NURSING
Nurses Note -- 4 Eyes      4/26/2023   7:48 PM      Skin assessed during: Admit      [x] No Altered Skin Integrity Present    []Prevention Measures Documented      [] Yes- Altered Skin Integrity Present or Discovered   [] LDA Added if Not in Epic (Describe Wound)   [] New Altered Skin Integrity was Present on Admit and Documented in LDA   [] Wound Image Taken    Wound Care Consulted? No    Attending Nurse:  Joe Li LPN     Second RN/Staff Member:  Sole PADILLA LPN

## 2023-04-28 VITALS
SYSTOLIC BLOOD PRESSURE: 186 MMHG | DIASTOLIC BLOOD PRESSURE: 98 MMHG | WEIGHT: 140.88 LBS | TEMPERATURE: 98 F | RESPIRATION RATE: 18 BRPM | OXYGEN SATURATION: 99 % | HEIGHT: 68 IN | HEART RATE: 71 BPM | BODY MASS INDEX: 21.35 KG/M2

## 2023-04-28 PROBLEM — I63.9 STROKE: Status: RESOLVED | Noted: 2023-04-25 | Resolved: 2023-04-28

## 2023-04-28 PROBLEM — F14.10 COCAINE ABUSE: Status: ACTIVE | Noted: 2023-04-28

## 2023-04-28 PROCEDURE — A4216 STERILE WATER/SALINE, 10 ML: HCPCS | Performed by: PHYSICIAN ASSISTANT

## 2023-04-28 PROCEDURE — 25000003 PHARM REV CODE 250: Performed by: PHYSICIAN ASSISTANT

## 2023-04-28 PROCEDURE — 25000003 PHARM REV CODE 250: Performed by: INTERNAL MEDICINE

## 2023-04-28 PROCEDURE — 97530 THERAPEUTIC ACTIVITIES: CPT | Mod: CO

## 2023-04-28 PROCEDURE — 94761 N-INVAS EAR/PLS OXIMETRY MLT: CPT

## 2023-04-28 PROCEDURE — 63600175 PHARM REV CODE 636 W HCPCS: Performed by: NURSE PRACTITIONER

## 2023-04-28 RX ORDER — ASPIRIN 81 MG/1
81 TABLET ORAL DAILY
Refills: 0 | COMMUNITY
Start: 2023-04-29 | End: 2024-04-28

## 2023-04-28 RX ORDER — ATORVASTATIN CALCIUM 40 MG/1
40 TABLET, FILM COATED ORAL DAILY
Qty: 30 TABLET | Refills: 1 | Status: SHIPPED | OUTPATIENT
Start: 2023-04-29 | End: 2023-06-08 | Stop reason: SDUPTHER

## 2023-04-28 RX ADMIN — HYDROCODONE BITARTRATE AND ACETAMINOPHEN 1 TABLET: 5; 325 TABLET ORAL at 05:04

## 2023-04-28 RX ADMIN — HYDRALAZINE HYDROCHLORIDE 10 MG: 20 INJECTION INTRAMUSCULAR; INTRAVENOUS at 08:04

## 2023-04-28 RX ADMIN — ATORVASTATIN CALCIUM 40 MG: 40 TABLET, FILM COATED ORAL at 08:04

## 2023-04-28 RX ADMIN — SODIUM CHLORIDE, PRESERVATIVE FREE 10 ML: 5 INJECTION INTRAVENOUS at 05:04

## 2023-04-28 RX ADMIN — ASPIRIN 81 MG: 81 TABLET, COATED ORAL at 08:04

## 2023-04-28 NOTE — DISCHARGE SUMMARY
[facility] Hospital Medicine Discharge Summary    Admit Date: 4/25/2023  Discharge Date and Time: 4/28/20239:18 AM  Admitting Physician:  Team  Discharging Physician: Flaco Levi MD.  Primary Care Physician: Rios Dexter MD  Consults: Neurology    Discharge Diagnoses:  Acute CVA ruled out, MRI Brain negative   Polysubstance abuse with cocaine, THC, tobacco abuse   Essential hypertension  Hyperlipidemia  Previous CVA with residual right-sided weakness    Hospital Course:   67-year-old female with a PMH of HTN, anxiety, CVA with residual right-sided arm and leg weakness who presented to the ED with complaints of left-sided facial droop and aphasia.  She states that she was fine this morning, and she took a nap.  Whenever she woke up around 2 p.m. she noticed she was having difficulty speaking when trying to talk to her son on the phone.  She said her speech was slurred, and she could not get the words out.  She did not even realize that she had a facial droop at that time.  She denies any other symptoms.  A code fast was called upon her arrival to the ED. Neurology was consulted, and with the NIH of 1 there were no recommendations for TNK.  Today's ED lab work were unremarkable.  CT head without contrast showed no appreciable loss of gray-white differentiation.  No intracranial hemorrhage.  Possible hyperdense vessel sign in the right M2 distribution.  Nonspecific symmetric white matter changes most compatible with small vessel ischemic disease.  CTA head and neck showed no hemodynamically significant stenosis or occlusion.  CXR showed no acute cardiopulmonary abnormality.  She was admitted to Hospital Medicine for further management.  UDS positive for cocaine and cannabinoids. UA showed Many bacteria, trace leukocytes.  MRI brain--> No abnormal signal is identified on the diffusion images to suggest acute infarct  Echo- no evidence of intracardiac shunting, EF 50-55%, grade  1 left ventricular diastolic dysfunction  Carotids ultrasound prelim report shows no significant stenosis bilaterally  Neurology signed off given negative w/u.  Already on and continue on aspirin and statin.    She is currently on Rocephin for acute cystitis. No convincing evidence of UTI as UA did not reflexed Culture. Will discontinue Ceftriaxone    Pt was counseled on importance of smoking and cocaine cessation by Dr Hernandes   PT OT evaluated the patient, recommended home with home health, pt is ready for discharge     Pt was seen and examined on the day of discharge  Vitals:  VITAL SIGNS: 24 HRS MIN & MAX LAST   Temp  Min: 97.6 °F (36.4 °C)  Max: 97.9 °F (36.6 °C) 97.9 °F (36.6 °C)   BP  Min: 132/76  Max: 186/98 (!) 186/98     Pulse  Min: 70  Max: 93  71   Resp  Min: 18  Max: 18 18   SpO2  Min: 98 %  Max: 100 % 99 %       Physical Exam:  General: In no acute distress, afebrile  Chest: Clear to auscultation bilaterally  Heart: RRR, +S1, S2, no appreciable murmur  Abdomen: Soft, nontender, BS +  MSK: Warm, no lower extremity edema, no clubbing or cyanosis  Neurologic: Alert and oriented x4,  Chronic right-sided weakness upper and lower    Psych:  Seems depressed    Procedures Performed: No admission procedures for hospital encounter.     Significant Diagnostic Studies: See Full reports for all details    Recent Labs   Lab 04/25/23  1614 04/26/23  0350 04/27/23  0503   WBC 3.9* 3.5* 2.9*   RBC 4.65 4.47 4.00*   HGB 14.2 13.8 12.2   HCT 43.3 42.0 38.0   MCV 93.1 94.0 95.0*   MCH 30.5 30.9 30.5   MCHC 32.8* 32.9* 32.1*   RDW 12.3 12.3 12.1    176 146   MPV 11.2* 10.6* 10.8*       Recent Labs   Lab 04/25/23  1614 04/26/23  0350 04/27/23  0503    138 135*   K 4.6 4.1 3.8   CO2 25 26 24   BUN 13.8 13.1 14.4   CREATININE 0.84 0.80 0.78   CALCIUM 10.9* 10.4* 9.3   MG  --  2.00  --    ALBUMIN 4.2 3.9 3.3*   ALKPHOS 57 52 49   ALT 35 30 26   AST 41* 34 30   BILITOT 0.5 0.6 0.3        Microbiology Results (last 7  days)       ** No results found for the last 168 hours. **             CV Ultrasound Bilateral Doppler Carotid  The right internal carotid artery is patent with less than 50% stenosis.  The left internal carotid artery is patent with less than 50% stenosis.   The bilateral vertebral arteries are patent with antegrade flow.         Medication List        START taking these medications      aspirin 81 MG EC tablet  Commonly known as: ECOTRIN  Take 1 tablet (81 mg total) by mouth once daily.  Start taking on: April 29, 2023     atorvastatin 40 MG tablet  Commonly known as: LIPITOR  Take 1 tablet (40 mg total) by mouth once daily.  Start taking on: April 29, 2023            CONTINUE taking these medications      amLODIPine 5 MG tablet  Commonly known as: NORVASC  Take 1 tablet (5 mg total) by mouth once daily.     DULoxetine 60 MG capsule  Commonly known as: CYMBALTA  Take 1 capsule (60 mg total) by mouth once daily.     gabapentin 600 MG tablet  Commonly known as: NEURONTIN  TAKE ONE TABLET BY MOUTH THREE TIMES DAILY     hydrOXYzine pamoate 25 MG Cap  Commonly known as: VistariL  Take 1 capsule (25 mg total) by mouth 2 (two) times daily as needed (anxiety).     meloxicam 7.5 MG tablet  Commonly known as: MOBIC  TAKE 1 TABLET BY MOUTH DAILY WITH FOOD AS NEEDED FOR PAIN     nabumetone 500 MG tablet  Commonly known as: RELAFEN  Take 1 tablet (500 mg total) by mouth 2 (two) times daily.     pantoprazole 40 MG tablet  Commonly known as: PROTONIX  TAKE ONE TABLET BY MOUTH EVERY DAY     traMADoL 50 mg tablet  Commonly known as: ULTRAM  Take 1 tablet (50 mg total) by mouth every 6 (six) hours as needed for Pain.               Where to Get Your Medications        These medications were sent to [facility] [place] - [address]  [address], [place] [address]      Phone: [phone]   atorvastatin 40 MG tablet       You can get these medications from any pharmacy    You don't need a prescription for  these medications  aspirin 81 MG EC tablet          Explained in detail to the patient about the discharge plan, medications, and follow-up visits. Pt understands and agrees with the treatment plan  Discharge Disposition: Home with HH  Discharged Condition: stable  Diet-   Dietary Orders (From admission, onward)       Start     Ordered    04/26/23 1240  Diet heart healthy  Diet effective now         04/26/23 1239                   Medications Per DC med rec  Activities as tolerated   Contact information for follow-up providers       Rios Dexter MD. Schedule an appointment as soon as possible for a visit.    Specialty: Family Medicine  Contact information:  [address]  [place] [address]  [phone]                       Contact information for after-discharge care       [facility]       [facility]    Service: [facility] Services  Contact information:  [address] [place] [id number] [phone]                                 For further questions contact hospitalist office    Discharge time 34 minutes    For worsening symptoms, chest pain, shortness of breath, increased abdominal pain, high grade fever, stroke or stroke like symptoms, immediately go to the nearest Emergency Room or call 911 as soon as possible.      Flaco Levi MD  Department of Hospital Medicine   [facility]   04/28/2023 9:18 AM

## 2023-04-28 NOTE — PLAN OF CARE
04/28/23 0908   Final Note   Assessment Type Final Discharge Note   Anticipated Discharge Disposition Home-[facility]   Hospital Resources/Appts/Education Provided Post-Acute resouces added to AVS   Post-Acute Status   Post-Acute Authorization HME;Home Health   HME Status Set-up Complete/Auth obtained  (walker via [employer]tech)   Home Health Status Set-up Complete/Auth obtained  ([facility])

## 2023-04-28 NOTE — PLAN OF CARE
Problem: Adult Inpatient Plan of Care  Goal: Plan of Care Review  4/27/2023 2324 by Franck Barajas RN  Outcome: Ongoing, Progressing  4/27/2023 2324 by Franck Barajas RN  Outcome: Ongoing, Progressing  Goal: Patient-Specific Goal (Individualized)  4/27/2023 2324 by Franck Barajas RN  Outcome: Ongoing, Progressing  4/27/2023 2324 by Franck Barajas, FRANCISCO  Outcome: Ongoing, Progressing  Goal: Absence of Hospital-Acquired Illness or Injury  4/27/2023 2324 by Franck Barajas, FRANCISCO  Outcome: Ongoing, Progressing  4/27/2023 2324 by Franck Barajas RN  Outcome: Ongoing, Progressing  Goal: Optimal Comfort and Wellbeing  4/27/2023 2324 by Franck Barajas RN  Outcome: Ongoing, Progressing  4/27/2023 2324 by Franck Barajas RN  Outcome: Ongoing, Progressing  Goal: Readiness for Transition of Care  4/27/2023 2324 by Franck Barajas RN  Outcome: Ongoing, Progressing  4/27/2023 2324 by Franck Barajas RN  Outcome: Ongoing, Progressing     Problem: Adjustment to Illness (Stroke, Ischemic/Transient Ischemic Attack)  Goal: Optimal Coping  4/27/2023 2324 by Franck Barajas, FRANCISCO  Outcome: Ongoing, Progressing  4/27/2023 2324 by Franck Barajas RN  Outcome: Ongoing, Progressing     Problem: Bowel Elimination Impaired (Stroke, Ischemic/Transient Ischemic Attack)  Goal: Effective Bowel Elimination  4/27/2023 2324 by Franck Barajas, FRANCISCO  Outcome: Ongoing, Progressing  4/27/2023 2324 by Franck Barajas RN  Outcome: Ongoing, Progressing     Problem: Cerebral Tissue Perfusion (Stroke, Ischemic/Transient Ischemic Attack)  Goal: Optimal Cerebral Tissue Perfusion  4/27/2023 2324 by Franck Barajas RN  Outcome: Ongoing, Progressing  4/27/2023 2324 by Franck Barajas RN  Outcome: Ongoing, Progressing     Problem: Cognitive Impairment (Stroke, Ischemic/Transient Ischemic Attack)  Goal: Optimal Cognitive Function  4/27/2023 2324 by Franck Barajas, FRANCISCO  Outcome: Ongoing, Progressing  4/27/2023 2324 by Franck Barajas RN  Outcome: Ongoing, Progressing     Problem: Communication Impairment (Stroke,  Ischemic/Transient Ischemic Attack)  Goal: Improved Communication Skills  4/27/2023 2324 by Franck Barajas RN  Outcome: Ongoing, Progressing  4/27/2023 2324 by Franck Barajas RN  Outcome: Ongoing, Progressing     Problem: Functional Ability Impaired (Stroke, Ischemic/Transient Ischemic Attack)  Goal: Optimal Functional Ability  4/27/2023 2324 by Franck Barajas RN  Outcome: Ongoing, Progressing  4/27/2023 2324 by Franck Barajas RN  Outcome: Ongoing, Progressing     Problem: Respiratory Compromise (Stroke, Ischemic/Transient Ischemic Attack)  Goal: Effective Oxygenation and Ventilation  4/27/2023 2324 by Franck Barajas RN  Outcome: Ongoing, Progressing  4/27/2023 2324 by Franck Barajas RN  Outcome: Ongoing, Progressing     Problem: Sensorimotor Impairment (Stroke, Ischemic/Transient Ischemic Attack)  Goal: Improved Sensorimotor Function  4/27/2023 2324 by Franck Barajas, FRANCISCO  Outcome: Ongoing, Progressing  4/27/2023 2324 by Franck Barajas RN  Outcome: Ongoing, Progressing     Problem: Swallowing Impairment (Stroke, Ischemic/Transient Ischemic Attack)  Goal: Optimal Eating and Swallowing without Aspiration  4/27/2023 2324 by Franck Barajas RN  Outcome: Ongoing, Progressing  4/27/2023 2324 by Franck Barajas RN  Outcome: Ongoing, Progressing     Problem: Urinary Elimination Impaired (Stroke, Ischemic/Transient Ischemic Attack)  Goal: Effective Urinary Elimination  4/27/2023 2324 by Franck Barajas, FRANCISCO  Outcome: Ongoing, Progressing  4/27/2023 2324 by Franck Barajas RN  Outcome: Ongoing, Progressing

## 2023-04-28 NOTE — PT/OT/SLP PROGRESS
Occupational Therapy   Treatment    Name: Sandra Stephens  MRN: [id number]  Admitting Diagnosis:  Stroke       Recommendations:     Discharge Recommendations: home with home health  Discharge Equipment Recommendations:  walker, rolling  Barriers to discharge:       Assessment:     Sandra Stephens is a 67 y.o. female with a medical diagnosis of Stroke. Performance deficits affecting function are weakness, impaired endurance.     Rehab Prognosis:  Good; patient would benefit from acute skilled OT services to address these deficits and reach maximum level of function.       Plan:     Patient to be seen 3 x/week, 5 x/week to address the above listed problems via self-care/home management, therapeutic activities, therapeutic exercises  Plan of Care Expires:    Plan of Care Reviewed with: patient, grandchild(stacy)    Subjective     Pain/Comfort:  No complaints    Objective:     Communicated with: nurse prior to session.  Patient found sitting edge of bed with peripheral IV upon OT entry to room.    General Precautions: Standard, fall    Orthopedic Precautions:N/A  Braces: N/A  Respiratory Status: Room air     Occupational Performance:     Bed Mobility:    Patient completed Supine to Sit with modified independence     Functional Mobility/Transfers  Functional Mobility: Ambulate bed to bathroom and back with no AE, no loss of balance    [facility] 6 Click ADL: 18    GOALS:   Multidisciplinary Problems       Occupational Therapy Goals          Problem: Occupational Therapy    Goal Priority Disciplines Outcome Interventions   Occupational Therapy Goal     OT, PT/OT Ongoing, Progressing    Description: Goals to be met by: 5/10/2023     Patient will increase functional independence with ADLs by performing:    LE Dressing with Modified [place].  Grooming while standing with Modified [place].  Toileting from toilet with Modified [place] for hygiene and clothing management.   Toilet transfer to toilet with Modified [place].                          Time Tracking:     OT Date of Treatment: 04/28/23  OT Start Time: 1134  OT Stop Time: 1143  OT Total Time (min): 9 min    Billable Minutes:Therapeutic Activity 9    OT/ORACIO: ORACIO     Number of ORACIO visits since last OT visit: 2    4/28/2023

## 2023-04-28 NOTE — PT/OT/SLP PROGRESS
"Attempted to see pt for PT tx. Pt declined tx 2/2 her being d/c home later today. Pt states she has no questions are concerns about d/cing home. Pt confirms she has "a couple steps" to enter her home and that she will have help getting into her house. Pt declined to practice steps with PTA at this time and stated she knows she can do this once arriving home.   "

## 2023-05-01 DIAGNOSIS — I10 HYPERTENSION, UNSPECIFIED TYPE: ICD-10-CM

## 2023-05-01 PROCEDURE — G0180 MD CERTIFICATION HHA PATIENT: HCPCS | Mod: ,,, | Performed by: FAMILY MEDICINE

## 2023-05-01 PROCEDURE — G0180 PR HOME HEALTH MD CERTIFICATION: ICD-10-PCS | Mod: ,,, | Performed by: FAMILY MEDICINE

## 2023-05-01 RX ORDER — AMLODIPINE BESYLATE 5 MG/1
TABLET ORAL
Qty: 30 TABLET | Refills: 0 | Status: SHIPPED | OUTPATIENT
Start: 2023-05-01 | End: 2023-05-23 | Stop reason: SDUPTHER

## 2023-05-02 ENCOUNTER — PATIENT OUTREACH (OUTPATIENT)
Dept: ADMINISTRATIVE | Facility: CLINIC | Age: 68
End: 2023-05-02
Payer: MEDICARE

## 2023-05-02 NOTE — PROGRESS NOTES
C3 nurse attempted to contact Sandra Stephens  for a TCC post hospital discharge follow up call. No answer. Left voicemail with callback information. The patient has a scheduled HOSFU appointment with Rios Dexter MD  on 05/02/2023 @ 130 pm.

## 2023-05-03 ENCOUNTER — TELEPHONE (OUTPATIENT)
Dept: FAMILY MEDICINE | Facility: CLINIC | Age: 68
End: 2023-05-03
Payer: MEDICARE

## 2023-05-03 NOTE — TELEPHONE ENCOUNTER
----- Message from Isa Babb sent at 5/3/2023 10:28 AM CDT -----  Regarding: [facility]  [phone]    Called back to try and reschedule her tcm appt

## 2023-05-10 ENCOUNTER — EXTERNAL HOME HEALTH (OUTPATIENT)
Dept: HOME HEALTH SERVICES | Facility: HOSPITAL | Age: 68
End: 2023-05-10
Payer: MEDICARE

## 2023-05-23 ENCOUNTER — OFFICE VISIT (OUTPATIENT)
Dept: FAMILY MEDICINE | Facility: CLINIC | Age: 68
End: 2023-05-23
Payer: MEDICARE

## 2023-05-23 VITALS
RESPIRATION RATE: 18 BRPM | WEIGHT: 152.38 LBS | BODY MASS INDEX: 23.09 KG/M2 | HEIGHT: 68 IN | SYSTOLIC BLOOD PRESSURE: 132 MMHG | DIASTOLIC BLOOD PRESSURE: 86 MMHG | HEART RATE: 82 BPM | OXYGEN SATURATION: 100 %

## 2023-05-23 DIAGNOSIS — M79.601 RIGHT ARM PAIN: Primary | ICD-10-CM

## 2023-05-23 DIAGNOSIS — Z12.31 SCREENING MAMMOGRAM, ENCOUNTER FOR: ICD-10-CM

## 2023-05-23 DIAGNOSIS — R20.2 NUMBNESS AND TINGLING IN RIGHT HAND: ICD-10-CM

## 2023-05-23 DIAGNOSIS — I10 HYPERTENSION, UNSPECIFIED TYPE: Primary | ICD-10-CM

## 2023-05-23 DIAGNOSIS — R20.0 NUMBNESS AND TINGLING IN RIGHT HAND: ICD-10-CM

## 2023-05-23 PROCEDURE — 3075F PR MOST RECENT SYSTOLIC BLOOD PRESS GE 130-139MM HG: ICD-10-PCS | Mod: CPTII,,, | Performed by: FAMILY MEDICINE

## 2023-05-23 PROCEDURE — 1101F PR PT FALLS ASSESS DOC 0-1 FALLS W/OUT INJ PAST YR: ICD-10-PCS | Mod: CPTII,,, | Performed by: FAMILY MEDICINE

## 2023-05-23 PROCEDURE — 99214 OFFICE O/P EST MOD 30 MIN: CPT | Mod: ,,, | Performed by: FAMILY MEDICINE

## 2023-05-23 PROCEDURE — 1111F DSCHRG MED/CURRENT MED MERGE: CPT | Mod: CPTII,,, | Performed by: FAMILY MEDICINE

## 2023-05-23 PROCEDURE — 3288F PR FALLS RISK ASSESSMENT DOCUMENTED: ICD-10-PCS | Mod: CPTII,,, | Performed by: FAMILY MEDICINE

## 2023-05-23 PROCEDURE — 3079F PR MOST RECENT DIASTOLIC BLOOD PRESSURE 80-89 MM HG: ICD-10-PCS | Mod: CPTII,,, | Performed by: FAMILY MEDICINE

## 2023-05-23 PROCEDURE — 3008F PR BODY MASS INDEX (BMI) DOCUMENTED: ICD-10-PCS | Mod: CPTII,,, | Performed by: FAMILY MEDICINE

## 2023-05-23 PROCEDURE — 3288F FALL RISK ASSESSMENT DOCD: CPT | Mod: CPTII,,, | Performed by: FAMILY MEDICINE

## 2023-05-23 PROCEDURE — 1159F PR MEDICATION LIST DOCUMENTED IN MEDICAL RECORD: ICD-10-PCS | Mod: CPTII,,, | Performed by: FAMILY MEDICINE

## 2023-05-23 PROCEDURE — 1160F RVW MEDS BY RX/DR IN RCRD: CPT | Mod: CPTII,,, | Performed by: FAMILY MEDICINE

## 2023-05-23 PROCEDURE — 3075F SYST BP GE 130 - 139MM HG: CPT | Mod: CPTII,,, | Performed by: FAMILY MEDICINE

## 2023-05-23 PROCEDURE — 1125F PR PAIN SEVERITY QUANTIFIED, PAIN PRESENT: ICD-10-PCS | Mod: CPTII,,, | Performed by: FAMILY MEDICINE

## 2023-05-23 PROCEDURE — 1101F PT FALLS ASSESS-DOCD LE1/YR: CPT | Mod: CPTII,,, | Performed by: FAMILY MEDICINE

## 2023-05-23 PROCEDURE — 3008F BODY MASS INDEX DOCD: CPT | Mod: CPTII,,, | Performed by: FAMILY MEDICINE

## 2023-05-23 PROCEDURE — 1125F AMNT PAIN NOTED PAIN PRSNT: CPT | Mod: CPTII,,, | Performed by: FAMILY MEDICINE

## 2023-05-23 PROCEDURE — 1160F PR REVIEW ALL MEDS BY PRESCRIBER/CLIN PHARMACIST DOCUMENTED: ICD-10-PCS | Mod: CPTII,,, | Performed by: FAMILY MEDICINE

## 2023-05-23 PROCEDURE — 1159F MED LIST DOCD IN RCRD: CPT | Mod: CPTII,,, | Performed by: FAMILY MEDICINE

## 2023-05-23 PROCEDURE — 99214 PR OFFICE/OUTPT VISIT, EST, LEVL IV, 30-39 MIN: ICD-10-PCS | Mod: ,,, | Performed by: FAMILY MEDICINE

## 2023-05-23 PROCEDURE — 3079F DIAST BP 80-89 MM HG: CPT | Mod: CPTII,,, | Performed by: FAMILY MEDICINE

## 2023-05-23 PROCEDURE — 1111F PR DISCHARGE MEDS RECONCILED W/ CURRENT OUTPATIENT MED LIST: ICD-10-PCS | Mod: CPTII,,, | Performed by: FAMILY MEDICINE

## 2023-05-23 RX ORDER — AMLODIPINE BESYLATE 10 MG/1
10 TABLET ORAL DAILY
Qty: 30 TABLET | Refills: 3 | Status: SHIPPED | OUTPATIENT
Start: 2023-05-23 | End: 2024-03-04

## 2023-05-23 NOTE — PROGRESS NOTES
"Subjective:       Patient ID: Sandra Stephens is a 67 y.o. female.    Chief Complaint: Follow-up (B/P follow up/Numbness in right hand/ burning (started approx 1 week ago)/Nurse checks home blood pressures  /no readings on hand /Refill on tramadol )      Elevated BP    Follow-up  Associated symptoms include neck pain and numbness (right hand 4/5th finger, present for months, awakened with numbness). Pertinent negatives include no chest pain.     Hypertension  - tolerating medication (no side effects)  - no headaches  - patient reports elevated at home in the evening      Review of Systems   Constitutional: Negative.    Respiratory: Negative.  Negative for shortness of breath.    Cardiovascular: Negative.  Negative for chest pain and leg swelling.        Elevated BP   Musculoskeletal:  Positive for neck pain.   Neurological:  Positive for numbness (right hand 4/5th finger, present for months, awakened with numbness).         Objective:      /86 (BP Location: Left arm, Patient Position: Sitting, BP Method: Small (Automatic))   Pulse 82   Resp 18   Ht 5' 8" (1.727 m)   Wt 69.1 kg (152 lb 6.4 oz)   SpO2 100%   BMI 23.17 kg/m²    Physical Exam  Constitutional:       Appearance: Normal appearance.   Cardiovascular:      Rate and Rhythm: Normal rate and regular rhythm.      Heart sounds: Normal heart sounds.   Pulmonary:      Effort: Pulmonary effort is normal.      Breath sounds: Normal breath sounds.   Musculoskeletal:      Comments: Right hand:  Full range of motion, +2 radial pulse, no edema   Neurological:      Mental Status: She is alert.   Psychiatric:         Mood and Affect: Mood normal.         Behavior: Behavior normal.         Thought Content: Thought content normal.         Judgment: Judgment normal.             Assessment:       Problem List Items Addressed This Visit          Cardiac/Vascular    Hypertension - Primary    Relevant Medications    amLODIPine (NORVASC) 10 MG tablet     Other Visit " Diagnoses       Numbness and tingling in right hand        Relevant Orders    Ambulatory referral/consult to Neurology    Screening mammogram, encounter for        Relevant Orders    Mammo Digital Screening Bilat w/ Ravindra               Plan:   1. Hypertension, unspecified type  -     amLODIPine (NORVASC) 10 MG tablet; Take 1 tablet (10 mg total) by mouth once daily.  Dispense: 30 tablet; Refill: 3  Increase amlodipine to 10 mg q.day next item monitor BP   Return to clinic with any concerns     2. Numbness and tingling in right hand  -     Ambulatory referral/consult to Neurology; Future; Expected date: 05/30/2023  Refer patient to neurology for EMG study   Continue brace p.r.n.  Monitor  Return to clinic with any concerns     3. Screening mammogram, encounter for  -     Mammo Digital Screening Bilat w/ Ravindra; Future; Expected date: 05/23/2023

## 2023-05-25 ENCOUNTER — HOSPITAL ENCOUNTER (OUTPATIENT)
Dept: RADIOLOGY | Facility: HOSPITAL | Age: 68
Discharge: HOME | End: 2023-05-25
Attending: FAMILY MEDICINE
Payer: MEDICARE

## 2023-05-25 DIAGNOSIS — Z12.31 SCREENING MAMMOGRAM, ENCOUNTER FOR: ICD-10-CM

## 2023-05-25 PROCEDURE — 77067 MAMMO DIGITAL SCREENING BILAT WITH TOMO: ICD-10-PCS | Mod: 26,,, | Performed by: STUDENT IN AN ORGANIZED HEALTH CARE EDUCATION/TRAINING PROGRAM

## 2023-05-25 PROCEDURE — 77063 MAMMO DIGITAL SCREENING BILAT WITH TOMO: ICD-10-PCS | Mod: 26,,, | Performed by: STUDENT IN AN ORGANIZED HEALTH CARE EDUCATION/TRAINING PROGRAM

## 2023-05-25 PROCEDURE — 77063 BREAST TOMOSYNTHESIS BI: CPT | Mod: 26,,, | Performed by: STUDENT IN AN ORGANIZED HEALTH CARE EDUCATION/TRAINING PROGRAM

## 2023-05-25 PROCEDURE — 77067 SCR MAMMO BI INCL CAD: CPT | Mod: TC

## 2023-05-25 PROCEDURE — 77067 SCR MAMMO BI INCL CAD: CPT | Mod: 26,,, | Performed by: STUDENT IN AN ORGANIZED HEALTH CARE EDUCATION/TRAINING PROGRAM

## 2023-05-30 ENCOUNTER — TELEPHONE (OUTPATIENT)
Dept: FAMILY MEDICINE | Facility: CLINIC | Age: 68
End: 2023-05-30

## 2023-05-30 NOTE — TELEPHONE ENCOUNTER
----- Message from Rios Dexter MD sent at 5/30/2023  1:24 PM CDT -----  Normal MMG. Repeat in 1 year.

## 2023-06-07 DIAGNOSIS — F41.9 ANXIETY: ICD-10-CM

## 2023-06-07 DIAGNOSIS — F32.A DEPRESSION, UNSPECIFIED DEPRESSION TYPE: ICD-10-CM

## 2023-06-07 RX ORDER — DULOXETIN HYDROCHLORIDE 60 MG/1
CAPSULE, DELAYED RELEASE ORAL
Qty: 30 CAPSULE | Refills: 1 | Status: SHIPPED | OUTPATIENT
Start: 2023-06-07 | End: 2023-12-11

## 2023-06-07 RX ORDER — HYDROXYZINE PAMOATE 25 MG/1
CAPSULE ORAL
Qty: 60 CAPSULE | Refills: 1 | Status: SHIPPED | OUTPATIENT
Start: 2023-06-07 | End: 2023-12-11

## 2023-06-08 DIAGNOSIS — I10 HYPERTENSION, UNSPECIFIED TYPE: Primary | ICD-10-CM

## 2023-06-08 RX ORDER — ATORVASTATIN CALCIUM 40 MG/1
40 TABLET, FILM COATED ORAL DAILY
Qty: 30 TABLET | Refills: 1 | Status: SHIPPED | OUTPATIENT
Start: 2023-06-08 | End: 2023-12-11

## 2023-07-17 ENCOUNTER — LAB VISIT (OUTPATIENT)
Dept: LAB | Facility: HOSPITAL | Age: 68
End: 2023-07-17
Attending: FAMILY MEDICINE
Payer: MEDICAID

## 2023-07-17 DIAGNOSIS — Z11.59 NEED FOR HEPATITIS C SCREENING TEST: ICD-10-CM

## 2023-07-17 DIAGNOSIS — Z13.6 SCREENING FOR ISCHEMIC HEART DISEASE: ICD-10-CM

## 2023-07-17 DIAGNOSIS — Z00.00 WELLNESS EXAMINATION: ICD-10-CM

## 2023-07-17 LAB
ALBUMIN SERPL-MCNC: 3.9 G/DL (ref 3.4–4.8)
ALBUMIN/GLOB SERPL: 1.1 RATIO (ref 1.1–2)
ALP SERPL-CCNC: 60 UNIT/L (ref 40–150)
ALT SERPL-CCNC: 31 UNIT/L (ref 0–55)
AST SERPL-CCNC: 33 UNIT/L (ref 5–34)
BASOPHILS # BLD AUTO: 0.03 X10(3)/MCL
BASOPHILS NFR BLD AUTO: 1 %
BILIRUBIN DIRECT+TOT PNL SERPL-MCNC: 0.6 MG/DL
BUN SERPL-MCNC: 11 MG/DL (ref 9.8–20.1)
CALCIUM SERPL-MCNC: 10.4 MG/DL (ref 8.4–10.2)
CHLORIDE SERPL-SCNC: 105 MMOL/L (ref 98–107)
CHOLEST SERPL-MCNC: 162 MG/DL
CHOLEST/HDLC SERPL: 2 {RATIO} (ref 0–5)
CO2 SERPL-SCNC: 32 MMOL/L (ref 23–31)
CREAT SERPL-MCNC: 0.8 MG/DL (ref 0.55–1.02)
EOSINOPHIL # BLD AUTO: 0.09 X10(3)/MCL (ref 0–0.9)
EOSINOPHIL NFR BLD AUTO: 2.9 %
ERYTHROCYTE [DISTWIDTH] IN BLOOD BY AUTOMATED COUNT: 12.6 % (ref 11.5–17)
GFR SERPLBLD CREATININE-BSD FMLA CKD-EPI: >60 MLS/MIN/1.73/M2
GLOBULIN SER-MCNC: 3.7 GM/DL (ref 2.4–3.5)
GLUCOSE SERPL-MCNC: 98 MG/DL (ref 82–115)
HCT VFR BLD AUTO: 39 % (ref 37–47)
HCV AB SERPL QL IA: REACTIVE
HDLC SERPL-MCNC: 73 MG/DL (ref 35–60)
HGB BLD-MCNC: 12.5 G/DL (ref 12–16)
IMM GRANULOCYTES # BLD AUTO: 0 X10(3)/MCL (ref 0–0.04)
IMM GRANULOCYTES NFR BLD AUTO: 0 %
LDLC SERPL CALC-MCNC: 75 MG/DL (ref 50–140)
LYMPHOCYTES # BLD AUTO: 1.24 X10(3)/MCL (ref 0.6–4.6)
LYMPHOCYTES NFR BLD AUTO: 39.6 %
MCH RBC QN AUTO: 30.3 PG (ref 27–31)
MCHC RBC AUTO-ENTMCNC: 32.1 G/DL (ref 33–36)
MCV RBC AUTO: 94.4 FL (ref 80–94)
MONOCYTES # BLD AUTO: 0.36 X10(3)/MCL (ref 0.1–1.3)
MONOCYTES NFR BLD AUTO: 11.5 %
NEUTROPHILS # BLD AUTO: 1.41 X10(3)/MCL (ref 2.1–9.2)
NEUTROPHILS NFR BLD AUTO: 45 %
NRBC BLD AUTO-RTO: 0 %
PLATELET # BLD AUTO: 203 X10(3)/MCL (ref 130–400)
PMV BLD AUTO: 10.9 FL (ref 7.4–10.4)
POTASSIUM SERPL-SCNC: 4.3 MMOL/L (ref 3.5–5.1)
PROT SERPL-MCNC: 7.6 GM/DL (ref 5.8–7.6)
RBC # BLD AUTO: 4.13 X10(6)/MCL (ref 4.2–5.4)
SODIUM SERPL-SCNC: 142 MMOL/L (ref 136–145)
TRIGL SERPL-MCNC: 70 MG/DL (ref 37–140)
VLDLC SERPL CALC-MCNC: 14 MG/DL
WBC # SPEC AUTO: 3.13 X10(3)/MCL (ref 4.5–11.5)

## 2023-07-17 PROCEDURE — 86803 HEPATITIS C AB TEST: CPT

## 2023-07-17 PROCEDURE — 80061 LIPID PANEL: CPT

## 2023-07-17 PROCEDURE — 85025 COMPLETE CBC W/AUTO DIFF WBC: CPT

## 2023-07-17 PROCEDURE — 36415 COLL VENOUS BLD VENIPUNCTURE: CPT

## 2023-07-17 PROCEDURE — 80053 COMPREHEN METABOLIC PANEL: CPT

## 2023-07-18 ENCOUNTER — OFFICE VISIT (OUTPATIENT)
Dept: FAMILY MEDICINE | Facility: CLINIC | Age: 68
End: 2023-07-18
Payer: MEDICARE

## 2023-07-18 VITALS
HEIGHT: 68 IN | BODY MASS INDEX: 22.94 KG/M2 | SYSTOLIC BLOOD PRESSURE: 112 MMHG | OXYGEN SATURATION: 100 % | DIASTOLIC BLOOD PRESSURE: 72 MMHG | WEIGHT: 151.38 LBS | TEMPERATURE: 98 F | HEART RATE: 94 BPM | RESPIRATION RATE: 20 BRPM

## 2023-07-18 DIAGNOSIS — Z00.00 WELLNESS EXAMINATION: Primary | ICD-10-CM

## 2023-07-18 DIAGNOSIS — R76.8 HEPATITIS C ANTIBODY POSITIVE IN BLOOD: ICD-10-CM

## 2023-07-18 DIAGNOSIS — Z78.0 ENCOUNTER FOR OSTEOPOROSIS SCREENING IN ASYMPTOMATIC POSTMENOPAUSAL PATIENT: ICD-10-CM

## 2023-07-18 DIAGNOSIS — Z12.11 COLON CANCER SCREENING: ICD-10-CM

## 2023-07-18 DIAGNOSIS — Z13.820 ENCOUNTER FOR OSTEOPOROSIS SCREENING IN ASYMPTOMATIC POSTMENOPAUSAL PATIENT: ICD-10-CM

## 2023-07-18 PROCEDURE — 3288F FALL RISK ASSESSMENT DOCD: CPT | Mod: CPTII,,, | Performed by: FAMILY MEDICINE

## 2023-07-18 PROCEDURE — 3074F PR MOST RECENT SYSTOLIC BLOOD PRESSURE < 130 MM HG: ICD-10-PCS | Mod: CPTII,,, | Performed by: FAMILY MEDICINE

## 2023-07-18 PROCEDURE — 1126F AMNT PAIN NOTED NONE PRSNT: CPT | Mod: CPTII,,, | Performed by: FAMILY MEDICINE

## 2023-07-18 PROCEDURE — 1159F PR MEDICATION LIST DOCUMENTED IN MEDICAL RECORD: ICD-10-PCS | Mod: CPTII,,, | Performed by: FAMILY MEDICINE

## 2023-07-18 PROCEDURE — 1158F PR ADVANCE CARE PLANNING DISCUSS DOCUMENTED IN MEDICAL RECORD: ICD-10-PCS | Mod: CPTII,,, | Performed by: FAMILY MEDICINE

## 2023-07-18 PROCEDURE — 1159F MED LIST DOCD IN RCRD: CPT | Mod: CPTII,,, | Performed by: FAMILY MEDICINE

## 2023-07-18 PROCEDURE — 3008F BODY MASS INDEX DOCD: CPT | Mod: CPTII,,, | Performed by: FAMILY MEDICINE

## 2023-07-18 PROCEDURE — 3288F PR FALLS RISK ASSESSMENT DOCUMENTED: ICD-10-PCS | Mod: CPTII,,, | Performed by: FAMILY MEDICINE

## 2023-07-18 PROCEDURE — 3044F HG A1C LEVEL LT 7.0%: CPT | Mod: CPTII,,, | Performed by: FAMILY MEDICINE

## 2023-07-18 PROCEDURE — G0402 INITIAL PREVENTIVE EXAM: HCPCS | Mod: ,,, | Performed by: FAMILY MEDICINE

## 2023-07-18 PROCEDURE — 1101F PT FALLS ASSESS-DOCD LE1/YR: CPT | Mod: CPTII,,, | Performed by: FAMILY MEDICINE

## 2023-07-18 PROCEDURE — 3044F PR MOST RECENT HEMOGLOBIN A1C LEVEL <7.0%: ICD-10-PCS | Mod: CPTII,,, | Performed by: FAMILY MEDICINE

## 2023-07-18 PROCEDURE — 3008F PR BODY MASS INDEX (BMI) DOCUMENTED: ICD-10-PCS | Mod: CPTII,,, | Performed by: FAMILY MEDICINE

## 2023-07-18 PROCEDURE — 3078F PR MOST RECENT DIASTOLIC BLOOD PRESSURE < 80 MM HG: ICD-10-PCS | Mod: CPTII,,, | Performed by: FAMILY MEDICINE

## 2023-07-18 PROCEDURE — 1158F ADVNC CARE PLAN TLK DOCD: CPT | Mod: CPTII,,, | Performed by: FAMILY MEDICINE

## 2023-07-18 PROCEDURE — 3078F DIAST BP <80 MM HG: CPT | Mod: CPTII,,, | Performed by: FAMILY MEDICINE

## 2023-07-18 PROCEDURE — 1160F RVW MEDS BY RX/DR IN RCRD: CPT | Mod: CPTII,,, | Performed by: FAMILY MEDICINE

## 2023-07-18 PROCEDURE — 1101F PR PT FALLS ASSESS DOC 0-1 FALLS W/OUT INJ PAST YR: ICD-10-PCS | Mod: CPTII,,, | Performed by: FAMILY MEDICINE

## 2023-07-18 PROCEDURE — 1126F PR PAIN SEVERITY QUANTIFIED, NO PAIN PRESENT: ICD-10-PCS | Mod: CPTII,,, | Performed by: FAMILY MEDICINE

## 2023-07-18 PROCEDURE — 1160F PR REVIEW ALL MEDS BY PRESCRIBER/CLIN PHARMACIST DOCUMENTED: ICD-10-PCS | Mod: CPTII,,, | Performed by: FAMILY MEDICINE

## 2023-07-18 PROCEDURE — 3074F SYST BP LT 130 MM HG: CPT | Mod: CPTII,,, | Performed by: FAMILY MEDICINE

## 2023-07-18 PROCEDURE — G0402 PR WELCOME MEDICARE PREVENTIVE VISIT NEW ENROLLEE: ICD-10-PCS | Mod: ,,, | Performed by: FAMILY MEDICINE

## 2023-07-18 RX ORDER — IBUPROFEN 800 MG/1
800 TABLET ORAL EVERY 6 HOURS PRN
Qty: 20 TABLET | Refills: 0 | Status: SHIPPED | OUTPATIENT
Start: 2023-07-18 | End: 2023-07-23

## 2023-07-18 NOTE — PROGRESS NOTES
Patient ID: [id number]     Chief Complaint: Medicare AWV      HPI:     Sandra Stephens is a 67 y.o. female here today for a Medicare Wellness.       Opioid Screening: Patient medication list reviewed, patient is not taking prescription opioids. Patient is not using additional opioids than prescribed. Patient is not at low risk of substance abuse based on this opioid use history.       ----------------------------  Anxiety  Hypertension  Personal history of colonic polyps      Comment:  COLONOSCOPY  Sciatic nerve injury  Stroke     Past Surgical History:   Procedure Laterality Date    ADENOIDECTOMY      APPENDECTOMY      CHOLECYSTECTOMY      COLONOSCOPY  10/01/2020    Castro Holm MD    TONSILLECTOMY         Review of patient's allergies indicates:  No Known Allergies    Outpatient Medications Marked as Taking for the 7/18/23 encounter (Office Visit) with Rios Dexter MD   Medication Sig Dispense Refill    amLODIPine (NORVASC) 10 MG tablet Take 1 tablet (10 mg total) by mouth once daily. 30 tablet 3    aspirin (ECOTRIN) 81 MG EC tablet Take 1 tablet (81 mg total) by mouth once daily.  0    atorvastatin (LIPITOR) 40 MG tablet Take 1 tablet (40 mg total) by mouth once daily. 30 tablet 1    DULoxetine (CYMBALTA) 60 MG capsule TAKE ONE CAPSULE BY MOUTH DAILY 30 capsule 1    gabapentin (NEURONTIN) 600 MG tablet TAKE ONE TABLET BY MOUTH THREE TIMES DAILY 270 tablet 1    hydrOXYzine pamoate (VISTARIL) 25 MG Cap TAKE ONE CAPSULE BY MOUTH TWICE DAILY AS NEEDED FOR ANXIETY 60 capsule 1    meloxicam (MOBIC) 7.5 MG tablet TAKE 1 TABLET BY MOUTH DAILY WITH FOOD AS NEEDED FOR PAIN 30 tablet 1    nabumetone (RELAFEN) 500 MG tablet Take 1 tablet (500 mg total) by mouth 2 (two) times daily. 60 tablet 1    pantoprazole (PROTONIX) 40 MG tablet TAKE ONE TABLET BY MOUTH EVERY DAY 30 tablet 3    [DISCONTINUED] traMADoL (ULTRAM) 50 mg tablet Take 1 tablet (50 mg total) by mouth every 6 (six) hours as needed for Pain. 12  "tablet 0       Social History     Socioeconomic History    Marital status:    Tobacco Use    Smoking status: Former     Packs/day: 0.25     Types: Cigarettes    Smokeless tobacco: Never   Substance and Sexual Activity    Alcohol use: Yes    Drug use: Never    Sexual activity: Not Currently     Social Determinants of Health     Financial Resource Strain: Low Risk     Difficulty of Paying Living Expenses: Not hard at all   Food Insecurity: No Food Insecurity    Worried About Running Out of Food in the Last Year: Never true    Ran Out of Food in the Last Year: Never true   Transportation Needs: No Transportation Needs    Lack of Transportation (Medical): No    Lack of Transportation (Non-Medical): No   Physical Activity: Sufficiently Active    Days of Exercise per Week: 5 days    Minutes of Exercise per Session: 30 min   Stress: No Stress Concern Present    Feeling of Stress : Not at all        History reviewed. No pertinent family history.     Patient Care Team:  Rios Dexter MD as PCP - General (Family Medicine)  Castro Holm MD as Consulting Physician (Gastroenterology)       Subjective:     Review of Systems   Constitutional: Negative.    Respiratory: Negative.     Cardiovascular: Negative.    Gastrointestinal: Negative.    Musculoskeletal:  Positive for joint pain.   Psychiatric/Behavioral: Negative.         Patient Reported Health Risk Assessment       Objective:     /72 (BP Location: Left arm, Patient Position: Sitting, BP Method: Large (Manual))   Pulse 94   Temp 97.8 °F (36.6 °C)   Resp 20   Ht 5' 8" (1.727 m)   Wt 68.7 kg (151 lb 6.4 oz)   SpO2 100%   BMI 23.02 kg/m²     Physical Exam  Constitutional:       Appearance: Normal appearance.   Cardiovascular:      Rate and Rhythm: Normal rate and regular rhythm.   Pulmonary:      Effort: Pulmonary effort is normal.      Breath sounds: Normal breath sounds.   Abdominal:      General: Abdomen is flat. Bowel sounds are " normal.      Palpations: Abdomen is soft. There is no hepatomegaly or splenomegaly.   Neurological:      Mental Status: She is alert.   Psychiatric:         Mood and Affect: Mood normal.         Behavior: Behavior normal.         Thought Content: Thought content normal.         Judgment: Judgment normal.     Recent Results (from the past 504 hour(s))   Comprehensive Metabolic Panel    Collection Time: 07/17/23 11:31 AM   Result Value Ref Range    Sodium Level 142 136 - 145 mmol/L    Potassium Level 4.3 3.5 - 5.1 mmol/L    Chloride 105 98 - 107 mmol/L    Carbon Dioxide 32 (H) 23 - 31 mmol/L    Glucose Level 98 82 - 115 mg/dL    Blood Urea Nitrogen 11.0 9.8 - 20.1 mg/dL    Creatinine 0.80 0.55 - 1.02 mg/dL    Calcium Level Total 10.4 (H) 8.4 - 10.2 mg/dL    Protein Total 7.6 5.8 - 7.6 gm/dL    Albumin Level 3.9 3.4 - 4.8 g/dL    Globulin 3.7 (H) 2.4 - 3.5 gm/dL    Albumin/Globulin Ratio 1.1 1.1 - 2.0 ratio    Bilirubin Total 0.6 <=1.5 mg/dL    Alkaline Phosphatase 60 40 - 150 unit/L    Alanine Aminotransferase 31 0 - 55 unit/L    Aspartate Aminotransferase 33 5 - 34 unit/L    eGFR >60 mls/min/1.73/m2   Hepatitis C Antibody    Collection Time: 07/17/23 11:31 AM   Result Value Ref Range    Hep C Ab Interp Reactive (A) Nonreactive   Lipid Panel    Collection Time: 07/17/23 11:31 AM   Result Value Ref Range    Cholesterol Total 162 <=200 mg/dL    HDL Cholesterol 73 (H) 35 - 60 mg/dL    Triglyceride 70 37 - 140 mg/dL    Cholesterol/HDL Ratio 2 0 - 5    Very Low Density Lipoprotein 14     LDL Cholesterol 75.00 50.00 - 140.00 mg/dL   CBC with Differential    Collection Time: 07/17/23 11:31 AM   Result Value Ref Range    WBC 3.13 (L) 4.50 - 11.50 x10(3)/mcL    RBC 4.13 (L) 4.20 - 5.40 x10(6)/mcL    Hgb 12.5 12.0 - 16.0 g/dL    Hct 39.0 37.0 - 47.0 %    MCV 94.4 (H) 80.0 - 94.0 fL    MCH 30.3 27.0 - 31.0 pg    MCHC 32.1 (L) 33.0 - 36.0 g/dL    RDW 12.6 11.5 - 17.0 %    Platelet 203 130 - 400 x10(3)/mcL    MPV 10.9 (H) 7.4 -  10.4 fL    Neut % 45.0 %    Lymph % 39.6 %    Mono % 11.5 %    Eos % 2.9 %    Basophil % 1.0 %    Lymph # 1.24 0.6 - 4.6 x10(3)/mcL    Neut # 1.41 (L) 2.1 - 9.2 x10(3)/mcL    Mono # 0.36 0.1 - 1.3 x10(3)/mcL    Eos # 0.09 0 - 0.9 x10(3)/mcL    Baso # 0.03 <=0.2 x10(3)/mcL    IG# 0.00 0 - 0.04 x10(3)/mcL    IG% 0.0 %    NRBC% 0.0 %          No flowsheet data found.  Fall Risk Assessment - Outpatient 7/18/2023 5/23/2023 3/31/2023 10/10/2022   Mobility Status Ambulatory - Ambulatory Ambulatory w/ assistance   Number of falls 0 0 0 2+   Identified as fall risk 0 - 0 1              Assessment/Plan:     1. Wellness examination  Lab work reviewed with patient  Continue current medication  Continue diet/exercise  Advanced directive discussed with patient  Return to clinic with any concerns    Advance Care Planning     Date: 07/18/2023    Living Will  During this visit, I engaged the patient  in the voluntary advance care planning process.  The patient and I reviewed the role for advance directives and their purpose in directing future healthcare if the patient's unable to speak for him/herself.  At this point in time, the patient does have full decision-making capacity.  We discussed different extreme health states that she could experience, and reviewed what kind of medical care she would want in those situations.  The patient communicated that if she were comatose and had little chance of a meaningful recovery, she would want machines/life-sustaining treatments used.  I spent a total of 5 minutes engaging the patient in this advance care planning discussion.     2. Hepatitis C antibody positive in blood  -     Ambulatory referral/consult to Gastroenterology; Future; Expected date: 07/25/2023  Refer patient to GI    3. Encounter for osteoporosis screening in asymptomatic postmenopausal patient  -     DXA Bone Density Axial Skeleton 1 or more sites; Future; Expected date: 07/18/2023  Schedule bone density scan at [facility]  General     4. Colon cancer screening  -     Cologuard Screening (Multitarget Stool DNA); Future; Expected date: 07/18/2023  Patient defers colonoscopy; schedule Cologuard         Medicare Annual Wellness and Personalized Prevention Plan:   Fall Risk + Home Safety + Hearing Impairment + Depression Screen + Opioid and Substance Abuse Screening + Cognitive Impairment Screen + Health Risk Assessment all reviewed.     Health Maintenance Topics with due status: Not Due       Topic Last Completion Date    Mammogram 05/26/2023    Lipid Panel 07/17/2023    Influenza Vaccine Not Due      The patient's Health Maintenance was reviewed and the following appears to be due at this time:   Health Maintenance Due   Topic Date Due    HIV Screening  Never done    TETANUS VACCINE  Never done    DEXA Scan  Never done    Shingles Vaccine (1 of 2) Never done    COVID-19 Vaccine (2 - Moderna series) 09/21/2021    Pneumococcal Vaccines (Age 65+) (1 - PCV) Never done    Colorectal Cancer Screening  10/01/2023       Advance Care Planning   I attest to discussing Advance Care Planning with patient and/or family member.  Education was provided including the importance of the Health Care Power of , Advance Directives, and/or [place] documentation.  The patient expressed understanding to the importance of this information and discussion.         Follow up in about 6 months (around 1/18/2024). In addition to their scheduled follow up, the patient has also been instructed to follow up on as needed basis.

## 2023-07-27 ENCOUNTER — HOSPITAL ENCOUNTER (OUTPATIENT)
Dept: RADIOLOGY | Facility: HOSPITAL | Age: 68
Discharge: HOME | End: 2023-07-27
Attending: FAMILY MEDICINE
Payer: MEDICARE

## 2023-07-27 DIAGNOSIS — Z13.820 ENCOUNTER FOR OSTEOPOROSIS SCREENING IN ASYMPTOMATIC POSTMENOPAUSAL PATIENT: ICD-10-CM

## 2023-07-27 DIAGNOSIS — Z78.0 ENCOUNTER FOR OSTEOPOROSIS SCREENING IN ASYMPTOMATIC POSTMENOPAUSAL PATIENT: ICD-10-CM

## 2023-07-27 PROCEDURE — 77080 DXA BONE DENSITY AXIAL: CPT | Mod: TC

## 2023-08-09 ENCOUNTER — TELEPHONE (OUTPATIENT)
Dept: FAMILY MEDICINE | Facility: CLINIC | Age: 68
End: 2023-08-09
Payer: MEDICARE

## 2023-08-09 NOTE — TELEPHONE ENCOUNTER
----- Message from Rios Dexter MD sent at 8/8/2023  5:25 PM CDT -----  DEXA Results: Please inform patient of DEXA results, which show OSTEOPENIA. This is a mild thinning of the bone. Encourage Calcium 600mg + Vitamin D 800units BID plus weight bearing exercise at least 3 days per week. Repeat in 2 years.

## 2023-09-25 DIAGNOSIS — F41.9 ANXIETY: ICD-10-CM

## 2023-09-25 RX ORDER — PANTOPRAZOLE SODIUM 40 MG/1
TABLET, DELAYED RELEASE ORAL
Qty: 30 TABLET | Refills: 3 | Status: SHIPPED | OUTPATIENT
Start: 2023-09-25

## 2023-09-26 LAB — NONINV COLON CA DNA+OCC BLD SCRN STL QL: NORMAL

## 2023-12-09 DIAGNOSIS — F32.A DEPRESSION, UNSPECIFIED DEPRESSION TYPE: ICD-10-CM

## 2023-12-09 DIAGNOSIS — I10 HYPERTENSION, UNSPECIFIED TYPE: ICD-10-CM

## 2023-12-09 DIAGNOSIS — F41.9 ANXIETY: ICD-10-CM

## 2023-12-11 RX ORDER — DULOXETIN HYDROCHLORIDE 60 MG/1
CAPSULE, DELAYED RELEASE ORAL
Qty: 30 CAPSULE | Refills: 1 | Status: SHIPPED | OUTPATIENT
Start: 2023-12-11

## 2023-12-11 RX ORDER — ATORVASTATIN CALCIUM 40 MG/1
40 TABLET, FILM COATED ORAL
Qty: 30 TABLET | Refills: 1 | Status: SHIPPED | OUTPATIENT
Start: 2023-12-11

## 2023-12-11 RX ORDER — HYDROXYZINE PAMOATE 25 MG/1
CAPSULE ORAL
Qty: 60 CAPSULE | Refills: 1 | Status: SHIPPED | OUTPATIENT
Start: 2023-12-11

## 2023-12-15 LAB — NONINV COLON CA DNA+OCC BLD SCRN STL QL: NEGATIVE

## 2023-12-18 ENCOUNTER — TELEPHONE (OUTPATIENT)
Dept: FAMILY MEDICINE | Facility: CLINIC | Age: 68
End: 2023-12-18
Payer: MEDICARE

## 2023-12-18 NOTE — TELEPHONE ENCOUNTER
----- Message from Rios Dexter MD sent at 12/18/2023  9:44 AM CST -----  Negative (normal) Cologuard. Repeat in 3 years.

## 2024-03-01 DIAGNOSIS — I10 HYPERTENSION, UNSPECIFIED TYPE: ICD-10-CM

## 2024-03-04 RX ORDER — AMLODIPINE BESYLATE 10 MG/1
10 TABLET ORAL
Qty: 30 TABLET | Refills: 3 | Status: SHIPPED | OUTPATIENT
Start: 2024-03-04

## 2024-03-07 ENCOUNTER — OFFICE VISIT (OUTPATIENT)
Dept: FAMILY MEDICINE | Facility: CLINIC | Age: 69
End: 2024-03-07
Payer: MEDICARE

## 2024-03-07 VITALS
HEART RATE: 92 BPM | OXYGEN SATURATION: 100 % | SYSTOLIC BLOOD PRESSURE: 118 MMHG | WEIGHT: 142 LBS | HEIGHT: 68 IN | DIASTOLIC BLOOD PRESSURE: 82 MMHG | TEMPERATURE: 97 F | RESPIRATION RATE: 18 BRPM | BODY MASS INDEX: 21.52 KG/M2

## 2024-03-07 DIAGNOSIS — M54.16 LUMBAR RADICULOPATHY, CHRONIC: ICD-10-CM

## 2024-03-07 DIAGNOSIS — E78.49 OTHER HYPERLIPIDEMIA: ICD-10-CM

## 2024-03-07 DIAGNOSIS — I10 HYPERTENSION, UNSPECIFIED TYPE: Primary | ICD-10-CM

## 2024-03-07 PROCEDURE — 1159F MED LIST DOCD IN RCRD: CPT | Mod: CPTII,,, | Performed by: FAMILY MEDICINE

## 2024-03-07 PROCEDURE — 1100F PTFALLS ASSESS-DOCD GE2>/YR: CPT | Mod: CPTII,,, | Performed by: FAMILY MEDICINE

## 2024-03-07 PROCEDURE — 3008F BODY MASS INDEX DOCD: CPT | Mod: CPTII,,, | Performed by: FAMILY MEDICINE

## 2024-03-07 PROCEDURE — 3079F DIAST BP 80-89 MM HG: CPT | Mod: CPTII,,, | Performed by: FAMILY MEDICINE

## 2024-03-07 PROCEDURE — 99214 OFFICE O/P EST MOD 30 MIN: CPT | Mod: ,,, | Performed by: FAMILY MEDICINE

## 2024-03-07 PROCEDURE — 1160F RVW MEDS BY RX/DR IN RCRD: CPT | Mod: CPTII,,, | Performed by: FAMILY MEDICINE

## 2024-03-07 PROCEDURE — 1125F AMNT PAIN NOTED PAIN PRSNT: CPT | Mod: CPTII,,, | Performed by: FAMILY MEDICINE

## 2024-03-07 PROCEDURE — 3288F FALL RISK ASSESSMENT DOCD: CPT | Mod: CPTII,,, | Performed by: FAMILY MEDICINE

## 2024-03-07 PROCEDURE — 3074F SYST BP LT 130 MM HG: CPT | Mod: CPTII,,, | Performed by: FAMILY MEDICINE

## 2024-03-07 NOTE — PROGRESS NOTES
"Subjective:       Patient ID: Sandra Stephens is a 68 y.o. female.    Chief Complaint: 6 MONTH, BACK PAIN and SEVERE R HIP PAIN      Routine      Hypertension  - tolerating medication (no side effects)  - no headaches  - patient without any complaints    Hyperlipidemia  - tolerating medication (no side effects)   - no myalgia  - watching diet    Review of Systems   Constitutional: Negative.    Respiratory: Negative.     Cardiovascular: Negative.    Gastrointestinal: Negative.  Negative for fecal incontinence.   Genitourinary:  Negative for bladder incontinence.   Musculoskeletal:  Positive for back pain.        Back pain: radiates to right hip, reports fall, hx of prior MRI   Psychiatric/Behavioral: Negative.             Objective:      /82 (BP Location: Left arm, Patient Position: Sitting, BP Method: Large (Manual))   Pulse 92   Temp 96.9 °F (36.1 °C)   Resp 18   Ht 5' 8" (1.727 m)   Wt 64.4 kg (142 lb)   SpO2 100%   BMI 21.59 kg/m²    Physical Exam  Constitutional:       Appearance: Normal appearance.   Cardiovascular:      Rate and Rhythm: Normal rate and regular rhythm.      Heart sounds: Normal heart sounds.   Pulmonary:      Effort: Pulmonary effort is normal.      Breath sounds: Normal breath sounds.   Musculoskeletal:      Comments: Antalgic gait   Neurological:      Mental Status: She is alert.   Psychiatric:         Mood and Affect: Mood normal.         Behavior: Behavior normal.         Thought Content: Thought content normal.         Judgment: Judgment normal.               Assessment:       Problem List Items Addressed This Visit          Cardiac/Vascular    Hypertension - Primary    Other hyperlipidemia     Other Visit Diagnoses       Lumbar radiculopathy, chronic        Relevant Orders    MRI Lumbar Spine Without Contrast               Plan:   1. Hypertension, unspecified type  Controlled  Continue current Rx medication  Return to clinic with any concerns    2. Other hyperlipidemia  Continue " current Rx medication  FLP with next lab work  Return to clinic with any concerns    3. Lumbar radiculopathy, chronic  -     MRI Lumbar Spine Without Contrast; Future; Expected date: 03/07/2024  Schedule MRI L spine  ER precautions  Return to clinic with any concerns

## 2024-04-01 ENCOUNTER — HOSPITAL ENCOUNTER (EMERGENCY)
Facility: HOSPITAL | Age: 69
Discharge: HOME | End: 2024-04-01
Attending: EMERGENCY MEDICINE
Payer: MEDICARE

## 2024-04-01 VITALS
RESPIRATION RATE: 18 BRPM | WEIGHT: 145 LBS | OXYGEN SATURATION: 100 % | DIASTOLIC BLOOD PRESSURE: 88 MMHG | HEIGHT: 68 IN | TEMPERATURE: 96 F | SYSTOLIC BLOOD PRESSURE: 119 MMHG | BODY MASS INDEX: 21.98 KG/M2 | HEART RATE: 95 BPM

## 2024-04-01 DIAGNOSIS — J98.01 BRONCHOSPASM, ACUTE: Primary | ICD-10-CM

## 2024-04-01 PROCEDURE — 25000242 PHARM REV CODE 250 ALT 637 W/ HCPCS: Performed by: EMERGENCY MEDICINE

## 2024-04-01 PROCEDURE — 96372 THER/PROPH/DIAG INJ SC/IM: CPT | Performed by: EMERGENCY MEDICINE

## 2024-04-01 PROCEDURE — 63600175 PHARM REV CODE 636 W HCPCS: Performed by: EMERGENCY MEDICINE

## 2024-04-01 PROCEDURE — 99284 EMERGENCY DEPT VISIT MOD MDM: CPT | Mod: 25

## 2024-04-01 PROCEDURE — 99900031 HC PATIENT EDUCATION (STAT)

## 2024-04-01 PROCEDURE — 94640 AIRWAY INHALATION TREATMENT: CPT

## 2024-04-01 RX ORDER — ALBUTEROL SULFATE 90 UG/1
1-2 AEROSOL, METERED RESPIRATORY (INHALATION) EVERY 6 HOURS PRN
Qty: 18 G | Refills: 0 | Status: SHIPPED | OUTPATIENT
Start: 2024-04-01

## 2024-04-01 RX ORDER — METHYLPREDNISOLONE 4 MG/1
TABLET ORAL
Qty: 21 EACH | Refills: 0 | Status: SHIPPED | OUTPATIENT
Start: 2024-04-01 | End: 2024-04-22

## 2024-04-01 RX ORDER — IPRATROPIUM BROMIDE AND ALBUTEROL SULFATE 2.5; .5 MG/3ML; MG/3ML
3 SOLUTION RESPIRATORY (INHALATION)
Status: COMPLETED | OUTPATIENT
Start: 2024-04-01 | End: 2024-04-01

## 2024-04-01 RX ORDER — BETAMETHASONE SODIUM PHOSPHATE AND BETAMETHASONE ACETATE 3; 3 MG/ML; MG/ML
6 INJECTION, SUSPENSION INTRA-ARTICULAR; INTRALESIONAL; INTRAMUSCULAR; SOFT TISSUE
Status: COMPLETED | OUTPATIENT
Start: 2024-04-01 | End: 2024-04-01

## 2024-04-01 RX ADMIN — BETAMETHASONE SODIUM PHOSPHATE AND BETAMETHASONE ACETATE 6 MG: 3; 3 INJECTION, SUSPENSION INTRA-ARTICULAR; INTRALESIONAL; INTRAMUSCULAR at 11:04

## 2024-04-01 RX ADMIN — IPRATROPIUM BROMIDE AND ALBUTEROL SULFATE 3 ML: 2.5; .5 SOLUTION RESPIRATORY (INHALATION) at 11:04

## 2024-04-01 NOTE — ED PROVIDER NOTES
Encounter Date: 4/1/2024       History     Chief Complaint   Patient presents with    Cough     Cough and nasal congestion x 1 week.       Patient is a 68-year-old female presenting with complain nonproductive cough x1 week.  Patient denies any shortness of breath.  Patient states she has a history of asthma.  Patient states she has a albuterol inhaler but she is almost out.  Patient denies any fever or chills or chest pain.  Patient denies abdominal pain, nausea or vomiting.  Patient is a smoker.      Review of patient's allergies indicates:  No Known Allergies  Past Medical History:   Diagnosis Date    Anxiety     Hypertension     Personal history of colonic polyps 10/01/2020    COLONOSCOPY    Sciatic nerve injury     Stroke      Past Surgical History:   Procedure Laterality Date    ADENOIDECTOMY      APPENDECTOMY      CHOLECYSTECTOMY      COLONOSCOPY  10/01/2020    Castro Holm MD    TONSILLECTOMY       No family history on file.  Social History     Tobacco Use    Smoking status: Former     Current packs/day: 0.25     Types: Cigarettes    Smokeless tobacco: Never   Substance Use Topics    Alcohol use: Yes    Drug use: Never     Review of Systems   Constitutional: Negative.    HENT:  Positive for congestion. Negative for sore throat and trouble swallowing.    Respiratory:  Positive for cough and wheezing. Negative for chest tightness and shortness of breath.    Cardiovascular: Negative.    Gastrointestinal: Negative.    Genitourinary: Negative.    Musculoskeletal: Negative.    Neurological: Negative.    Psychiatric/Behavioral: Negative.         Physical Exam     Initial Vitals [04/01/24 1050]   BP Pulse Resp Temp SpO2   119/88 100 18 96.4 °F (35.8 °C) 97 %      MAP       --         Physical Exam    Nursing note and vitals reviewed.  Constitutional: She appears well-developed and well-nourished.   Neck: Neck supple.   Normal range of motion.  Cardiovascular:  Normal rate, regular rhythm and normal heart sounds.            Pulmonary/Chest:   Patient has mild bilateral scattered wheezing with good bilateral air movement.  No rhonchi.  No rales.  No tachypnea.  No labored respirations.  No accessory muscle use.   Abdominal: Abdomen is soft. There is no abdominal tenderness. There is no guarding.   Musculoskeletal:         General: Normal range of motion.      Cervical back: Normal range of motion and neck supple.     Neurological: She is alert and oriented to person, place, and time. She has normal strength.   Psychiatric: She has a normal mood and affect. Thought content normal.         ED Course   Procedures  Labs Reviewed - No data to display       Imaging Results              X-Ray Chest AP Portable (In process)                   X-Rays:   Independently Interpreted Readings:   Other Readings:  No acute changes seen on chest x-ray    Medications   albuterol-ipratropium 2.5 mg-0.5 mg/3 mL nebulizer solution 3 mL (3 mLs Nebulization Given 4/1/24 1114)   betamethasone acetate-betamethasone sodium phosphate injection 6 mg (6 mg Intramuscular Given 4/1/24 1102)     Medical Decision Making  Differential diagnosis:  Bronchitis, asthma exacerbation, pneumonia, chronic smoker    Amount and/or Complexity of Data Reviewed  Radiology: ordered. Decision-making details documented in ED Course.  Discussion of management or test interpretation with external provider(s): X-ray shows no acute changes.  After DuoNeb, patient has decreased wheezing and cough.  Will discharge home with a prescription for albuterol MDI and Medrol Dosepak    Risk  Prescription drug management.                                      Clinical Impression:  Final diagnoses:  [J98.01] Bronchospasm, acute (Primary)          ED Disposition Condition    Discharge Stable          ED Prescriptions       Medication Sig Dispense Start Date End Date Auth. Provider    albuterol (PROVENTIL/VENTOLIN HFA) 90 mcg/actuation inhaler Inhale 1-2 puffs into the lungs every 6 (six) hours as  needed for Wheezing. Rescue 18 g 4/1/2024 -- Elder Loo MD    methylPREDNISolone (MEDROL DOSEPACK) 4 mg tablet use as directed 21 each 4/1/2024 4/22/2024 Elder Loo MD          Follow-up Information       Follow up With Specialties Details Why Contact Info    Rios Dexter MD Family Medicine In 2 days  [address] [facility] [address]  [phone]      [facility] Emergency Medicine  As needed, If symptoms worsen [address] [place] [id number] [phone]             Elder Loo MD  04/01/24 1145

## 2024-04-09 ENCOUNTER — APPOINTMENT (OUTPATIENT)
Dept: RADIOLOGY | Facility: HOSPITAL | Age: 69
End: 2024-04-09
Attending: FAMILY MEDICINE
Payer: MEDICARE

## 2024-04-09 DIAGNOSIS — M54.16 LUMBAR RADICULOPATHY, CHRONIC: ICD-10-CM

## 2024-04-09 PROCEDURE — 72148 MRI LUMBAR SPINE W/O DYE: CPT | Mod: TC

## 2024-04-10 ENCOUNTER — TELEPHONE (OUTPATIENT)
Dept: FAMILY MEDICINE | Facility: CLINIC | Age: 69
End: 2024-04-10
Payer: MEDICARE

## 2024-04-10 DIAGNOSIS — M54.16 LUMBAR RADICULOPATHY, CHRONIC: Primary | ICD-10-CM

## 2024-07-01 DIAGNOSIS — M48.062 SPINAL STENOSIS, LUMBAR REGION, WITH NEUROGENIC CLAUDICATION: Primary | ICD-10-CM

## 2024-07-03 ENCOUNTER — HOSPITAL ENCOUNTER (EMERGENCY)
Facility: HOSPITAL | Age: 69
Discharge: HOME | End: 2024-07-03
Attending: FAMILY MEDICINE
Payer: MEDICARE

## 2024-07-03 VITALS
SYSTOLIC BLOOD PRESSURE: 127 MMHG | BODY MASS INDEX: 21.98 KG/M2 | OXYGEN SATURATION: 100 % | RESPIRATION RATE: 18 BRPM | WEIGHT: 145 LBS | HEIGHT: 68 IN | DIASTOLIC BLOOD PRESSURE: 85 MMHG | HEART RATE: 96 BPM | TEMPERATURE: 97 F

## 2024-07-03 DIAGNOSIS — Z11.4 ENCOUNTER FOR SCREENING FOR HIV: ICD-10-CM

## 2024-07-03 DIAGNOSIS — G56.01 CARPAL TUNNEL SYNDROME OF RIGHT WRIST: Primary | ICD-10-CM

## 2024-07-03 DIAGNOSIS — E78.49 OTHER HYPERLIPIDEMIA: ICD-10-CM

## 2024-07-03 DIAGNOSIS — Z11.59 NEED FOR HEPATITIS C SCREENING TEST: ICD-10-CM

## 2024-07-03 DIAGNOSIS — I10 HYPERTENSION, UNSPECIFIED TYPE: Primary | ICD-10-CM

## 2024-07-03 DIAGNOSIS — Z00.00 WELLNESS EXAMINATION: ICD-10-CM

## 2024-07-03 PROCEDURE — 99283 EMERGENCY DEPT VISIT LOW MDM: CPT

## 2024-07-03 RX ORDER — NABUMETONE 500 MG/1
500 TABLET, FILM COATED ORAL 2 TIMES DAILY PRN
Qty: 15 TABLET | Refills: 0 | Status: SHIPPED | OUTPATIENT
Start: 2024-07-03

## 2024-07-03 NOTE — ED PROVIDER NOTES
Encounter Date: 7/3/2024       History     Chief Complaint   Patient presents with    Arm Injury     Pt to Ed with R hand pain radiating to upper arm, left hand/wrist pain.      Pt with right wrist and hand pain for several years.  Often tingles and feels numb on medial aspect.    The history is provided by the patient.     Review of patient's allergies indicates:  No Known Allergies  Past Medical History:   Diagnosis Date    Anxiety     Hypertension     Personal history of colonic polyps 10/01/2020    COLONOSCOPY    Sciatic nerve injury     Stroke      Past Surgical History:   Procedure Laterality Date    ADENOIDECTOMY      APPENDECTOMY      CHOLECYSTECTOMY      COLONOSCOPY  10/01/2020    Castro Holm MD    TONSILLECTOMY       No family history on file.  Social History     Tobacco Use    Smoking status: Former     Current packs/day: 0.25     Types: Cigarettes    Smokeless tobacco: Never   Substance Use Topics    Alcohol use: Yes    Drug use: Never     Review of Systems   All other systems reviewed and are negative.      Physical Exam     Initial Vitals [07/03/24 1722]   BP Pulse Resp Temp SpO2   127/85 96 18 97 °F (36.1 °C) 100 %      MAP       --         Physical Exam    Nursing note and vitals reviewed.  Constitutional: She appears well-developed and well-nourished. No distress.   Cardiovascular:  Normal rate.           Pulmonary/Chest: Breath sounds normal.   Musculoskeletal:      Comments: Right wrist: numb medial aspect, distal cap refill <3 sec, no rash     Neurological: She is alert and oriented to person, place, and time. She has normal strength. She displays normal reflexes. GCS score is 15. GCS eye subscore is 4. GCS verbal subscore is 5. GCS motor subscore is 6.   Skin: Skin is warm and dry.         ED Course   Procedures  Labs Reviewed - No data to display       Imaging Results    None          Medications - No data to display  Medical Decision Making                                    Clinical  Impression:  Final diagnoses:  [G56.01] Carpal tunnel syndrome of right wrist (Primary)          ED Disposition Condition    Discharge Stable          ED Prescriptions       Medication Sig Dispense Start Date End Date Auth. Provider    nabumetone (RELAFEN) 500 MG tablet Take 1 tablet (500 mg total) by mouth 2 (two) times daily as needed for Pain. 15 tablet 7/3/2024 -- Hebert Sanchez MD          Follow-up Information       Follow up With Specialties Details Why Contact Info    Rios Dexter MD Family Medicine Call  As needed [address]  [facility] [address]  [phone]               Hebert Sanchez MD  07/03/24 3456

## 2024-08-01 ENCOUNTER — TELEPHONE (OUTPATIENT)
Dept: FAMILY MEDICINE | Facility: CLINIC | Age: 69
End: 2024-08-01

## 2024-08-13 ENCOUNTER — OFFICE VISIT (OUTPATIENT)
Facility: CLINIC | Age: 69
End: 2024-08-13
Payer: MEDICARE

## 2024-08-13 VITALS
DIASTOLIC BLOOD PRESSURE: 96 MMHG | HEART RATE: 86 BPM | BODY MASS INDEX: 21.98 KG/M2 | SYSTOLIC BLOOD PRESSURE: 135 MMHG | HEIGHT: 68 IN | WEIGHT: 145 LBS

## 2024-08-13 DIAGNOSIS — M54.9 CHRONIC BACK PAIN GREATER THAN 3 MONTHS DURATION: Primary | ICD-10-CM

## 2024-08-13 DIAGNOSIS — M54.16 LUMBAR RADICULITIS: ICD-10-CM

## 2024-08-13 DIAGNOSIS — M48.062 SPINAL STENOSIS, LUMBAR REGION, WITH NEUROGENIC CLAUDICATION: ICD-10-CM

## 2024-08-13 DIAGNOSIS — M51.36 LUMBAR DEGENERATIVE DISC DISEASE: ICD-10-CM

## 2024-08-13 DIAGNOSIS — G89.29 CHRONIC BACK PAIN GREATER THAN 3 MONTHS DURATION: Primary | ICD-10-CM

## 2024-08-13 PROBLEM — M51.369 LUMBAR DEGENERATIVE DISC DISEASE: Status: ACTIVE | Noted: 2024-08-13

## 2024-08-13 PROCEDURE — 1159F MED LIST DOCD IN RCRD: CPT | Mod: CPTII,,, | Performed by: ANESTHESIOLOGY

## 2024-08-13 PROCEDURE — 3288F FALL RISK ASSESSMENT DOCD: CPT | Mod: CPTII,,, | Performed by: ANESTHESIOLOGY

## 2024-08-13 PROCEDURE — 1125F AMNT PAIN NOTED PAIN PRSNT: CPT | Mod: CPTII,,, | Performed by: ANESTHESIOLOGY

## 2024-08-13 PROCEDURE — 3080F DIAST BP >= 90 MM HG: CPT | Mod: CPTII,,, | Performed by: ANESTHESIOLOGY

## 2024-08-13 PROCEDURE — 99203 OFFICE O/P NEW LOW 30 MIN: CPT | Mod: ,,, | Performed by: ANESTHESIOLOGY

## 2024-08-13 PROCEDURE — 1160F RVW MEDS BY RX/DR IN RCRD: CPT | Mod: CPTII,,, | Performed by: ANESTHESIOLOGY

## 2024-08-13 PROCEDURE — 1100F PTFALLS ASSESS-DOCD GE2>/YR: CPT | Mod: CPTII,,, | Performed by: ANESTHESIOLOGY

## 2024-08-13 PROCEDURE — 3075F SYST BP GE 130 - 139MM HG: CPT | Mod: CPTII,,, | Performed by: ANESTHESIOLOGY

## 2024-08-13 PROCEDURE — 3008F BODY MASS INDEX DOCD: CPT | Mod: CPTII,,, | Performed by: ANESTHESIOLOGY

## 2024-08-13 RX ORDER — ALPRAZOLAM 1 MG/1
1 TABLET ORAL 2 TIMES DAILY PRN
COMMUNITY
Start: 2024-08-09

## 2024-08-13 RX ORDER — IPRATROPIUM BROMIDE AND ALBUTEROL SULFATE 2.5; .5 MG/3ML; MG/3ML
SOLUTION RESPIRATORY (INHALATION)
COMMUNITY
Start: 2024-04-01

## 2024-08-13 RX ORDER — TRAZODONE HYDROCHLORIDE 50 MG/1
50 TABLET ORAL NIGHTLY
COMMUNITY
Start: 2024-03-28

## 2024-08-13 RX ORDER — DESVENLAFAXINE 50 MG/1
50 TABLET, FILM COATED, EXTENDED RELEASE ORAL NIGHTLY
COMMUNITY
Start: 2024-03-28

## 2024-08-13 NOTE — PROGRESS NOTES
"   Mel Barnes MD        PATIENT NAME: Sandra Stephens  : 1955  DATE: 24  MRN: [id number]      Billing Provider: Mel Barnes MD  Level of Service:   Patient PCP Information       Provider PCP Type    Gerber Siu MD General            Reason for Visit / Chief Complaint: Low-back Pain (Referral from Dr Haley for lumbar spinal stenosis MRI in imaging C/O pain level 9,Taking Gabapentin & Meloxicam states not helping, pain started years ago, no prior injury or sx. Pain radiates into Rt leg and issa legs.)       Update PCP  Update Chief Complaint         History of Present Illness / Problem Focused Workflow     Sandra Stephens presents to the clinic with Low-back Pain (Referral from Dr Haley for lumbar spinal stenosis MRI in imaging C/O pain level 9,Taking Gabapentin & Meloxicam states not helping, pain started years ago, no prior injury or sx. Pain radiates into Rt leg and issa legs.)     This is a 68-year-old female who presents to clinic today for her initial consultation as a referral from Dr. Dominic silverman.  She complains of low back pain that has been present for many years, but it is getting worse over time.  The pain radiates down the bilateral lower extremities to her feet.  She also states that it feels like "pins and needles" in her feet.  She has undergone some injections in her back in the past that did help for a while.  Her last injection was a couple of years ago in [place].  She states it is difficult to walk because of the pain and can not even go 3 blocks to her sister's house.  She has not undergone any previous surgery on her lumbar spine.      Low-back Pain  Associated symptoms include leg pain and numbness.       Review of Systems     Review of Systems   Musculoskeletal:  Positive for back pain and leg pain.   Neurological:  Positive for numbness.   Psychiatric/Behavioral:  Positive for sleep disturbance.    All other systems reviewed and are negative.       Medical / Social / " Family History     Past Medical History:   Diagnosis Date    Anxiety     Carpal tunnel syndrome of right wrist     Hypertension     Personal history of colonic polyps 10/01/2020    COLONOSCOPY    Sciatic nerve injury     Stroke        Past Surgical History:   Procedure Laterality Date    ADENOIDECTOMY      APPENDECTOMY      CHOLECYSTECTOMY      COLONOSCOPY  10/01/2020    Castro Holm MD    TONSILLECTOMY         Social History  Ms. Stephens  reports that she has quit smoking. Her smoking use included cigarettes. She has never used smokeless tobacco. She reports current alcohol use. She reports that she does not use drugs.    Family History  Ms.'s Stephens family history is not on file.    Medications and Allergies     Medications  Outpatient Medications Marked as Taking for the 8/13/24 encounter (Office Visit) with Mel Barnes MD   Medication Sig Dispense Refill    albuterol (PROVENTIL/VENTOLIN HFA) 90 mcg/actuation inhaler Inhale 1-2 puffs into the lungs every 6 (six) hours as needed for Wheezing. Rescue 18 g 0    albuterol-ipratropium (DUO-NEB) 2.5 mg-0.5 mg/3 mL nebulizer solution       ALPRAZolam (XANAX) 1 MG tablet Take 1 mg by mouth 2 (two) times daily as needed.      amLODIPine (NORVASC) 10 MG tablet TAKE ONE TABLET BY MOUTH ONCE DAILY 30 tablet 3    atorvastatin (LIPITOR) 40 MG tablet Take 1 tablet daily 30 tablet 1    desvenlafaxine succinate (PRISTIQ) 50 MG Tb24 Take 50 mg by mouth every evening.      gabapentin (NEURONTIN) 600 MG tablet TAKE ONE TABLET BY MOUTH THREE TIMES DAILY 270 tablet 1    meloxicam (MOBIC) 7.5 MG tablet TAKE 1 TABLET BY MOUTH DAILY WITH FOOD AS NEEDED FOR PAIN 30 tablet 1    nabumetone (RELAFEN) 500 MG tablet Take 1 tablet (500 mg total) by mouth 2 (two) times daily as needed for Pain. 15 tablet 0    pantoprazole (PROTONIX) 40 MG tablet TAKE ONE TABLET BY MOUTH EVERY DAY 30 tablet 3    traZODone (DESYREL) 50 MG tablet Take 50 mg by mouth every evening.      [DISCONTINUED] DULoxetine  (CYMBALTA) 60 MG capsule TAKE ONE CAPSULE BY MOUTH DAILY 30 capsule 1    [DISCONTINUED] hydrOXYzine pamoate (VISTARIL) 25 MG Cap TAKE ONE CAPSULE BY MOUTH TWICE DAILY AS NEEDED FOR ANXIETY 60 capsule 1    [DISCONTINUED] nabumetone (RELAFEN) 500 MG tablet Take 1 tablet (500 mg total) by mouth 2 (two) times daily. 60 tablet 1       Allergies  Review of patient's allergies indicates:  No Known Allergies    Physical Examination     Vitals:    08/13/24 0917   BP: (!) 135/96   Pulse: 86     Pain Disability Index (PDI): 65       Spine Musculoskeletal Exam    Gait    Gait is normal.    Inspection    Thoracolumbar    Thoracolumbar inspection is normal.    Palpation    Thoracolumbar    Tenderness: present      Paraspinous: right and left    Right      Masses: none      Spasms: none      Crepitus: none      Muscle tone: normal    Left      Masses: none      Spasms: none      Crepitus: none      Muscle tone: normal    Range of Motion    Thoracolumbar        Thoracolumbar range of motion additional comments: Limited range of motion in lumbar spine due to pain.    Strength    Thoracolumbar    Thoracolumbar motor exam is normal.       Sensory    Thoracolumbar    Thoracolumbar sensation is normal.    General      Constitutional: appears stated age, well-developed and well-nourished    Scleral icterus: no    Labored breathing: no    Psychiatric: normal mood and affect and no acute distress    Neurological: alert and oriented x3    Skin: intact    Lymphadenopathy: none  CV: extremities warm, well-perfused  Resp: nonlabored breathing       Assessment and Plan (including Health Maintenance)      Problem List  Smart Sets  Document Outside HM   :    Plan:   Chronic back pain greater than 3 months duration    Spinal stenosis, lumbar region, with neurogenic claudication  -     Ambulatory referral/consult to Pain Clinic    Lumbar degenerative disc disease    Lumbar radiculitis      She is being scheduled for left L4 and right L5  transforaminal epidural steroid injections today to help with her chronic low back pain radiating down the bilateral lower extremities to her feet, consistent with findings of degenerative disc disease and nerve impingement seen on her MRI.  The plan was discussed with the patient and she wishes to proceed.        Problem List Items Addressed This Visit          Neuro    Lumbar degenerative disc disease    Lumbar radiculitis       Orthopedic    Chronic back pain greater than 3 months duration - Primary     Other Visit Diagnoses       Spinal stenosis, lumbar region, with neurogenic claudication                  No future appointments.     There are no Patient Instructions on file for this visit.  No follow-ups on file.     Signature:  Mle Barnes MD      Date of encounter: 8/13/24

## 2024-08-23 ENCOUNTER — TELEPHONE (OUTPATIENT)
Facility: CLINIC | Age: 69
End: 2024-08-23
Payer: MEDICARE

## 2024-11-01 ENCOUNTER — HOSPITAL ENCOUNTER (EMERGENCY)
Facility: HOSPITAL | Age: 69
Discharge: HOME | End: 2024-11-01
Attending: STUDENT IN AN ORGANIZED HEALTH CARE EDUCATION/TRAINING PROGRAM
Payer: MEDICARE

## 2024-11-01 VITALS
BODY MASS INDEX: 22.66 KG/M2 | OXYGEN SATURATION: 100 % | HEART RATE: 67 BPM | RESPIRATION RATE: 16 BRPM | TEMPERATURE: 98 F | WEIGHT: 149 LBS | DIASTOLIC BLOOD PRESSURE: 88 MMHG | SYSTOLIC BLOOD PRESSURE: 135 MMHG

## 2024-11-01 DIAGNOSIS — R42 LIGHTHEADED: ICD-10-CM

## 2024-11-01 DIAGNOSIS — F19.90 DRUG USE: Primary | ICD-10-CM

## 2024-11-01 LAB
ALBUMIN SERPL-MCNC: 4 G/DL (ref 3.4–4.8)
ALBUMIN/GLOB SERPL: 1 RATIO (ref 1.1–2)
ALP SERPL-CCNC: 74 UNIT/L (ref 40–150)
ALT SERPL-CCNC: 90 UNIT/L (ref 0–55)
AMPHET UR QL SCN: NEGATIVE
ANION GAP SERPL CALC-SCNC: 11 MEQ/L
AST SERPL-CCNC: 91 UNIT/L (ref 5–34)
BACTERIA #/AREA URNS AUTO: ABNORMAL /HPF
BARBITURATE SCN PRESENT UR: NEGATIVE
BASOPHILS # BLD AUTO: 0.02 X10(3)/MCL
BASOPHILS NFR BLD AUTO: 0.5 %
BENZODIAZ UR QL SCN: NEGATIVE
BILIRUB SERPL-MCNC: 0.6 MG/DL
BILIRUB UR QL STRIP.AUTO: ABNORMAL
BUN SERPL-MCNC: 16.3 MG/DL (ref 9.8–20.1)
CALCIUM SERPL-MCNC: 11.1 MG/DL (ref 8.4–10.2)
CANNABINOIDS UR QL SCN: POSITIVE
CHLORIDE SERPL-SCNC: 102 MMOL/L (ref 98–107)
CLARITY UR: CLEAR
CO2 SERPL-SCNC: 25 MMOL/L (ref 23–31)
COCAINE UR QL SCN: POSITIVE
COLOR UR AUTO: YELLOW
CREAT SERPL-MCNC: 1.04 MG/DL (ref 0.55–1.02)
CREAT/UREA NIT SERPL: 16
EOSINOPHIL # BLD AUTO: 0.04 X10(3)/MCL (ref 0–0.9)
EOSINOPHIL NFR BLD AUTO: 0.9 %
ERYTHROCYTE [DISTWIDTH] IN BLOOD BY AUTOMATED COUNT: 12.5 % (ref 11.5–17)
FENTANYL UR QL SCN: NEGATIVE
GFR SERPLBLD CREATININE-BSD FMLA CKD-EPI: 59 ML/MIN/1.73/M2
GLOBULIN SER-MCNC: 4.2 GM/DL (ref 2.4–3.5)
GLUCOSE SERPL-MCNC: 130 MG/DL (ref 82–115)
GLUCOSE UR QL STRIP: NORMAL
HCT VFR BLD AUTO: 41.9 % (ref 37–47)
HGB BLD-MCNC: 13.8 G/DL (ref 12–16)
HGB UR QL STRIP: NEGATIVE
HYALINE CASTS #/AREA URNS LPF: >20 /LPF
IMM GRANULOCYTES # BLD AUTO: 0.01 X10(3)/MCL (ref 0–0.04)
IMM GRANULOCYTES NFR BLD AUTO: 0.2 %
KETONES UR QL STRIP: NEGATIVE
LEUKOCYTE ESTERASE UR QL STRIP: NEGATIVE
LYMPHOCYTES # BLD AUTO: 1.44 X10(3)/MCL (ref 0.6–4.6)
LYMPHOCYTES NFR BLD AUTO: 32.7 %
MCH RBC QN AUTO: 31.8 PG (ref 27–31)
MCHC RBC AUTO-ENTMCNC: 32.9 G/DL (ref 33–36)
MCV RBC AUTO: 96.5 FL (ref 80–94)
MDMA UR QL SCN: NEGATIVE
MONOCYTES # BLD AUTO: 0.51 X10(3)/MCL (ref 0.1–1.3)
MONOCYTES NFR BLD AUTO: 11.6 %
MUCOUS THREADS URNS QL MICRO: ABNORMAL /LPF
NEUTROPHILS # BLD AUTO: 2.38 X10(3)/MCL (ref 2.1–9.2)
NEUTROPHILS NFR BLD AUTO: 54.1 %
NITRITE UR QL STRIP: NEGATIVE
NRBC BLD AUTO-RTO: 0 %
OHS QRS DURATION: 78 MS
OHS QTC CALCULATION: 456 MS
OPIATES UR QL SCN: NEGATIVE
PCP UR QL: NEGATIVE
PH UR STRIP: 5.5 [PH]
PH UR: 5.5 [PH] (ref 3–11)
PLATELET # BLD AUTO: 110 X10(3)/MCL (ref 130–400)
PMV BLD AUTO: 12.3 FL (ref 7.4–10.4)
POTASSIUM SERPL-SCNC: 5.6 MMOL/L (ref 3.5–5.1)
PROT SERPL-MCNC: 8.2 GM/DL (ref 5.8–7.6)
PROT UR QL STRIP: ABNORMAL
RBC # BLD AUTO: 4.34 X10(6)/MCL (ref 4.2–5.4)
RBC #/AREA URNS AUTO: ABNORMAL /HPF
SODIUM SERPL-SCNC: 138 MMOL/L (ref 136–145)
SP GR UR STRIP.AUTO: 1.03 (ref 1–1.03)
SPECIFIC GRAVITY, URINE AUTO (.000) (OHS): 1.03 (ref 1–1.03)
SQUAMOUS #/AREA URNS LPF: ABNORMAL /HPF
TROPONIN I SERPL-MCNC: <0.01 NG/ML (ref 0–0.04)
UROBILINOGEN UR STRIP-ACNC: 8
WBC # BLD AUTO: 4.4 X10(3)/MCL (ref 4.5–11.5)
WBC #/AREA URNS AUTO: ABNORMAL /HPF

## 2024-11-01 PROCEDURE — 80307 DRUG TEST PRSMV CHEM ANLYZR: CPT | Performed by: NURSE PRACTITIONER

## 2024-11-01 PROCEDURE — 81001 URINALYSIS AUTO W/SCOPE: CPT | Performed by: NURSE PRACTITIONER

## 2024-11-01 PROCEDURE — 80053 COMPREHEN METABOLIC PANEL: CPT | Performed by: NURSE PRACTITIONER

## 2024-11-01 PROCEDURE — 85025 COMPLETE CBC W/AUTO DIFF WBC: CPT | Performed by: NURSE PRACTITIONER

## 2024-11-01 PROCEDURE — 93010 ELECTROCARDIOGRAM REPORT: CPT | Mod: ,,, | Performed by: INTERNAL MEDICINE

## 2024-11-01 PROCEDURE — 84484 ASSAY OF TROPONIN QUANT: CPT | Performed by: NURSE PRACTITIONER

## 2024-11-01 PROCEDURE — 99285 EMERGENCY DEPT VISIT HI MDM: CPT | Mod: 25

## 2024-11-01 PROCEDURE — 93005 ELECTROCARDIOGRAM TRACING: CPT

## 2024-11-01 RX ORDER — NALOXONE HYDROCHLORIDE 4 MG/.1ML
1 SPRAY NASAL ONCE
Qty: 1 EACH | Refills: 0 | Status: SHIPPED | OUTPATIENT
Start: 2024-11-01 | End: 2024-11-01

## 2024-11-01 NOTE — ED PROVIDER NOTES
Encounter Date: 11/1/2024       History     Chief Complaint   Patient presents with    Loss of Consciousness     Pt to ER via [facility] for syncope after smoking marijuana.  Did fall yesterday and has some back pain.  Denies any other complaints.     See MDM    The history is provided by the patient. No  was used.     Review of patient's allergies indicates:  No Known Allergies  Past Medical History:   Diagnosis Date    Anxiety     Carpal tunnel syndrome of right wrist     Hypertension     Personal history of colonic polyps 10/01/2020    COLONOSCOPY    Sciatic nerve injury     Stroke      Past Surgical History:   Procedure Laterality Date    ADENOIDECTOMY      APPENDECTOMY      CHOLECYSTECTOMY      COLONOSCOPY  10/01/2020    Castro Holm MD    TONSILLECTOMY       No family history on file.  Social History     Tobacco Use    Smoking status: Former     Current packs/day: 0.25     Types: Cigarettes    Smokeless tobacco: Never   Substance Use Topics    Alcohol use: Yes    Drug use: Never     Review of Systems   Constitutional:  Negative for fever.   Respiratory:  Negative for cough and shortness of breath.    Cardiovascular:  Negative for chest pain.   Gastrointestinal:  Negative for abdominal pain.   Genitourinary:  Negative for difficulty urinating and dysuria.   Musculoskeletal:  Negative for gait problem.   Skin:  Negative for color change.   Neurological:  Positive for dizziness and light-headedness. Negative for speech difficulty and headaches.   Psychiatric/Behavioral:  Negative for hallucinations and suicidal ideas.    All other systems reviewed and are negative.      Physical Exam     Initial Vitals [11/01/24 1648]   BP Pulse Resp Temp SpO2   124/72 80 16 98.4 °F (36.9 °C) 98 %      MAP       --         Physical Exam    Nursing note and vitals reviewed.  Constitutional: She appears well-developed and well-nourished.   HENT:   Head: Normocephalic.   Eyes: EOM are normal.   Neck:   Normal range of  motion.  Cardiovascular:  Normal rate, regular rhythm, normal heart sounds and intact distal pulses.           Pulmonary/Chest: Breath sounds normal. No respiratory distress.   Abdominal: Abdomen is soft. Bowel sounds are normal. There is no abdominal tenderness.   Musculoskeletal:         General: Normal range of motion.      Cervical back: Normal range of motion.     Neurological: She is alert and oriented to person, place, and time. She has normal strength.   Skin: Skin is warm and dry.   Psychiatric: She has a normal mood and affect. Her behavior is normal. Judgment and thought content normal.         ED Course   Procedures  Labs Reviewed   COMPREHENSIVE METABOLIC PANEL - Abnormal       Result Value    Sodium 138      Potassium 5.6 (*)     Chloride 102      CO2 25      Glucose 130 (*)     Blood Urea Nitrogen 16.3      Creatinine 1.04 (*)     Calcium 11.1 (*)     Protein Total 8.2 (*)     Albumin 4.0      Globulin 4.2 (*)     Albumin/Globulin Ratio 1.0 (*)     Bilirubin Total 0.6      ALP 74      ALT 90 (*)     AST 91 (*)     eGFR 59      Anion Gap 11.0      BUN/Creatinine Ratio 16     DRUG SCREEN, URINE (BEAKER) - Abnormal    Amphetamines, Urine Negative      Barbiturates, Urine Negative      Benzodiazepine, Urine Negative      Cannabinoids, Urine Positive (*)     Cocaine, Urine Positive (*)     Fentanyl, Urine Negative      MDMA, Urine Negative      Opiates, Urine Negative      Phencyclidine, Urine Negative      pH, Urine 5.5      Specific Gravity, Urine Auto 1.032      Narrative:     Cut off concentrations:    Amphetamines - 1000 ng/ml  Barbiturates - 200 ng/ml  Benzodiazepine - 200 ng/ml  Cannabinoids (THC) - 50 ng/ml  Cocaine - 300 ng/ml  Fentanyl - 1.0 ng/ml  MDMA - 500 ng/ml  Opiates - 300 ng/ml   Phencyclidine (PCP) - 25 ng/ml    Specimen submitted for drug analysis and tested for pH and specific gravity in order to evaluate sample integrity. Suspect tampering if specific gravity is <1.003 and/or pH is  not within the range of 4.5 - 8.0  False negatives may result form substances such as bleach added to urine.  False positives may result for the presence of a substance with similar chemical structure to the drug or its metabolite.    This test provides only a PRELIMINARY analytical test result. A more specific alternate chemical method must be used in order to obtain a confirmed analytical result. Gas chromatography/mass spectrometry (GC/MS) is the preferred confirmatory method. Other chemical confirmation methods are available. Clinical consideration and professional judgement should be applied to any drug of abuse test result, particularly when preliminary positive results are used.    Positive results will be confirmed only at the physicians request. Unconfirmed screening results are to be used only for medical purposes (treatment).        URINALYSIS, REFLEX TO URINE CULTURE - Abnormal    Color, UA Yellow      Appearance, UA Clear      Specific Gravity, UA 1.032 (*)     pH, UA 5.5      Protein, UA 1+ (*)     Glucose, UA Normal      Ketones, UA Negative      Blood, UA Negative      Bilirubin, UA 1+ (*)     Urobilinogen, UA 8.0 (*)     Nitrites, UA Negative      Leukocyte Esterase, UA Negative      RBC, UA 0-5      WBC, UA 0-5      Bacteria, UA Trace      Squamous Epithelial Cells, UA Trace      Mucous, UA Moderate (*)     Hyaline Casts, UA >20 (*)    CBC WITH DIFFERENTIAL - Abnormal    WBC 4.40 (*)     RBC 4.34      Hgb 13.8      Hct 41.9      MCV 96.5 (*)     MCH 31.8 (*)     MCHC 32.9 (*)     RDW 12.5      Platelet 110 (*)     MPV 12.3 (*)     Neut % 54.1      Lymph % 32.7      Mono % 11.6      Eos % 0.9      Basophil % 0.5      Lymph # 1.44      Neut # 2.38      Mono # 0.51      Eos # 0.04      Baso # 0.02      IG# 0.01      IG% 0.2      NRBC% 0.0     TROPONIN I - Normal    Troponin-I <0.010     CBC W/ AUTO DIFFERENTIAL    Narrative:     The following orders were created for panel order CBC auto  differential.  Procedure                               Abnormality         Status                     ---------                               -----------         ------                     CBC with Differential[[id number]]       Abnormal            Final result                 Please view results for these tests on the individual orders.     EKG Readings: (Independently Interpreted)   Rhythm: Normal Sinus Rhythm. Heart Rate: 76. Ectopy: No Ectopy. Conduction: Normal. ST Segments: Normal ST Segments. T Waves: Normal. Axis: Normal. Clinical Impression: Normal Sinus Rhythm     ECG Results              EKG 12-lead (Final result)        Collection Time Result Time QRS Duration OHS QTC Calculation    11/01/24 17:03:41 11/01/24 19:55:11 78 456                     Final result by Interface, Lab [facility] (11/01/24 19:55:19)                   Narrative:    Test Reason : R42,    Vent. Rate : 076 BPM     Atrial Rate : 076 BPM     P-R Int : 186 ms          QRS Dur : 078 ms      QT Int : 406 ms       P-R-T Axes : 061 053 050 degrees     QTc Int : 456 ms    Normal sinus rhythm  Possible Left atrial enlargement  Minimal voltage criteria for LVH, may be normal variant ( Sokolow-Son )  Abnormal ECG  Confirmed by Jeremiah Bonds MD ([id number]) on 11/1/2024 7:55:09 PM    Referred By: [email or web address]   SELF           Confirmed By:Jeremiah Bonds MD                                  Imaging Results              CT Head Without Contrast (Final result)  Result time 11/01/24 19:50:21      Final result by Shiv Bermudez MD (11/01/24 19:50:21)                   Narrative:    EXAMINATION  CT HEAD WITHOUT CONTRAST    CLINICAL HISTORY  hit head on concrete;    TECHNIQUE  Axial non-contrast CT images of the head were acquired and multiplanar reconstructions accomplished by a CT technologist at a separate workstation, pushed to PACS for physician review.    COMPARISON  25 April 2023    FINDINGS  Images were reviewed in subdural, brain, soft tissue, and bone  windows.    Exam quality: Motion/streak artifact limits assessment of the posterior fossa.    Hemorrhage: No evidence of acute hyperattenuating blood products.    Parenchyma: There is diffuse bilateral supratentorial white matter hypoattenuation, typical of chronic microvascular changes. No discrete mass, mass effect, or CT evidence of acute large vascular territory insult. Gray-white differentiation is preserved.    Midline shift: None.    CSF spaces: Proportional appearance of ventricular and sulcal enlargement. No hydrocephalus. No masses or fluid collections.    Vasculature: No focally hyperdense artery. Scattered carotid siphon calcifications are present. No abnormal densities within the dural sinuses.    Other findings: No abnormalities of the scalp or subjacent osseous structures. Mastoids are well aerated. No focal abnormality of the sella. The included facial structures are unremarkable.    IMPRESSION  1. No convincing acute intracranial abnormality.  2. Additional secondary details discussed above, relatively unchanged from the comparison CT appearance.    RADIATION DOSE  Automated tube current modulation, weight-based exposure dosing, and/or iterative reconstruction technique utilized to reach lowest reasonably achievable exposure rate.    DLP: 933 mGy*cm      Electronically signed by: Shiv Bermudez  Date:    11/01/2024  Time:    19:50                                     X-Ray Toe 2 or More Views Left (Final result)  Result time 11/01/24 19:38:46      Final result by Shiv Bermudez MD (11/01/24 19:38:46)                   Narrative:    EXAMINATION  XR TOE 2 OR MORE VIEWS LEFT    CLINICAL HISTORY  left great toe injury;    TECHNIQUE  A total of 3 images of the left toe(s).    COMPARISON  None available at the time of initial interpretation.    FINDINGS  No displaced fracture or dislocation is identified. The visualized joint spaces are preserved. No aggressive osseous lesion or periosteal reaction is  evident.    The included soft tissues are without acute abnormality.    IMPRESSION  No convincing acute radiographic abnormality.      Electronically signed by: Shiv Bermudez  Date:    11/01/2024  Time:    19:38                                     X-Ray Chest PA And Lateral (Final result)  Result time 11/01/24 17:47:31      Final result by Bozena Ortiz MD (11/01/24 17:47:31)                   Impression:      No acute abnormality of the chest.      Electronically signed by: Bozena Ortiz  Date:    11/01/2024  Time:    17:47               Narrative:    EXAMINATION:  XR CHEST PA AND LATERAL    CLINICAL HISTORY:  Dizziness and giddiness    TECHNIQUE:  PA and lateral chest radiographs    COMPARISON:  Chest x-ray dated 04/01/2024    FINDINGS:  There is normal in size.  The lungs are clear.  There is no pleural effusion or visible pneumothorax.                                       X-Ray Lumbar Spine Ap And Lateral (Final result)  Result time 11/01/24 17:47:06      Final result by Bozena Ortiz MD (11/01/24 17:47:06)                   Impression:      1. No acute abnormality identified.  2. Multilevel degenerative changes of the lumbar spine.      Electronically signed by: Bozena Ortiz  Date:    11/01/2024  Time:    17:47               Narrative:    EXAMINATION:  XR LUMBAR SPINE AP AND LATERAL    CLINICAL HISTORY:  fall;    COMPARISON:  X-rays dated 04/21/2022    FINDINGS:  There are 5 non-rib-bearing lumbar type vertebral bodies.  There is grade 1 anterolisthesis of L4 over L5.  There is disc height loss at L5-S1 with marginal osteophytes.  The vertebral body heights and disc spaces are otherwise preserved.  There is mild facet arthropathy.  The soft tissues are unremarkable.                                       Medications - No data to display  Medical Decision Making  Historian:  Patient.  Patient is a Black or  69 y.o. female that presents with lightheaded and dizziness after  smoking marijuana that has been present today. Associated symptoms fall yesterday with lower back pain. Surrounding information is nothing. Exacerbated by nothing. Relieved by nothing. Patient treatment prior to arrival none. Risk factors include none. Other history pertaining to this complaint nothing.   Assessment:  See physical exam.  DD:drug intoxication, lumbar strain, lumbar sprain, lumbar fracture, ACS   ED Course: History was obtained.  Physical was performed.  Patient appears to have done some illicit drugs and became lightheaded.  Her workup was initially negative.  We will have her follow up outpatient with the PCP. She did strike her head on concrete yesterday. Do to mechanism and age, CT head is indicated. Medical or surgical consults:  None. Social determinants that affect healthcare:  None.       Amount and/or Complexity of Data Reviewed  Labs: ordered.     Details: Drug screen showed cocaine and cannabis  Radiology: ordered.     Details: X-ray showed no acute findings    Risk  Prescription drug management.                                      Clinical Impression:  Final diagnoses:  [R42] Lightheaded  [F19.90] Drug use (Primary)          ED Disposition Condition    Discharge Stable          ED Prescriptions       Medication Sig Dispense Start Date End Date Auth. Provider    naloxone (NARCAN) 4 mg/actuation Spry (Expires today) 1 spray (4 mg total) by Nasal route once. for 1 dose 1 each 11/1/2024 11/1/2024 Tomas Valdes FNP          Follow-up Information       Follow up With Specialties Details Why Contact Info    Your Primary Care Provider  Call in 3 days ed follow up              Tomas Valdes FNP  11/01/24 1900       Tomas Valdes FNP  11/01/24 2001

## 2025-05-21 ENCOUNTER — LAB VISIT (OUTPATIENT)
Dept: LAB | Facility: HOSPITAL | Age: 70
End: 2025-05-21
Attending: NEUROLOGICAL SURGERY
Payer: MEDICARE

## 2025-05-21 DIAGNOSIS — D78.01 INTRAOPERATIVE HEMORRHAGE AND HEMATOMA OF SPLEEN COMPLICATING PROCEDURE ON SPLEEN: Primary | ICD-10-CM

## 2025-05-21 DIAGNOSIS — M54.12 BRACHIAL NEURITIS: ICD-10-CM

## 2025-05-21 LAB
ANION GAP SERPL CALC-SCNC: 8 MEQ/L
APTT PPP: 28.4 SECONDS (ref 24.5–32.8)
BUN SERPL-MCNC: 13 MG/DL (ref 9.8–20.1)
CALCIUM SERPL-MCNC: 10.1 MG/DL (ref 8.4–10.2)
CHLORIDE SERPL-SCNC: 108 MMOL/L (ref 98–107)
CO2 SERPL-SCNC: 28 MMOL/L (ref 23–31)
CREAT SERPL-MCNC: 0.7 MG/DL (ref 0.55–1.02)
CREAT/UREA NIT SERPL: 19
GFR SERPLBLD CREATININE-BSD FMLA CKD-EPI: >60 ML/MIN/1.73/M2
GLUCOSE SERPL-MCNC: 104 MG/DL (ref 82–115)
HCT VFR BLD AUTO: 40.5 % (ref 37–47)
HGB BLD-MCNC: 12.7 G/DL (ref 12–16)
INR PPP: 1
PLATELET # BLD AUTO: 146 X10(3)/MCL (ref 130–400)
POTASSIUM SERPL-SCNC: 4.5 MMOL/L (ref 3.5–5.1)
PROTHROMBIN TIME: 10.4 SECONDS (ref 9.3–11.4)
SODIUM SERPL-SCNC: 144 MMOL/L (ref 136–145)

## 2025-05-21 PROCEDURE — 85014 HEMATOCRIT: CPT

## 2025-05-21 PROCEDURE — 85610 PROTHROMBIN TIME: CPT

## 2025-05-21 PROCEDURE — 85730 THROMBOPLASTIN TIME PARTIAL: CPT

## 2025-05-21 PROCEDURE — 85049 AUTOMATED PLATELET COUNT: CPT

## 2025-05-21 PROCEDURE — 36415 COLL VENOUS BLD VENIPUNCTURE: CPT

## 2025-05-21 PROCEDURE — 80048 BASIC METABOLIC PNL TOTAL CA: CPT

## 2025-05-27 ENCOUNTER — ANESTHESIA EVENT (OUTPATIENT)
Dept: SURGERY | Facility: HOSPITAL | Age: 70
End: 2025-05-27
Payer: MEDICARE

## 2025-05-28 RX ORDER — CYCLOBENZAPRINE HCL 5 MG
2.5 TABLET ORAL 2 TIMES DAILY PRN
COMMUNITY
Start: 2025-05-08

## 2025-06-04 ENCOUNTER — HOSPITAL ENCOUNTER (INPATIENT)
Facility: HOSPITAL | Age: 70
LOS: 5 days | Discharge: HOME HEALTH | DRG: 472 | End: 2025-06-09
Attending: NEUROLOGICAL SURGERY | Admitting: NEUROLOGICAL SURGERY
Payer: MEDICARE

## 2025-06-04 ENCOUNTER — ANESTHESIA (OUTPATIENT)
Dept: SURGERY | Facility: HOSPITAL | Age: 70
End: 2025-06-04
Payer: MEDICARE

## 2025-06-04 DIAGNOSIS — M48.02 SPINAL STENOSIS IN CERVICAL REGION: Primary | ICD-10-CM

## 2025-06-04 LAB
GROUP & RH: NORMAL
INDIRECT COOMBS: NORMAL
SPECIMEN OUTDATE: NORMAL

## 2025-06-04 PROCEDURE — 0RG2071 FUSION OF 2 OR MORE CERVICAL VERTEBRAL JOINTS WITH AUTOLOGOUS TISSUE SUBSTITUTE, POSTERIOR APPROACH, POSTERIOR COLUMN, OPEN APPROACH: ICD-10-PCS | Performed by: NEUROLOGICAL SURGERY

## 2025-06-04 PROCEDURE — C1713 ANCHOR/SCREW BN/BN,TIS/BN: HCPCS | Performed by: NEUROLOGICAL SURGERY

## 2025-06-04 PROCEDURE — 36000713 HC OR TIME LEV V EA ADD 15 MIN: Performed by: NEUROLOGICAL SURGERY

## 2025-06-04 PROCEDURE — 27201423 OPTIME MED/SURG SUP & DEVICES STERILE SUPPLY: Performed by: NEUROLOGICAL SURGERY

## 2025-06-04 PROCEDURE — 37000008 HC ANESTHESIA 1ST 15 MINUTES: Performed by: NEUROLOGICAL SURGERY

## 2025-06-04 PROCEDURE — 71000039 HC RECOVERY, EACH ADD'L HOUR: Performed by: NEUROLOGICAL SURGERY

## 2025-06-04 PROCEDURE — 63600175 PHARM REV CODE 636 W HCPCS: Performed by: NEUROLOGICAL SURGERY

## 2025-06-04 PROCEDURE — 25000003 PHARM REV CODE 250

## 2025-06-04 PROCEDURE — 25000003 PHARM REV CODE 250: Performed by: NEUROLOGICAL SURGERY

## 2025-06-04 PROCEDURE — 37000009 HC ANESTHESIA EA ADD 15 MINS: Performed by: NEUROLOGICAL SURGERY

## 2025-06-04 PROCEDURE — 63600175 PHARM REV CODE 636 W HCPCS

## 2025-06-04 PROCEDURE — 00NW0ZZ RELEASE CERVICAL SPINAL CORD, OPEN APPROACH: ICD-10-PCS | Performed by: NEUROLOGICAL SURGERY

## 2025-06-04 PROCEDURE — 63600175 PHARM REV CODE 636 W HCPCS: Performed by: STUDENT IN AN ORGANIZED HEALTH CARE EDUCATION/TRAINING PROGRAM

## 2025-06-04 PROCEDURE — D9220A PRA ANESTHESIA: Mod: CRNA,,, | Performed by: STUDENT IN AN ORGANIZED HEALTH CARE EDUCATION/TRAINING PROGRAM

## 2025-06-04 PROCEDURE — 11000001 HC ACUTE MED/SURG PRIVATE ROOM

## 2025-06-04 PROCEDURE — 97166 OT EVAL MOD COMPLEX 45 MIN: CPT

## 2025-06-04 PROCEDURE — 27800903 OPTIME MED/SURG SUP & DEVICES OTHER IMPLANTS: Performed by: NEUROLOGICAL SURGERY

## 2025-06-04 PROCEDURE — 36620 INSERTION CATHETER ARTERY: CPT | Mod: 59,,, | Performed by: ANESTHESIOLOGY

## 2025-06-04 PROCEDURE — 86901 BLOOD TYPING SEROLOGIC RH(D): CPT | Performed by: NEUROLOGICAL SURGERY

## 2025-06-04 PROCEDURE — 0RG4071 FUSION OF CERVICOTHORACIC VERTEBRAL JOINT WITH AUTOLOGOUS TISSUE SUBSTITUTE, POSTERIOR APPROACH, POSTERIOR COLUMN, OPEN APPROACH: ICD-10-PCS | Performed by: NEUROLOGICAL SURGERY

## 2025-06-04 PROCEDURE — 25000003 PHARM REV CODE 250: Performed by: STUDENT IN AN ORGANIZED HEALTH CARE EDUCATION/TRAINING PROGRAM

## 2025-06-04 PROCEDURE — D9220A PRA ANESTHESIA: Mod: ANES,,, | Performed by: ANESTHESIOLOGY

## 2025-06-04 PROCEDURE — 36000712 HC OR TIME LEV V 1ST 15 MIN: Performed by: NEUROLOGICAL SURGERY

## 2025-06-04 PROCEDURE — 71000033 HC RECOVERY, INTIAL HOUR: Performed by: NEUROLOGICAL SURGERY

## 2025-06-04 PROCEDURE — 97162 PT EVAL MOD COMPLEX 30 MIN: CPT

## 2025-06-04 PROCEDURE — 97530 THERAPEUTIC ACTIVITIES: CPT

## 2025-06-04 DEVICE — FILLER BONE SYN 1CC PARTIC: Type: IMPLANTABLE DEVICE | Site: POSTERIOR CERVICAL | Status: FUNCTIONAL

## 2025-06-04 DEVICE — GRAFT BONE OSTEOAMP SELECT 5CC: Type: IMPLANTABLE DEVICE | Site: POSTERIOR CERVICAL | Status: FUNCTIONAL

## 2025-06-04 DEVICE — IMPLANTABLE DEVICE: Type: IMPLANTABLE DEVICE | Site: POSTERIOR CERVICAL | Status: FUNCTIONAL

## 2025-06-04 RX ORDER — MORPHINE SULFATE 4 MG/ML
2 INJECTION, SOLUTION INTRAMUSCULAR; INTRAVENOUS
Refills: 0 | Status: DISCONTINUED | OUTPATIENT
Start: 2025-06-04 | End: 2025-06-09 | Stop reason: HOSPADM

## 2025-06-04 RX ORDER — HALOPERIDOL LACTATE 5 MG/ML
0.5 INJECTION, SOLUTION INTRAMUSCULAR EVERY 10 MIN PRN
Status: DISCONTINUED | OUTPATIENT
Start: 2025-06-04 | End: 2025-06-04 | Stop reason: HOSPADM

## 2025-06-04 RX ORDER — ENOXAPARIN SODIUM 100 MG/ML
40 INJECTION SUBCUTANEOUS EVERY 24 HOURS
Status: DISCONTINUED | OUTPATIENT
Start: 2025-06-06 | End: 2025-06-09 | Stop reason: HOSPADM

## 2025-06-04 RX ORDER — REMIFENTANIL HYDROCHLORIDE 1 MG/ML
INJECTION, POWDER, LYOPHILIZED, FOR SOLUTION INTRAVENOUS CONTINUOUS PRN
Status: DISCONTINUED | OUTPATIENT
Start: 2025-06-04 | End: 2025-06-04

## 2025-06-04 RX ORDER — FENTANYL CITRATE 50 UG/ML
INJECTION, SOLUTION INTRAMUSCULAR; INTRAVENOUS
Status: DISCONTINUED | OUTPATIENT
Start: 2025-06-04 | End: 2025-06-04

## 2025-06-04 RX ORDER — CYCLOBENZAPRINE HCL 10 MG
10 TABLET ORAL 3 TIMES DAILY PRN
Status: DISCONTINUED | OUTPATIENT
Start: 2025-06-04 | End: 2025-06-09 | Stop reason: HOSPADM

## 2025-06-04 RX ORDER — ONDANSETRON HYDROCHLORIDE 2 MG/ML
INJECTION, SOLUTION INTRAVENOUS
Status: DISCONTINUED | OUTPATIENT
Start: 2025-06-04 | End: 2025-06-04

## 2025-06-04 RX ORDER — ACETAMINOPHEN 325 MG/1
650 TABLET ORAL EVERY 4 HOURS PRN
Status: DISCONTINUED | OUTPATIENT
Start: 2025-06-04 | End: 2025-06-09 | Stop reason: HOSPADM

## 2025-06-04 RX ORDER — LIDOCAINE HYDROCHLORIDE AND EPINEPHRINE 5; 5 MG/ML; UG/ML
INJECTION, SOLUTION INFILTRATION; PERINEURAL
Status: DISCONTINUED | OUTPATIENT
Start: 2025-06-04 | End: 2025-06-04 | Stop reason: HOSPADM

## 2025-06-04 RX ORDER — CALCIUM CARBONATE 200(500)MG
500 TABLET,CHEWABLE ORAL DAILY PRN
Status: DISCONTINUED | OUTPATIENT
Start: 2025-06-04 | End: 2025-06-09 | Stop reason: HOSPADM

## 2025-06-04 RX ORDER — ACETAMINOPHEN 10 MG/ML
INJECTION, SOLUTION INTRAVENOUS
Status: DISCONTINUED | OUTPATIENT
Start: 2025-06-04 | End: 2025-06-04

## 2025-06-04 RX ORDER — HYDROMORPHONE HYDROCHLORIDE 2 MG/ML
0.2 INJECTION, SOLUTION INTRAMUSCULAR; INTRAVENOUS; SUBCUTANEOUS EVERY 5 MIN PRN
Status: DISCONTINUED | OUTPATIENT
Start: 2025-06-04 | End: 2025-06-04 | Stop reason: HOSPADM

## 2025-06-04 RX ORDER — MORPHINE SULFATE 4 MG/ML
1 INJECTION, SOLUTION INTRAMUSCULAR; INTRAVENOUS
Refills: 0 | Status: DISCONTINUED | OUTPATIENT
Start: 2025-06-04 | End: 2025-06-09 | Stop reason: HOSPADM

## 2025-06-04 RX ORDER — PROPOFOL 10 MG/ML
VIAL (ML) INTRAVENOUS
Status: DISCONTINUED | OUTPATIENT
Start: 2025-06-04 | End: 2025-06-04

## 2025-06-04 RX ORDER — GLUCAGON 1 MG
1 KIT INJECTION
Status: DISCONTINUED | OUTPATIENT
Start: 2025-06-04 | End: 2025-06-04 | Stop reason: HOSPADM

## 2025-06-04 RX ORDER — KETAMINE HYDROCHLORIDE 100 MG/ML
INJECTION, SOLUTION INTRAMUSCULAR; INTRAVENOUS
Status: DISCONTINUED | OUTPATIENT
Start: 2025-06-04 | End: 2025-06-04

## 2025-06-04 RX ORDER — MORPHINE SULFATE 4 MG/ML
4 INJECTION, SOLUTION INTRAMUSCULAR; INTRAVENOUS
Refills: 0 | Status: DISCONTINUED | OUTPATIENT
Start: 2025-06-04 | End: 2025-06-09 | Stop reason: HOSPADM

## 2025-06-04 RX ORDER — MIDAZOLAM HYDROCHLORIDE 1 MG/ML
INJECTION INTRAMUSCULAR; INTRAVENOUS
Status: DISCONTINUED | OUTPATIENT
Start: 2025-06-04 | End: 2025-06-04

## 2025-06-04 RX ORDER — ALPRAZOLAM 0.5 MG/1
1 TABLET ORAL 2 TIMES DAILY PRN
Status: DISCONTINUED | OUTPATIENT
Start: 2025-06-04 | End: 2025-06-09 | Stop reason: HOSPADM

## 2025-06-04 RX ORDER — ROCURONIUM BROMIDE 10 MG/ML
INJECTION, SOLUTION INTRAVENOUS
Status: DISCONTINUED | OUTPATIENT
Start: 2025-06-04 | End: 2025-06-04

## 2025-06-04 RX ORDER — BISACODYL 10 MG/1
10 SUPPOSITORY RECTAL DAILY
Status: DISCONTINUED | OUTPATIENT
Start: 2025-06-04 | End: 2025-06-09 | Stop reason: HOSPADM

## 2025-06-04 RX ORDER — OXYCODONE AND ACETAMINOPHEN 10; 325 MG/1; MG/1
1 TABLET ORAL EVERY 4 HOURS PRN
Refills: 0 | Status: DISCONTINUED | OUTPATIENT
Start: 2025-06-04 | End: 2025-06-04

## 2025-06-04 RX ORDER — ALUMINUM HYDROXIDE, MAGNESIUM HYDROXIDE, AND SIMETHICONE 1200; 120; 1200 MG/30ML; MG/30ML; MG/30ML
30 SUSPENSION ORAL
Status: DISCONTINUED | OUTPATIENT
Start: 2025-06-04 | End: 2025-06-05

## 2025-06-04 RX ORDER — HYDROCODONE BITARTRATE AND ACETAMINOPHEN 10; 325 MG/1; MG/1
1 TABLET ORAL EVERY 4 HOURS PRN
Refills: 0 | Status: DISCONTINUED | OUTPATIENT
Start: 2025-06-04 | End: 2025-06-09 | Stop reason: HOSPADM

## 2025-06-04 RX ORDER — PHENYLEPHRINE HCL IN 0.9% NACL 1 MG/10 ML
SYRINGE (ML) INTRAVENOUS
Status: DISCONTINUED | OUTPATIENT
Start: 2025-06-04 | End: 2025-06-04

## 2025-06-04 RX ORDER — OXYCODONE AND ACETAMINOPHEN 5; 325 MG/1; MG/1
1 TABLET ORAL
Status: DISCONTINUED | OUTPATIENT
Start: 2025-06-04 | End: 2025-06-04 | Stop reason: HOSPADM

## 2025-06-04 RX ORDER — CEFAZOLIN SODIUM 2 G/50ML
2 SOLUTION INTRAVENOUS
Status: DISCONTINUED | OUTPATIENT
Start: 2025-06-04 | End: 2025-06-04 | Stop reason: HOSPADM

## 2025-06-04 RX ORDER — LIDOCAINE HYDROCHLORIDE 20 MG/ML
INJECTION INTRAVENOUS
Status: DISCONTINUED | OUTPATIENT
Start: 2025-06-04 | End: 2025-06-04

## 2025-06-04 RX ORDER — ATORVASTATIN CALCIUM 40 MG/1
40 TABLET, FILM COATED ORAL NIGHTLY
Status: DISCONTINUED | OUTPATIENT
Start: 2025-06-04 | End: 2025-06-09 | Stop reason: HOSPADM

## 2025-06-04 RX ORDER — KETOROLAC TROMETHAMINE 30 MG/ML
15 INJECTION, SOLUTION INTRAMUSCULAR; INTRAVENOUS EVERY 8 HOURS PRN
Status: DISCONTINUED | OUTPATIENT
Start: 2025-06-04 | End: 2025-06-04 | Stop reason: HOSPADM

## 2025-06-04 RX ORDER — IPRATROPIUM BROMIDE AND ALBUTEROL SULFATE 2.5; .5 MG/3ML; MG/3ML
3 SOLUTION RESPIRATORY (INHALATION) EVERY 4 HOURS PRN
Status: DISCONTINUED | OUTPATIENT
Start: 2025-06-04 | End: 2025-06-09 | Stop reason: HOSPADM

## 2025-06-04 RX ORDER — GLYCOPYRROLATE 0.2 MG/ML
INJECTION INTRAMUSCULAR; INTRAVENOUS
Status: DISCONTINUED | OUTPATIENT
Start: 2025-06-04 | End: 2025-06-04

## 2025-06-04 RX ORDER — HYDROMORPHONE HYDROCHLORIDE 2 MG/ML
INJECTION, SOLUTION INTRAMUSCULAR; INTRAVENOUS; SUBCUTANEOUS
Status: DISCONTINUED | OUTPATIENT
Start: 2025-06-04 | End: 2025-06-04

## 2025-06-04 RX ORDER — SUCCINYLCHOLINE CHLORIDE 20 MG/ML
INJECTION INTRAMUSCULAR; INTRAVENOUS
Status: DISCONTINUED | OUTPATIENT
Start: 2025-06-04 | End: 2025-06-04

## 2025-06-04 RX ORDER — HYDROCODONE BITARTRATE AND ACETAMINOPHEN 5; 325 MG/1; MG/1
1 TABLET ORAL EVERY 4 HOURS PRN
Refills: 0 | Status: DISCONTINUED | OUTPATIENT
Start: 2025-06-04 | End: 2025-06-09 | Stop reason: HOSPADM

## 2025-06-04 RX ORDER — PROCHLORPERAZINE EDISYLATE 5 MG/ML
10 INJECTION INTRAMUSCULAR; INTRAVENOUS EVERY 6 HOURS PRN
Status: DISCONTINUED | OUTPATIENT
Start: 2025-06-04 | End: 2025-06-09 | Stop reason: HOSPADM

## 2025-06-04 RX ORDER — CEFAZOLIN SODIUM 1 G/3ML
INJECTION, POWDER, FOR SOLUTION INTRAMUSCULAR; INTRAVENOUS
Status: DISCONTINUED | OUTPATIENT
Start: 2025-06-04 | End: 2025-06-04 | Stop reason: HOSPADM

## 2025-06-04 RX ORDER — CEFAZOLIN 2 G/1
2 INJECTION, POWDER, FOR SOLUTION INTRAMUSCULAR; INTRAVENOUS
Status: COMPLETED | OUTPATIENT
Start: 2025-06-04 | End: 2025-06-05

## 2025-06-04 RX ORDER — AMLODIPINE BESYLATE 5 MG/1
10 TABLET ORAL DAILY
Status: DISCONTINUED | OUTPATIENT
Start: 2025-06-05 | End: 2025-06-09 | Stop reason: HOSPADM

## 2025-06-04 RX ORDER — MUPIROCIN 20 MG/G
OINTMENT TOPICAL 2 TIMES DAILY
Status: DISPENSED | OUTPATIENT
Start: 2025-06-04 | End: 2025-06-09

## 2025-06-04 RX ORDER — ALUMINUM HYDROXIDE, MAGNESIUM HYDROXIDE, AND SIMETHICONE 1200; 120; 1200 MG/30ML; MG/30ML; MG/30ML
30 SUSPENSION ORAL EVERY 4 HOURS PRN
Status: DISCONTINUED | OUTPATIENT
Start: 2025-06-04 | End: 2025-06-09 | Stop reason: HOSPADM

## 2025-06-04 RX ORDER — HYDRALAZINE HYDROCHLORIDE 20 MG/ML
10 INJECTION INTRAMUSCULAR; INTRAVENOUS ONCE
Status: DISCONTINUED | OUTPATIENT
Start: 2025-06-04 | End: 2025-06-04 | Stop reason: HOSPADM

## 2025-06-04 RX ORDER — ALBUTEROL SULFATE 90 UG/1
1 INHALANT RESPIRATORY (INHALATION) EVERY 6 HOURS PRN
Status: DISCONTINUED | OUTPATIENT
Start: 2025-06-04 | End: 2025-06-04

## 2025-06-04 RX ORDER — AMOXICILLIN 250 MG
2 CAPSULE ORAL 2 TIMES DAILY
Status: DISCONTINUED | OUTPATIENT
Start: 2025-06-04 | End: 2025-06-09 | Stop reason: HOSPADM

## 2025-06-04 RX ORDER — ADHESIVE BANDAGE
30 BANDAGE TOPICAL DAILY PRN
Status: DISCONTINUED | OUTPATIENT
Start: 2025-06-04 | End: 2025-06-09 | Stop reason: HOSPADM

## 2025-06-04 RX ORDER — ONDANSETRON 4 MG/1
8 TABLET, ORALLY DISINTEGRATING ORAL EVERY 6 HOURS PRN
Status: DISCONTINUED | OUTPATIENT
Start: 2025-06-04 | End: 2025-06-09 | Stop reason: HOSPADM

## 2025-06-04 RX ORDER — ACETAMINOPHEN 325 MG/1
650 TABLET ORAL EVERY 6 HOURS PRN
Status: DISCONTINUED | OUTPATIENT
Start: 2025-06-04 | End: 2025-06-09 | Stop reason: HOSPADM

## 2025-06-04 RX ORDER — BACITRACIN 500 [USP'U]/G
OINTMENT TOPICAL
Status: DISCONTINUED | OUTPATIENT
Start: 2025-06-04 | End: 2025-06-04 | Stop reason: HOSPADM

## 2025-06-04 RX ORDER — SODIUM CHLORIDE 9 MG/ML
INJECTION, SOLUTION INTRAVENOUS CONTINUOUS
Status: DISCONTINUED | OUTPATIENT
Start: 2025-06-04 | End: 2025-06-07

## 2025-06-04 RX ORDER — DEXAMETHASONE SODIUM PHOSPHATE 4 MG/ML
INJECTION, SOLUTION INTRA-ARTICULAR; INTRALESIONAL; INTRAMUSCULAR; INTRAVENOUS; SOFT TISSUE
Status: DISCONTINUED | OUTPATIENT
Start: 2025-06-04 | End: 2025-06-04

## 2025-06-04 RX ADMIN — HYDROMORPHONE HYDROCHLORIDE 0.4 MG: 2 INJECTION, SOLUTION INTRAMUSCULAR; INTRAVENOUS; SUBCUTANEOUS at 09:06

## 2025-06-04 RX ADMIN — MIDAZOLAM HYDROCHLORIDE 2 MG: 1 INJECTION, SOLUTION INTRAMUSCULAR; INTRAVENOUS at 07:06

## 2025-06-04 RX ADMIN — MORPHINE SULFATE 4 MG: 4 INJECTION INTRAVENOUS at 07:06

## 2025-06-04 RX ADMIN — CYCLOBENZAPRINE 10 MG: 10 TABLET, FILM COATED ORAL at 09:06

## 2025-06-04 RX ADMIN — PROPOFOL 100 MCG/KG/MIN: 10 INJECTION, EMULSION INTRAVENOUS at 07:06

## 2025-06-04 RX ADMIN — DEXAMETHASONE SODIUM PHOSPHATE 8 MG: 4 INJECTION, SOLUTION INTRA-ARTICULAR; INTRALESIONAL; INTRAMUSCULAR; INTRAVENOUS; SOFT TISSUE at 08:06

## 2025-06-04 RX ADMIN — ONDANSETRON 4 MG: 2 INJECTION INTRAMUSCULAR; INTRAVENOUS at 09:06

## 2025-06-04 RX ADMIN — LIDOCAINE HYDROCHLORIDE 80 MG: 20 INJECTION INTRAVENOUS at 07:06

## 2025-06-04 RX ADMIN — REMIFENTANIL HYDROCHLORIDE 0.12 MCG/KG/MIN: 1 INJECTION, POWDER, LYOPHILIZED, FOR SOLUTION INTRAVENOUS at 07:06

## 2025-06-04 RX ADMIN — SENNOSIDES AND DOCUSATE SODIUM 2 TABLET: 50; 8.6 TABLET ORAL at 09:06

## 2025-06-04 RX ADMIN — ATORVASTATIN CALCIUM 40 MG: 40 TABLET, FILM COATED ORAL at 09:06

## 2025-06-04 RX ADMIN — ACETAMINOPHEN 1000 MG: 10 INJECTION, SOLUTION INTRAVENOUS at 09:06

## 2025-06-04 RX ADMIN — FENTANYL CITRATE 50 MCG: 50 INJECTION, SOLUTION INTRAMUSCULAR; INTRAVENOUS at 08:06

## 2025-06-04 RX ADMIN — KETAMINE HYDROCHLORIDE 20 MG: 100 INJECTION, SOLUTION, CONCENTRATE INTRAMUSCULAR; INTRAVENOUS at 07:06

## 2025-06-04 RX ADMIN — CEFAZOLIN 2 G: 2 INJECTION, POWDER, FOR SOLUTION INTRAMUSCULAR; INTRAVENOUS at 05:06

## 2025-06-04 RX ADMIN — HYDROCODONE BITARTRATE AND ACETAMINOPHEN 1 TABLET: 10; 325 TABLET ORAL at 01:06

## 2025-06-04 RX ADMIN — HYDROMORPHONE HYDROCHLORIDE 0.8 MG: 2 INJECTION, SOLUTION INTRAMUSCULAR; INTRAVENOUS; SUBCUTANEOUS at 09:06

## 2025-06-04 RX ADMIN — KETAMINE HYDROCHLORIDE 15 MG: 100 INJECTION, SOLUTION, CONCENTRATE INTRAMUSCULAR; INTRAVENOUS at 08:06

## 2025-06-04 RX ADMIN — MUPIROCIN: 20 OINTMENT TOPICAL at 09:06

## 2025-06-04 RX ADMIN — PHENYLEPHRINE HYDROCHLORIDE 25 MCG/MIN: 10 INJECTION INTRAVENOUS at 07:06

## 2025-06-04 RX ADMIN — ALUMINUM HYDROXIDE, MAGNESIUM HYDROXIDE, AND DIMETHICONE 30 ML: 200; 20; 200 SUSPENSION ORAL at 05:06

## 2025-06-04 RX ADMIN — Medication 50 MCG: at 09:06

## 2025-06-04 RX ADMIN — SODIUM CHLORIDE, SODIUM GLUCONATE, SODIUM ACETATE, POTASSIUM CHLORIDE AND MAGNESIUM CHLORIDE: 526; 502; 368; 37; 30 INJECTION, SOLUTION INTRAVENOUS at 07:06

## 2025-06-04 RX ADMIN — SUCCINYLCHOLINE CHLORIDE 120 MG: 20 INJECTION, SOLUTION INTRAMUSCULAR; INTRAVENOUS at 07:06

## 2025-06-04 RX ADMIN — KETAMINE HYDROCHLORIDE 15 MG: 100 INJECTION, SOLUTION, CONCENTRATE INTRAMUSCULAR; INTRAVENOUS at 09:06

## 2025-06-04 RX ADMIN — GLYCOPYRROLATE 0.2 MG: 0.2 INJECTION INTRAMUSCULAR; INTRAVENOUS at 07:06

## 2025-06-04 RX ADMIN — HYDROCODONE BITARTRATE AND ACETAMINOPHEN 1 TABLET: 10; 325 TABLET ORAL at 05:06

## 2025-06-04 RX ADMIN — CEFAZOLIN 2 G: 2 INJECTION, POWDER, FOR SOLUTION INTRAMUSCULAR; INTRAVENOUS at 08:06

## 2025-06-04 RX ADMIN — FENTANYL CITRATE 50 MCG: 50 INJECTION, SOLUTION INTRAMUSCULAR; INTRAVENOUS at 07:06

## 2025-06-04 RX ADMIN — PROPOFOL 110 MG: 10 INJECTION, EMULSION INTRAVENOUS at 07:06

## 2025-06-04 RX ADMIN — ROCURONIUM BROMIDE 5 MG: 10 SOLUTION INTRAVENOUS at 07:06

## 2025-06-04 RX ADMIN — MORPHINE SULFATE 1 MG: 4 INJECTION, SOLUTION INTRAMUSCULAR; INTRAVENOUS at 11:06

## 2025-06-04 NOTE — TRANSFER OF CARE
"Anesthesia Transfer of Care Note    Patient: Sandra Stephens    Procedure(s) Performed: Procedure(s) (LRB):  ARTHRODESIS, SPINE, POSTERIOR SPINAL COLUMN, CERVICAL, W/ COMPUTER-ASSISTED NAVIGATION (N/A)    Patient location: PACU    Anesthesia Type: general    Transport from OR: Transported from OR on room air with adequate spontaneous ventilation    Post pain: adequate analgesia    Post assessment: no apparent anesthetic complications    Post vital signs: stable    Level of consciousness: sedated    Nausea/Vomiting: no nausea/vomiting    Complications: none    Transfer of care protocol was followed      Last vitals: Visit Vitals  BP (!) 151/92   Pulse 74   Temp 36.5 °C (97.7 °F) (Oral)   Resp 17   Ht 5' 8" (1.727 m)   Wt 68.1 kg (150 lb 2.1 oz)   SpO2 100%   Breastfeeding No   BMI 22.83 kg/m²     "

## 2025-06-04 NOTE — ANESTHESIA PROCEDURE NOTES
Arterial    Diagnosis: Cervical Stenosis    Patient location during procedure: done in OR  Timeout: 6/4/2025 7:33 AM  Procedure end time: 6/4/2025 7:38 AM    Staffing  Authorizing Provider: Hung Trevino MD  Performing Provider: Hung Trevino MD    Staffing  Performed by: Hung Trevino MD  Authorized by: Hung Trevino MD    Anesthesiologist was present at the time of the procedure.    Preanesthetic Checklist  Completed: patient identified, IV checked, site marked, risks and benefits discussed, surgical consent, monitors and equipment checked, pre-op evaluation, timeout performed and anesthesia consent givenArterial  Skin Prep: chlorhexidine gluconate  Local Infiltration: none  Orientation: left  Location: radial    Catheter Size: 20 G  Catheter placement by Anatomical landmarks. Heme positive aspiration all ports. Insertion Attempts: 2  Assessment  Dressing: secured with tape and tegaderm  Exercise tolerance: Under GETA.

## 2025-06-04 NOTE — PLAN OF CARE
Problem: Physical Therapy  Goal: Physical Therapy Goal  Description: Goals to be met by: 2025     Patient will increase functional independence with mobility by performin. Supine to sit with Stand-by Assistance  2. Sit to supine with Stand-by Assistance  3. Sit to stand transfer with Modified [place]  4. Bed to chair transfer with Modified [place] using Rolling Walker  5. Gait  x 150 feet with Stand-by Assistance using Rolling Walker.     Outcome: Progressing

## 2025-06-04 NOTE — PT/OT/SLP EVAL
Occupational Therapy  Evaluation    Name: Sandra Stephens  MRN: [id number]  Recent Surgery: Procedure(s) (LRB):  ARTHRODESIS, SPINE, POSTERIOR SPINAL COLUMN, CERVICAL, W/ COMPUTER-ASSISTED NAVIGATION (N/A) * Day of Surgery *    Recommendations:     Discharge therapy intensity: Low Intensity Therapy   Discharge Equipment Recommendations:  shower chair  Barriers to discharge:  Other (Comment) (ongoing medical needs)    Assessment:     Sandra Stephens is a 69 y.o. female with a medical diagnosis of multilevel stenosis s/p C3-T1 PCDF. She is A&Ox4 and tolerated OT evaluation well. She reports living alone and being independent with ADLs prior. Daughter present at bedside and reports pt will be staying with her upon d/c; able to provide 24/7 assist. She presents with the following performance deficits affecting function: weakness, impaired endurance, impaired self care skills, impaired functional mobility, gait instability, impaired balance, decreased upper extremity function, decreased lower extremity function. She required mod A for bed mobility and min A for sit<>stand using rolling walker. Recommend low intensity therapy upon d/c.     Rehab Prognosis: Good; patient would benefit from acute skilled OT services to address these deficits and reach maximum level of function.       Plan:     Patient to be seen 5 x/week to address the above listed problems via self-care/home management, therapeutic activities, therapeutic exercises  Plan of Care Expires: 07/04/25  Plan of Care Reviewed with: patient, family    Subjective     Chief Complaint: neck pain   Patient/Family Comments/goals: to get better     Occupational Profile:  Living Environment: Pt lives alone. Pt will be staying with her daughter upon d/c. She has a single level home with no steps to enter. She has a walk-in shower with no SC.   Previous level of function: Pt reports being independent with ADLs prior.   Roles and Routines: mother  Equipment Used at Home: walker,  rolling  Assistance upon Discharge: Pt will have assist from her daughters upon d/c.     Pain/Comfort:  Pain Rating 1: other (see comments) (verbalized pain but did not rate)  Location 1: neck  Pain Addressed 1: Reposition, Nurse notified, Distraction    Patients cultural, spiritual, Hoahaoism conflicts given the current situation: no    Objective:     OT communicated with RN prior to session.      Patient was found HOB elevated with blood pressure cuff, pulse ox (continuous), peripheral IV, sweeney catheter, KANDIS drain upon OT entry to room.    General Precautions: Standard, fall  Orthopedic Precautions: spinal precautions  Braces:  (soft collar)    Vital Signs: Blood Pressure: 136/86 semi-supine, 125/85 sitting EOB, 147/88 post  HR: 84-88  Respiratory Status: on room air      Functional Mobility/Transfers:  Bed mobility:    Supine to Sit: moderate assistance  Sit to Supine: moderate assistance  Transfers: Sit to Stand: minimum assistance with rolling walker  Functional Mobility: pt able to take lateral and fwd/bwd steps with min A using rolling walker.     Activities of Daily Living:  Lower Body Dressing: maximal assistance to don socks.     AMPAC 6 Click ADL:  AMPAC Total Score: 20    Functional Cognition:  Orientation: oriented to Person, Place, Time, and Situation    Visual Perceptual Skills:  Intact    Upper Extremity Function:  Right Upper Extremity:   Grossly 3/5    Left Upper Extremity:  Grossly 3/5    Balance:   Overall CGA-min A    Therapeutic Positioning  Risk for acquired pressure injuries is increased due to relative decrease in mobility d/t hospitalization  and impaired mobility.    OT interventions performed during the course of today's session:   Positioning recommendations were communicated to care team     Skin assessment: all bony prominences were assessed    Findings: Visible skin intact.     OT recommendations for therapeutic positioning throughout hospitalization:   Follow [facility] Pressure Injury  Prevention Protocol    Patient Education:  Patient and family were provided with verbal education education regarding OT role/goals/POC, post op precautions, fall prevention, safety awareness, Discharge/DME recommendations, and pressure ulcer prevention.  Understanding was verbalized.     Patient left HOB elevated with all lines intact, call button in reach, and RN notified.    GOALS:   Multidisciplinary Problems       Occupational Therapy Goals          Problem: Occupational Therapy    Goal Priority Disciplines Outcome Interventions   Occupational Therapy Goal     OT, PT/OT Progressing    Description: LTG: Pt will perform basic ADLs and ADL transfers with Modified independence using LRAD by discharge.    STG: to be met by 7/4/25    Pt will complete grooming standing at sink with LRAD with SBA.  Pt will complete UB dressing with SBA.  Pt will complete LB dressing with SBA using LRAD and AE prn.  Pt will complete toileting with SBA using LRAD.  Pt will complete functional mobility to/from toilet and toilet transfer with SBA using LRAD.                        History:     Past Medical History:   Diagnosis Date    Anxiety     Carpal tunnel syndrome of right wrist     Hypertension     Personal history of colonic polyps 10/01/2020    COLONOSCOPY    Sciatic nerve injury     Spinal stenosis in cervical region     Stroke          Past Surgical History:   Procedure Laterality Date    ADENOIDECTOMY      APPENDECTOMY      CHOLECYSTECTOMY      COLONOSCOPY  10/01/2020    Castor Holm MD    HYSTERECTOMY      TONSILLECTOMY         Time Tracking:     OT Date of Treatment: 06/04/25  OT Start Time: 1417  OT Stop Time: 1441  OT Total Time (min): 24 min    Billable Minutes:Evaluation Moderate Complexity.     6/4/2025

## 2025-06-04 NOTE — PLAN OF CARE
06/04/25 1410   Discharge Assessment   Assessment Type Discharge Planning Assessment   Confirmed/corrected address, phone number and insurance Yes   Confirmed Demographics Correct on Facesheet   Source of Information patient;family   If unable to respond/provide information was family/caregiver contacted? Yes   Contact Name/Number lisseth charles yesenia, [phone]   Communicated RACHAEL with patient/caregiver Date not available/Unable to determine   People in Home alone   Do you expect to return to your current living situation? No   Do you have help at home or someone to help you manage your care at home? Yes   Who are your caregiver(s) and their phone number(s)? lisseth charles yesenia, [phone]   Prior to hospitilization cognitive status: Unable to Assess   Current cognitive status: Alert/Oriented   Home Accessibility stairs to enter home   Number of Stairs, Main Entrance four   Stair Railings, Main Entrance none   Home Layout Able to live on 1st floor   Equipment Currently Used at Home walker, rolling   Readmission within 30 days? No   Patient currently being followed by outpatient case management? No   Do you currently have service(s) that help you manage your care at home? No   Do you take prescription medications? Yes   Do you have prescription coverage? Yes   Do you have any problems affording any of your prescribed medications? No   Is the patient taking medications as prescribed? yes   Who is going to help you get home at discharge? family   How do you get to doctors appointments? family or friend will provide   Are you on dialysis? No   Do you take coumadin? No   Discharge Plan A Home with family;Home Health   Discharge Plan B Other  (tbd)   DME Needed Upon Discharge  none   Discharge Plan discussed with: Patient;Adult children   Transition of Care Barriers None     Completed assessment with pt and dtr at bedside. Introduced self and explained role as SW. Both verb understanding to all questions asked. PCP is  Gerber Siu. D/c dispo pending post-op therapy recs.    Amanda Curtis LCSW

## 2025-06-04 NOTE — OP NOTE
[facility]                       [address] [place] [id number]    PATIENT NAME:      CINDY BERGMAN   YOB: 1955  CSN:               [id number]  MRN:               [id number]  ADMIT DATE:        06/04/2025 05:12:00  PHYSICIAN:         Linda Haley MD                          OPERATIVE REPORT      DATE OF SURGERY:    06/04/2025 00:00:00    SURGEON:  Linda Haley MD    ASSISTANT:  Hao Valentin.    PREOPERATIVE DIAGNOSES:    1. Severe stenosis multilevel from C3-T1.  2. Neck pain.  3. Arm pain.  4. Weakness.    POSTOPERATIVE DIAGNOSIS:    1. Severe stenosis multilevel from C3-T1.  2. Neck pain.  3. Arm pain.  4. Weakness.    PROCEDURES:  Posterior cervical decompression; removal of pressure of spinal   cord at C3, C4, C5, C6, C7, T1; placement of lateral screws on the right side at   C3, C4, C5, C6, and T1 and on the left side at C3, C4, C6 and T1;   posterolateral fusion OsteoAMP DBM and the patient's bone.  Microscope was used.    Monitoring was used.  O-arm was used.    INDICATIONS FOR SURGERY:  The patient is a 69-year-old with severe narrowing and   stenosis from C3-T1, here for decompression and fusion.  The consent and risks   were discussed.  Risks of bleeding, infection, weakness, probability of CSF   leak, reoperation, hemorrhage discussed in detail.  Again, they want to proceed   with surgery.  We spent some time while all the risks and benefits were   discussed, how it is done, why.  She came to the office multiple times.    Obtained consent to proceed with surgery.    OPERATIVE PROCEDURE:  Brought to the operating room, intubated.  Mancera   placement.  Monitor attached.  Head put in pins.  Turned prone.  Back and neck   were prepped and draped.  Incision was made in midline exposing the levels.    Once the levels were exposed, we put a clamp on 2 and did a spin and put screws   on the right side at C3, C4,  C5, C6, and pedicle screw at T1, on the left side   at C3, C4, C6 and pedicle screw T1.  Once the screws were in, we checked.  Once   the screws were in good position, we then decompressed the spinal cord going   from 3, 4, 5, 6, 7, T1 using rongeur, drill and 1 and 2 mm Kerrison punches.    The pressure was taken off the spinal cord.  We also took the pressure off the   nerve root right and left side.  Complete foraminotomy was done.  Complete   decompression was done.  We then obtained hemostasis.  Multiple times irrigated   wound, putting FloSeal and Avitene and once that was done, make sure it was nice   and dried.  Good decompression was done.  Bars and caps were put and final   tightening was done.  The sides were decorticated and packed with OsteoAMP DBM   and the patient's on bone.  A drain was left in place, secured and wound was   closed in multiple layers of 0 Vicryl, 3-0 Vicryl, running subcu.  There was no   change in monitoring.  Final x-rays were good.        ______________________________  MD ADAM Duran/KIYA  DD:  06/04/2025  Time:  09:39AM  DT:  06/04/2025  Time:  01:25PM  Job #:  [id number]      OPERATIVE REPORT

## 2025-06-04 NOTE — PT/OT/SLP EVAL
Physical Therapy Evaluation    Patient Name:  Sandra Stephens   MRN:  [id number]    Recommendations:     Discharge therapy intensity: Low Intensity Therapy   Discharge Equipment Recommendations: shower chair   Barriers to discharge: Impaired mobility and Ongoing medical needs    Assessment:     Sandra Stephens is a 69 y.o. female admitted with a medical diagnosis of  multilevel stenosis s/p C3-T1 PCDF.  She presents with the following impairments/functional limitations: weakness, impaired endurance, impaired self care skills, gait instability, impaired functional mobility, pain, orthopedic precautions. Pt req Mod A for bed mobility and Min A for transfers and pre-gait activities. Increased assistance req for bed mobility due to neck pain and for pt to maintain precautions. Pt lives alone and was independent prior to admission (did not use any ADs for ambulation). Pt plans to go stay with her daughter upon discharge who can provide 24/7 assistance. Recommending low intensity therapy at this time.          Rehab Prognosis: Good; patient would benefit from acute skilled PT services to address these deficits and reach maximum level of function.    Recent Surgery: Procedure(s) (LRB):  ARTHRODESIS, SPINE, POSTERIOR SPINAL COLUMN, CERVICAL, W/ COMPUTER-ASSISTED NAVIGATION (N/A) * Day of Surgery *    Plan:     During this hospitalization, patient would benefit from acute PT services 6 x/week to address the identified rehab impairments via gait training, therapeutic activities, therapeutic exercises and progress toward the following goals:    Plan of Care Expires:  07/04/25    Subjective     Chief Complaint: neck pain  Patient/Family Comments/goals: return home  Pain/Comfort:   Cervical pain post procedure.     Patients cultural, spiritual, Mormon conflicts given the current situation: no    Living Environment:  Pt lives alone. Pt will be staying with her daughter upon discharge. Daughter lives in [facility] [address] with no DAISY.   Prior to  admission, patients level of function was Independent.  Equipment used at home: none.  DME owned (not currently used): rolling walker.  Upon discharge, patient will have assistance from daughter.    Objective:     Communicated with nsg prior to session.  Patient found HOB elevated with pulse ox (continuous), telemetry, blood pressure cuff, KANDIS drain, sweeney catheter  upon PT entry to room.    General Precautions: Standard, fall  Orthopedic Precautions:spinal precautions   Braces: Cervical collar  Respiratory Status: Room air  Blood Pressure:   136/86 semi-supine  125/85 sitting EOB  147/88 post    Exams:  Cognitive Exam:  Patient is oriented to Person, Place, Time, and Situation  RLE ROM: WFL  RLE Strength: WFL  LLE ROM: WFL  LLE Strength: WFL  Skin integrity: Visible skin intact      Functional Mobility:  Bed Mobility:     Supine to Sit: moderate assistance  Sit to Supine: moderate assistance  Transfers:     Sit to Stand:  minimum assistance with rolling walker  Pre-Gait : Min A with RW  Pt completed 2 lateral side steps towards HOB   Pt ambulated 3ft fwd + 3ft backward demo slow pace and step through pattern; no LOB. Mild c/o dizziness after short bout of activity.     AM-PAC 6 CLICK MOBILITY  Total Score:15       Treatment & Education:      Patient and daughter/s were provided with verbal education education regarding PT role/goals/POC, post-op precautions, fall prevention, safety awareness, and discharge/DME recommendations.  Understanding was verbalized, however additional teaching warranted.     Patient left HOB elevated with all lines intact, call button in reach, and daughter present.    GOALS:   Multidisciplinary Problems       Physical Therapy Goals          Problem: Physical Therapy    Goal Priority Disciplines Outcome Interventions   Physical Therapy Goal     PT, PT/OT Progressing    Description: Goals to be met by: 7/4/2025     Patient will increase functional independence with mobility by  performin. Supine to sit with Stand-by Assistance  2. Sit to supine with Stand-by Assistance  3. Sit to stand transfer with Modified [place]  4. Bed to chair transfer with Modified [place] using Rolling Walker  5. Gait  x 150 feet with Stand-by Assistance using Rolling Walker.                          History:     Past Medical History:   Diagnosis Date    Anxiety     Carpal tunnel syndrome of right wrist     Hypertension     Personal history of colonic polyps 10/01/2020    COLONOSCOPY    Sciatic nerve injury     Spinal stenosis in cervical region     Stroke        Past Surgical History:   Procedure Laterality Date    ADENOIDECTOMY      APPENDECTOMY      CHOLECYSTECTOMY      COLONOSCOPY  10/01/2020    Castro Holm MD    HYSTERECTOMY      TONSILLECTOMY         Time Tracking:     PT Received On: 25  PT Start Time: 1416     PT Stop Time: 1441  PT Total Time (min): 25 min     Billable Minutes: Evaluation 15 and Therapeutic Activity 10      2025

## 2025-06-04 NOTE — ANESTHESIA PREPROCEDURE EVALUATION
"                                                                                                             06/04/2025  Sandra Stephens is a 69 y.o., female with severe cervical stenosis C3 to C7 with cord compression. Plan today is to do a posterior decompression and fusion. She has always done well with anesthesia, and maintains a relatively high ectivity level. She had a "ministroke" in the last five years, but her only permanent neurological issue is bilateral numbness in the hands, which is probably more related to the cervical disease.     Height: 5' 8" (1.727 m) (05/28/25)   Weight: 68.1 kg (150 lb 2.1 oz) (06/04/25)   BMI: 22.83   NPO Status: 1930   Allergies: No Known Allergies     Pre Vitals  Current as of 06/04/25 0657  BP: 151/92 Pulse: 74   Resp: 17 SpO2: 100   Temp: 36.5 °C (97.7 °F)     Past Medical History   Anxiety Hypertension   Sciatic nerve injury Personal history of colonic polyps   Stroke Carpal tunnel syndrome of right wrist   Spinal stenosis in cervical region      Surgical History    APPENDECTOMY CHOLECYSTECTOMY   TONSILLECTOMY ADENOIDECTOMY   COLONOSCOPY HYSTERECTOMY     Prescription Medications  Within last 14 days from 06/04/25   Last Taken Last Updated   albuterol (PROVENTIL/VENTOLIN HFA) 90 mcg/actuation inhaler    Unknown 06/04/25 0550   albuterol-ipratropium (DUO-NEB) 2.5 mg-0.5 mg/3 mL nebulizer solution    Unknown 06/04/25 0550   ALPRAZolam (XANAX) 1 MG tablet    Unknown 06/04/25 0550   amLODIPine (NORVASC) 10 MG tablet    6/4/2025 at Morning 06/04/25 0550   aspirin (ECOTRIN) 81 MG EC tablet    5/28/2025 05/28/25 1341   atorvastatin (LIPITOR) 40 MG tablet    6/2/2025 06/04/25 0531   cyclobenzaprine (FLEXERIL) 5 MG tablet    6/2/2025 06/04/25 0531   desvenlafaxine succinate (PRISTIQ) 50 MG Tb24    Taking 08/13/24 0925   gabapentin (NEURONTIN) 600 MG tablet    Taking 08/13/24 0922   meloxicam (MOBIC) 7.5 MG tablet    Taking 08/13/24 0922   nabumetone (RELAFEN) 500 MG tablet    Taking " 08/13/24 0922   pantoprazole (PROTONIX) 40 MG tablet    6/2/2025 06/04/25 0531   traZODone (DESYREL) 50 MG tablet    Taking 08/13/24 0925      Latest Reference Range & Units 05/21/25 09:21   Hemoglobin 12.0 - 16.0 g/dL 12.7   Hematocrit 37.0 - 47.0 % 40.5   Platelet Count 130 - 400 x10(3)/mcL 146   PT 9.3 - 11.4 seconds 10.4   INR <=1.3  1.0   PTT 24.5 - 32.8 seconds 28.4   Sodium 136 - 145 mmol/L 144   Potassium 3.5 - 5.1 mmol/L 4.5   Chloride 98 - 107 mmol/L 108 (H)   CO2 23 - 31 mmol/L 28   Anion Gap mEq/L 8.0   BUN 9.8 - 20.1 mg/dL 13.0   Creatinine 0.55 - 1.02 mg/dL 0.70   BUN/CREAT RATIO  19   eGFR mL/min/1.73/m2 >60   Glucose 82 - 115 mg/dL 104   Calcium 8.4 - 10.2 mg/dL 10.1   TTE 4/26/23   There is no evidence of intracardiac shunting.  Concentric remodeling and normal systolic function.  The estimated ejection fraction is 50-55%.  Grade I left ventricular diastolic dysfunction.  With normal right ventricular systolic function.  Normal central venous pressure (3 mmHg).  EKG 11/1/24   Normal sinus rhythm   Possible Left atrial enlargement   Minimal voltage criteria for LVH, may be normal variant ( Sokolow-Son )     Pre-op Assessment    I have reviewed the Patient Summary Reports.     I have reviewed the Nursing Notes. I have reviewed the NPO Status.   I have reviewed the Medications.     Review of Systems  Anesthesia Hx:  No problems with previous Anesthesia   History of prior surgery of interest to airway management or planning:  Previous anesthesia: General, MAC        Denies Family Hx of Anesthesia complications.    Denies Personal Hx of Anesthesia complications.                    Social:  Smoker, No Alcohol Use, Recreational Drugs Occasional MArijuana        Hematology/Oncology:    Oncology Normal    -- Denies Anemia:                                  EENT/Dental:  EENT/Dental Normal           Cardiovascular:  Exercise tolerance: good   Hypertension       Denies Angina.        ECG has been reviewed.                       Hypertension         Pulmonary:     Denies Asthma.   Denies Shortness of breath.   Denies Sleep Apnea.                Renal/:   Denies Chronic Renal Disease.                Hepatic/GI:     GERD, well controlled    Not Taking GLP-1 Agonists            Musculoskeletal:  Musculoskeletal Normal                Neurological:   CVA, residual symptoms Neuromuscular Disease,                      CVA - Cerebrovasular Accident               Neuromuscular Disease   Endocrine:  Denies Diabetes.         Denies Obesity / BMI > 30  Dermatological:  Skin Normal    Psych:  Psychiatric History                  Physical Exam  General: Well nourished, Cooperative, Alert and Oriented    Airway:  Mallampati: II / I  Mouth Opening: Normal  TM Distance: Normal  Tongue: Normal  Neck ROM: Normal ROM    Dental:  Intact, Loose teeth  [place] represents a loose tooth. She can wiggle it with her finger.   Chest/Lungs:  Clear to auscultation, Normal Respiratory Rate    Heart:  Rate: Normal  Rhythm: Regular Rhythm  Sounds: Normal    Abdomen:  Normal, Soft, Nontender        Anesthesia Plan  Type of Anesthesia, risks & benefits discussed:    Anesthesia Type: Gen ETT  Intra-op Monitoring Plan: Standard ASA Monitors and Art Line  Post Op Pain Control Plan: multimodal analgesia and IV/PO Opioids PRN  Induction:  IV  Airway Plan: Direct, Post-Induction  Informed Consent: Informed consent signed with the Patient and all parties understand the risks and agree with anesthesia plan.  All questions answered. Patient consented to blood products? Yes  ASA Score: 3  Day of Surgery Review of History & Physical: H&P Update referred to the surgeon/provider.    Ready For Surgery From Anesthesia Perspective.     .

## 2025-06-04 NOTE — BRIEF OP NOTE
[facility] - Periop Services  Brief Operative Note    SUMMARY     Surgery Date: 6/4/2025     Surgeons and Role:     * Linda Haley MD - Primary    Assisting Surgeon: Hao Valentin PA-C    Pre-op Diagnosis:  Spinal stenosis in cervical region [M48.02]    Post-op Diagnosis:  Post-Op Diagnosis Codes:     * Spinal stenosis in cervical region [M48.02]    Procedure(s) (LRB):  ARTHRODESIS, SPINE, POSTERIOR SPINAL COLUMN, CERVICAL, W/ COMPUTER-ASSISTED NAVIGATION (N/A)    PCDF C3-T1 using Seaspine screws (Jones C3: 3.5x12; Jones C4: 3.5x12;Rt C5: 3.5x12;Jones C6: 3.5x12;Jones T1: 3.5x20) and osteoamp DBM.     Anesthesia: General    Implants:  Implant Name Type Inv. Item Serial No.  Lot No. LRB No. Used Action   FILLER BONE SYN 1CC PARTIC - SN/A  FILLER BONE SYN 1CC PARTIC N/A [employer] [id number] N/A 2 Implanted   GRAFT BONE OSTEOAMP SELECT 5CC - [id number]  GRAFT BONE OSTEOAMP SELECT 5CC [id number] [employer] N/A N/A 1 Implanted   GRAFT BONE OSTEOAMP SELECT 5CC - [id number]  GRAFT BONE OSTEOAMP SELECT 5CC [id number] BIOVISION N/A N/A 1 Implanted   3.5mm x 12mm   N/A SEASPINE N/A N/A 7 Implanted   3.5mm x 20mm   N/A SEASPINE N/A N/A 2 Implanted   IMPLANT RECORD       1 Implanted       Operative Findings: Dictated    Estimated Blood Loss: * No values recorded between 6/4/2025  7:21 AM and 6/4/2025  9:45 AM *    Estimated Blood Loss has been documented.         Specimens:   Specimen (24h ago, onward)      None          * No specimens in log *    [id number]

## 2025-06-04 NOTE — PLAN OF CARE
Problem: Occupational Therapy  Goal: Occupational Therapy Goal  Description: LTG: Pt will perform basic ADLs and ADL transfers with Modified independence using LRAD by discharge.    STG: to be met by 7/4/25    Pt will complete grooming standing at sink with LRAD with SBA.  Pt will complete UB dressing with SBA.  Pt will complete LB dressing with SBA using LRAD and AE prn.  Pt will complete toileting with SBA using LRAD.  Pt will complete functional mobility to/from toilet and toilet transfer with SBA using LRAD.   Outcome: Progressing

## 2025-06-04 NOTE — ANESTHESIA PROCEDURE NOTES
Intubation    Date/Time: 6/4/2025 7:28 AM    Performed by: Georgiana Torres CRNA  Authorized by: Hung Trevino MD    Intubation:     Induction:  Intravenous    Intubated:  Postinduction    Mask Ventilation:  Easy mask    Attempted By:  CRNA    Method of Intubation:  Video laryngoscopy    Blade:  Jacobs 3    Laryngeal View Grade: Grade I - full view of cords      Difficult Airway Encountered?: No      Complications:  None    Airway Device:  Oral endotracheal tube    Airway Device Size:  7.5    Style/Cuff Inflation:  Cuffed (inflated to minimal occlusive pressure) (inflated to [id number], verified with manometer)    Inflation Amount (mL):  6    Tube secured:  22    Secured at:  The lips    Placement Verified By:  Capnometry    Complicating Factors:  None    Findings Post-Intubation:  BS equal bilateral and atraumatic/condition of teeth unchanged  Notes:      Soft bite blocks placed between molars bilaterally for motor monitoring

## 2025-06-05 LAB
ANION GAP SERPL CALC-SCNC: 7 MEQ/L
BASOPHILS # BLD AUTO: 0.02 X10(3)/MCL
BASOPHILS NFR BLD AUTO: 0.2 %
BUN SERPL-MCNC: 12.1 MG/DL (ref 9.8–20.1)
CALCIUM SERPL-MCNC: 9.6 MG/DL (ref 8.4–10.2)
CHLORIDE SERPL-SCNC: 104 MMOL/L (ref 98–107)
CO2 SERPL-SCNC: 22 MMOL/L (ref 23–31)
CREAT SERPL-MCNC: 0.76 MG/DL (ref 0.55–1.02)
CREAT/UREA NIT SERPL: 16
EOSINOPHIL # BLD AUTO: 0 X10(3)/MCL (ref 0–0.9)
EOSINOPHIL NFR BLD AUTO: 0 %
ERYTHROCYTE [DISTWIDTH] IN BLOOD BY AUTOMATED COUNT: 12.8 % (ref 11.5–17)
GFR SERPLBLD CREATININE-BSD FMLA CKD-EPI: >60 ML/MIN/1.73/M2
GLUCOSE SERPL-MCNC: 128 MG/DL (ref 82–115)
HCT VFR BLD AUTO: 38.8 % (ref 37–47)
HGB BLD-MCNC: 11.8 G/DL (ref 12–16)
IMM GRANULOCYTES # BLD AUTO: 0.02 X10(3)/MCL (ref 0–0.04)
IMM GRANULOCYTES NFR BLD AUTO: 0.2 %
LYMPHOCYTES # BLD AUTO: 1.48 X10(3)/MCL (ref 0.6–4.6)
LYMPHOCYTES NFR BLD AUTO: 18 %
MCH RBC QN AUTO: 31.3 PG (ref 27–31)
MCHC RBC AUTO-ENTMCNC: 30.4 G/DL (ref 33–36)
MCV RBC AUTO: 102.9 FL (ref 80–94)
MONOCYTES # BLD AUTO: 0.87 X10(3)/MCL (ref 0.1–1.3)
MONOCYTES NFR BLD AUTO: 10.6 %
NEUTROPHILS # BLD AUTO: 5.81 X10(3)/MCL (ref 2.1–9.2)
NEUTROPHILS NFR BLD AUTO: 71 %
NRBC BLD AUTO-RTO: 0 %
PLATELET # BLD AUTO: 159 X10(3)/MCL (ref 130–400)
PMV BLD AUTO: 11.9 FL (ref 7.4–10.4)
POTASSIUM SERPL-SCNC: 5.5 MMOL/L (ref 3.5–5.1)
RBC # BLD AUTO: 3.77 X10(6)/MCL (ref 4.2–5.4)
SODIUM SERPL-SCNC: 133 MMOL/L (ref 136–145)
WBC # BLD AUTO: 8.2 X10(3)/MCL (ref 4.5–11.5)

## 2025-06-05 PROCEDURE — 36415 COLL VENOUS BLD VENIPUNCTURE: CPT

## 2025-06-05 PROCEDURE — 80048 BASIC METABOLIC PNL TOTAL CA: CPT

## 2025-06-05 PROCEDURE — 11000001 HC ACUTE MED/SURG PRIVATE ROOM

## 2025-06-05 PROCEDURE — 85025 COMPLETE CBC W/AUTO DIFF WBC: CPT

## 2025-06-05 PROCEDURE — 99900031 HC PATIENT EDUCATION (STAT)

## 2025-06-05 PROCEDURE — 94760 N-INVAS EAR/PLS OXIMETRY 1: CPT

## 2025-06-05 PROCEDURE — 63600175 PHARM REV CODE 636 W HCPCS

## 2025-06-05 PROCEDURE — 94799 UNLISTED PULMONARY SVC/PX: CPT | Mod: XB

## 2025-06-05 PROCEDURE — 25000003 PHARM REV CODE 250

## 2025-06-05 PROCEDURE — 97116 GAIT TRAINING THERAPY: CPT | Mod: CQ

## 2025-06-05 PROCEDURE — 97535 SELF CARE MNGMENT TRAINING: CPT | Mod: CO

## 2025-06-05 PROCEDURE — 99900035 HC TECH TIME PER 15 MIN (STAT)

## 2025-06-05 RX ADMIN — CYCLOBENZAPRINE 10 MG: 10 TABLET, FILM COATED ORAL at 09:06

## 2025-06-05 RX ADMIN — HYDROCODONE BITARTRATE AND ACETAMINOPHEN 1 TABLET: 10; 325 TABLET ORAL at 06:06

## 2025-06-05 RX ADMIN — CEFAZOLIN 2 G: 2 INJECTION, POWDER, FOR SOLUTION INTRAMUSCULAR; INTRAVENOUS at 02:06

## 2025-06-05 RX ADMIN — HYDROCODONE BITARTRATE AND ACETAMINOPHEN 1 TABLET: 10; 325 TABLET ORAL at 05:06

## 2025-06-05 RX ADMIN — HYDROCODONE BITARTRATE AND ACETAMINOPHEN 1 TABLET: 10; 325 TABLET ORAL at 10:06

## 2025-06-05 RX ADMIN — HYDROCODONE BITARTRATE AND ACETAMINOPHEN 1 TABLET: 10; 325 TABLET ORAL at 02:06

## 2025-06-05 RX ADMIN — SENNOSIDES AND DOCUSATE SODIUM 2 TABLET: 50; 8.6 TABLET ORAL at 09:06

## 2025-06-05 RX ADMIN — MUPIROCIN: 20 OINTMENT TOPICAL at 09:06

## 2025-06-05 RX ADMIN — ATORVASTATIN CALCIUM 40 MG: 40 TABLET, FILM COATED ORAL at 09:06

## 2025-06-05 RX ADMIN — BISACODYL 10 MG: 10 SUPPOSITORY RECTAL at 09:06

## 2025-06-05 RX ADMIN — AMLODIPINE BESYLATE 10 MG: 5 TABLET ORAL at 09:06

## 2025-06-05 NOTE — PT/OT/SLP PROGRESS
Occupational Therapy   Treatment    Name: Sandra Stephens  MRN: [id number]    Recommendations:     Recommended therapy intensity at discharge: Low Intensity Therapy   Discharge Equipment Recommendations:  shower chair  Barriers to discharge:       Assessment:     Sandra Stephens is a 69 y.o. female with a medical diagnosis of multilevel stenosis s/p C3-T1 PCDF.  Performance deficits affecting function are weakness, impaired endurance, impaired self care skills, impaired functional mobility, gait instability, impaired balance, decreased upper extremity function, decreased lower extremity function. Pt limited 2/2 neck/back pain with mobility.     Rehab Prognosis:  Good; patient would benefit from acute skilled OT services to address these deficits and reach maximum level of function.       Plan:     Patient to be seen 5 x/week to address the above listed problems via self-care/home management, therapeutic activities, therapeutic exercises  Plan of Care Expires: 07/04/25  Plan of Care Reviewed with: patient    Subjective     Pain/Comfort:  Location 1: neck  Pain Addressed 1: Reposition, Distraction    Objective:     Communicated with: RN prior to session.  Patient found HOB elevated with KANDIS drain, telemetry upon OT entry to room.    General Precautions: Standard, fall    Orthopedic Precautions:spinal precautions  Braces: Cervical collar  Respiratory Status: Room air     Occupational Performance:     Functional Mobility/Transfers:  Bed mobility:    Supine to Sit: contact guard assistance  Sit to Supine: contact guard assistance  Transfers: Sit to Stand: contact guard assistance with rolling walker  Ambulated to sink with CGA and RW. No LOB.     Activities of Daily Living:  Grooming: contact guard assistance standing at sink to complete oral hygiene.     Balance:   Static Sitting Balance: No UE support: Normal: Patient able to maintain steady balance without handhold support.    Therapeutic Positioning    OT interventions  performed during the course of today's session in an effort to prevent and/or reduce acquired pressure injuries:   Education was provided on benefits of and recommendations for therapeutic positioning    [facility] 6 Click ADL: 19    Patient Education:  Patient provided with verbal education education regarding OT role/goals/POC, post op precautions, fall prevention, and safety awareness.  Understanding was verbalized.      Patient left HOB elevated with all lines intact and call button in reach.    GOALS:   Multidisciplinary Problems       Occupational Therapy Goals          Problem: Occupational Therapy    Goal Priority Disciplines Outcome Interventions   Occupational Therapy Goal     OT, PT/OT Progressing    Description: LTG: Pt will perform basic ADLs and ADL transfers with Modified independence using LRAD by discharge.    STG: to be met by 7/4/25    Pt will complete grooming standing at sink with LRAD with SBA.  Pt will complete UB dressing with SBA.  Pt will complete LB dressing with SBA using LRAD and AE prn.  Pt will complete toileting with SBA using LRAD.  Pt will complete functional mobility to/from toilet and toilet transfer with SBA using LRAD.                        Time Tracking:     OT Date of Treatment: 06/05/25  OT Start Time: 1047  OT Stop Time: 1105  OT Total Time (min): 18 min    Billable Minutes:Self Care/Home Management 18    Supervising Occupational Therapist: DANAY Posadas  OT/ORACIO: ORACIO     Number of ORACIO visits since last OT visit: 1    6/5/2025

## 2025-06-05 NOTE — PT/OT/SLP PROGRESS
Physical Therapy Treatment    Patient Name:  Sandra Stephens   MRN:  [id number]    Recommendations:     Discharge therapy intensity: Low Intensity Therapy   Discharge Equipment Recommendations: shower chair  Barriers to discharge: Ongoing medical needs    Assessment:     Sandra Stephens is a 69 y.o. female admitted with a medical diagnosis of  multilevel stenosis s/p C3-T1 PCDF.  She presents with the following impairments/functional limitations: weakness, impaired endurance, impaired self care skills, gait instability, impaired functional mobility, pain, orthopedic precautions. Pt req Mod A for bed mobility and Min A for transfers and pre-gait activities. Increased assistance req for bed mobility due to neck pain and for pt to maintain precautions. Pt lives alone and was independent prior to admission (did not use any ADs for ambulation). Pt plans to go stay with her daughter upon discharge who can provide 24/7 assistance. Recommending low intensity therapy at this time.           Rehab Prognosis: Good; patient would benefit from acute skilled PT services to address these deficits and reach maximum level of function.    Recent Surgery: Procedure(s) (LRB):  ARTHRODESIS, SPINE, POSTERIOR SPINAL COLUMN, CERVICAL, W/ COMPUTER-ASSISTED NAVIGATION (N/A) 1 Day Post-Op    Plan:     During this hospitalization, patient would benefit from acute PT services 6 x/week to address the identified rehab impairments via gait training, therapeutic activities, therapeutic exercises and progress toward the following goals:    Plan of Care Expires:  07/04/25    Subjective     Chief Complaint: pain     Objective:     Communicated with nurse prior to session.  Patient found supine with pulse ox (continuous), telemetry, blood pressure cuff, KANDIS drain, sweeney catheter upon PT entry to room.     General Precautions: Standard, fall  Orthopedic Precautions: spinal precautions  Braces: Cervical collar  Respiratory Status: Room air  Blood Pressure:   Skin  Integrity: Visible skin intact      Functional Mobility:  Min assist to get EOB and same for STS  Gait 50 ft  and 125 ft RW CGA  Nurse to pull catheter and then she only has KANDIS drain.   Pt ready from mobility stand point.     Education Provided:  Role and goals of PT, transfer training, bed mobility, gait training, balance training, safety awareness, assistive device, strengthening exercises, and importance of participating in PT to return to PLOF.     Patient left up in chair with all lines intact    GOALS:   Multidisciplinary Problems       Physical Therapy Goals          Problem: Physical Therapy    Goal Priority Disciplines Outcome Interventions   Physical Therapy Goal     PT, PT/OT Progressing    Description: Goals to be met by: 2025     Patient will increase functional independence with mobility by performin. Supine to sit with Stand-by Assistance  2. Sit to supine with Stand-by Assistance  3. Sit to stand transfer with Modified [place]  4. Bed to chair transfer with Modified [place] using Rolling Walker  5. Gait  x 150 feet with Stand-by Assistance using Rolling Walker.                          Time Tracking:     PT Received On: 25  PT Start Time: 0740     PT Stop Time: 0805  PT Total Time (min): 25 min     Billable Minutes: Gait Training 25    Treatment Type: Treatment  PT/PTA: PTA     Number of PTA visits since last PT visit: 1     2025

## 2025-06-06 PROBLEM — F17.200 TOBACCO DEPENDENCY: Status: ACTIVE | Noted: 2025-06-06

## 2025-06-06 PROCEDURE — S4991 NICOTINE PATCH NONLEGEND: HCPCS | Performed by: NEUROLOGICAL SURGERY

## 2025-06-06 PROCEDURE — 63600175 PHARM REV CODE 636 W HCPCS

## 2025-06-06 PROCEDURE — 25000003 PHARM REV CODE 250: Performed by: NEUROLOGICAL SURGERY

## 2025-06-06 PROCEDURE — 97116 GAIT TRAINING THERAPY: CPT | Mod: CQ

## 2025-06-06 PROCEDURE — 97535 SELF CARE MNGMENT TRAINING: CPT | Mod: CO

## 2025-06-06 PROCEDURE — 11000001 HC ACUTE MED/SURG PRIVATE ROOM

## 2025-06-06 PROCEDURE — 25000003 PHARM REV CODE 250

## 2025-06-06 RX ORDER — IBUPROFEN 200 MG
1 TABLET ORAL DAILY
Status: DISCONTINUED | OUTPATIENT
Start: 2025-06-06 | End: 2025-06-09 | Stop reason: HOSPADM

## 2025-06-06 RX ADMIN — MUPIROCIN: 20 OINTMENT TOPICAL at 08:06

## 2025-06-06 RX ADMIN — SENNOSIDES AND DOCUSATE SODIUM 2 TABLET: 50; 8.6 TABLET ORAL at 08:06

## 2025-06-06 RX ADMIN — ATORVASTATIN CALCIUM 40 MG: 40 TABLET, FILM COATED ORAL at 09:06

## 2025-06-06 RX ADMIN — HYDROCODONE BITARTRATE AND ACETAMINOPHEN 1 TABLET: 10; 325 TABLET ORAL at 07:06

## 2025-06-06 RX ADMIN — AMLODIPINE BESYLATE 10 MG: 5 TABLET ORAL at 08:06

## 2025-06-06 RX ADMIN — CYCLOBENZAPRINE 10 MG: 10 TABLET, FILM COATED ORAL at 09:06

## 2025-06-06 RX ADMIN — MUPIROCIN: 20 OINTMENT TOPICAL at 10:06

## 2025-06-06 RX ADMIN — ENOXAPARIN SODIUM 40 MG: 40 INJECTION SUBCUTANEOUS at 05:06

## 2025-06-06 RX ADMIN — HYDROCODONE BITARTRATE AND ACETAMINOPHEN 1 TABLET: 10; 325 TABLET ORAL at 11:06

## 2025-06-06 RX ADMIN — NICOTINE 1 PATCH: 14 PATCH TRANSDERMAL at 08:06

## 2025-06-06 RX ADMIN — HYDROCODONE BITARTRATE AND ACETAMINOPHEN 1 TABLET: 10; 325 TABLET ORAL at 05:06

## 2025-06-06 RX ADMIN — HYDROCODONE BITARTRATE AND ACETAMINOPHEN 1 TABLET: 10; 325 TABLET ORAL at 12:06

## 2025-06-06 RX ADMIN — HYDROCODONE BITARTRATE AND ACETAMINOPHEN 1 TABLET: 5; 325 TABLET ORAL at 09:06

## 2025-06-06 NOTE — PT/OT/SLP PROGRESS
Occupational Therapy   Treatment    Name: Sandra Stephens  MRN: [id number]    Recommendations:     Recommended therapy intensity at discharge: Low Intensity Therapy   Discharge Equipment Recommendations:  shower chair  Barriers to discharge:       Assessment:     Sandra Stephens is a 69 y.o. female with a medical diagnosis of Spinal stenosis in cervical region.    She presents with increased pain In back and neck however no rating given per pt, RN notified. Pt. Requiring intermittent cueing for safety throughout session, pt. Dizzy and sweating post ambulation to bathroom however vitals, WNL. Pt. Is a fall risk recommending Low intensity therapy 24/hr supervision.  Performance deficits affecting function are weakness, impaired endurance, impaired self care skills, impaired functional mobility, gait instability, impaired balance, decreased upper extremity function, decreased lower extremity function.     Rehab Prognosis:  Good; patient would benefit from acute skilled OT services to address these deficits and reach maximum level of function.       Plan:     Patient to be seen 5 x/week to address the above listed problems via self-care/home management, therapeutic activities, therapeutic exercises  Plan of Care Expires: 07/04/25  Plan of Care Reviewed with: patient    Subjective     Pain/Comfort:  Back/Neck no rating given per pt.     Objective:     Communicated with: RN prior to session.  Patient found HOB elevated with   upon OT entry to room.    General Precautions: Standard, fall    Orthopedic Precautions:spinal precautions  Braces: Cervical collar  Respiratory Status: Room air  Vital Signs: Blood Pressure: 125/87     Occupational Performance:   (Bed Mobility- Mod A) Log roll for adherence to spinal pxns.  (Sitting balance EOB- SBA) for safety with balance.   (Sit to stand- CGA) from EOB  Pt. Ambulating to bathroom with RW CGA for balance with increased cueing required for safety.   Toilet t/f- CGA using grab bar for safe  descend onto low surface, pt. Performing toilet hygiene seated.   Pt. Dizzy and sweating post sit to stand from toilet, pt. T/f to BS chair, B LE elevated ,BP taken WNL, RN notified. Pt. Left UIC with daughter present and all needs within reach.   Therapeutic Positioning    OT interventions performed during the course of today's session in an effort to prevent and/or reduce acquired pressure injuries:   Education was provided on benefits of and recommendations for therapeutic positioning      Patient Education:  Patient provided with verbal education education regarding fall prevention, safety awareness, and pressure ulcer prevention.  Understanding was verbalized.      Patient left up in chair with all lines intact and call button in reach.    GOALS:   Multidisciplinary Problems       Occupational Therapy Goals          Problem: Occupational Therapy    Goal Priority Disciplines Outcome Interventions   Occupational Therapy Goal     OT, PT/OT Progressing    Description: LTG: Pt will perform basic ADLs and ADL transfers with Modified independence using LRAD by discharge.    STG: to be met by 7/4/25    Pt will complete grooming standing at sink with LRAD with SBA.  Pt will complete UB dressing with SBA.  Pt will complete LB dressing with SBA using LRAD and AE prn.  Pt will complete toileting with SBA using LRAD.  Pt will complete functional mobility to/from toilet and toilet transfer with SBA using LRAD.                        Time Tracking:     OT Date of Treatment: 06/06/25  OT Start Time: 0946  OT Stop Time: 1012  OT Total Time (min): 26 min    Billable Minutes:Self Care/Home Management 2    Supervising Occupational Therapist: DANAY Posadas  OT/ORACIO: ORACIO     Number of ORACIO visits since last OT visit: 2    6/6/2025

## 2025-06-06 NOTE — PT/OT/SLP PROGRESS
Physical Therapy Treatment    Patient Name:  Sandra Stephens   MRN:  [id number]    Recommendations:     Discharge therapy intensity: Low Intensity Therapy   Discharge Equipment Recommendations: shower chair  Barriers to discharge: Decreased caregiver support, Impaired mobility, and Ongoing medical needs    Assessment:     Sandra Stephens is a 69 y.o. female admitted with a medical diagnosis of multilevel stenosis s/p C3-T1 PCDF .  She presents with the following impairments/functional limitations: weakness, impaired endurance, impaired self care skills, gait instability, impaired functional mobility, pain, orthopedic precautions.    Rehab Prognosis: Good; patient would benefit from acute skilled PT services to address these deficits and reach maximum level of function.    Recent Surgery: Procedure(s) (LRB):  ARTHRODESIS, SPINE, POSTERIOR SPINAL COLUMN, CERVICAL, W/ COMPUTER-ASSISTED NAVIGATION (N/A) 2 Days Post-Op    Plan:     During this hospitalization, patient would benefit from acute PT services 6 x/week to address the identified rehab impairments via gait training, therapeutic activities, therapeutic exercises and progress toward the following goals:    Plan of Care Expires:  07/04/25    Subjective     Chief Complaint: none stated  Patient/Family Comments/goals: none stated  Pain/Comfort:  Pain Rating 1: 0/10      Objective:     Communicated with nurse prior to session.  Patient found HOB elevated with pulse ox (continuous), telemetry, blood pressure cuff, KANDIS drain, sweeney catheter upon PT entry to room.     General Precautions: Standard, fall  Orthopedic Precautions: spinal precautions  Braces: Cervical collar  Respiratory Status: Room air  Blood Pressure: nt  Skin Integrity: Visible skin intact      Functional Mobility:  Bed Mobility:     Supine to Sit: minimum assistance  Sit to Supine: minimum assistance  Transfers:     Sit to Stand:  minimum assistance with rolling walker  Gait: patient amb 55ft with rolling walker  with CGA with rolling walker. Patient presents with decreased step length.     Co-Treatment: No    Education:  Patient provided with verbal education education regarding PT role/goals/POC, post-op precautions, fall prevention, and safety awareness.  Understanding was verbalized.     Patient left HOB elevated with all lines intact and call button in reach    GOALS:   Multidisciplinary Problems       Physical Therapy Goals          Problem: Physical Therapy    Goal Priority Disciplines Outcome Interventions   Physical Therapy Goal     PT, PT/OT Progressing    Description: Goals to be met by: 2025     Patient will increase functional independence with mobility by performin. Supine to sit with Stand-by Assistance  2. Sit to supine with Stand-by Assistance  3. Sit to stand transfer with Modified [place]  4. Bed to chair transfer with Modified [place] using Rolling Walker  5. Gait  x 150 feet with Stand-by Assistance using Rolling Walker.                          Time Tracking:     PT Received On: 25  PT Start Time: 1705     PT Stop Time: 1717  PT Total Time (min): 12 min     Billable Minutes: Gait Training 12    Treatment Type: Treatment  PT/PTA: PTA     Number of PTA visits since last PT visit: 2     2025

## 2025-06-06 NOTE — PLAN OF CARE
Rounded on patient do discuss HH options. Patient had [facility] in the past, informed CM she will dc to her daughter's home in [place]. Referral sent to [facility] via [facility], consent in chart. Shower chair not covered by insurance provider.

## 2025-06-07 PROCEDURE — 25000003 PHARM REV CODE 250: Performed by: NEUROLOGICAL SURGERY

## 2025-06-07 PROCEDURE — 25000003 PHARM REV CODE 250

## 2025-06-07 PROCEDURE — S4991 NICOTINE PATCH NONLEGEND: HCPCS | Performed by: NEUROLOGICAL SURGERY

## 2025-06-07 PROCEDURE — 63600175 PHARM REV CODE 636 W HCPCS

## 2025-06-07 PROCEDURE — 11000001 HC ACUTE MED/SURG PRIVATE ROOM

## 2025-06-07 RX ADMIN — HYDROCODONE BITARTRATE AND ACETAMINOPHEN 1 TABLET: 10; 325 TABLET ORAL at 11:06

## 2025-06-07 RX ADMIN — HYDROCODONE BITARTRATE AND ACETAMINOPHEN 1 TABLET: 10; 325 TABLET ORAL at 04:06

## 2025-06-07 RX ADMIN — NICOTINE 1 PATCH: 14 PATCH TRANSDERMAL at 08:06

## 2025-06-07 RX ADMIN — HYDROCODONE BITARTRATE AND ACETAMINOPHEN 1 TABLET: 10; 325 TABLET ORAL at 12:06

## 2025-06-07 RX ADMIN — HYDROCODONE BITARTRATE AND ACETAMINOPHEN 1 TABLET: 10; 325 TABLET ORAL at 08:06

## 2025-06-07 RX ADMIN — AMLODIPINE BESYLATE 10 MG: 5 TABLET ORAL at 08:06

## 2025-06-07 RX ADMIN — SENNOSIDES AND DOCUSATE SODIUM 2 TABLET: 50; 8.6 TABLET ORAL at 08:06

## 2025-06-07 RX ADMIN — ATORVASTATIN CALCIUM 40 MG: 40 TABLET, FILM COATED ORAL at 08:06

## 2025-06-07 RX ADMIN — ENOXAPARIN SODIUM 40 MG: 40 INJECTION SUBCUTANEOUS at 05:06

## 2025-06-07 RX ADMIN — MUPIROCIN: 20 OINTMENT TOPICAL at 08:06

## 2025-06-07 RX ADMIN — HYDROCODONE BITARTRATE AND ACETAMINOPHEN 1 TABLET: 10; 325 TABLET ORAL at 05:06

## 2025-06-07 RX ADMIN — CYCLOBENZAPRINE 10 MG: 10 TABLET, FILM COATED ORAL at 08:06

## 2025-06-07 NOTE — ANESTHESIA POSTPROCEDURE EVALUATION
Anesthesia Post Evaluation    Patient: Sandra Stephens    Procedure(s) Performed: Procedure(s) (LRB):  ARTHRODESIS, SPINE, POSTERIOR SPINAL COLUMN, CERVICAL, W/ COMPUTER-ASSISTED NAVIGATION (N/A)    Final Anesthesia Type: general      Patient location during evaluation: PACU  Patient participation: Yes- Able to Participate  Level of consciousness: awake and alert and oriented  Post-procedure vital signs: reviewed and stable  Pain management: adequate  Airway patency: patent    PONV status at discharge: No PONV  Anesthetic complications: no      Cardiovascular status: hemodynamically stable, hypertensive and blood pressure returned to baseline  Respiratory status: unassisted, spontaneous ventilation and room air  Hydration status: euvolemic  Follow-up not needed.              Vitals Value Taken Time   /73 06/07/25 07:34   Temp 36.8 °C (98.3 °F) 06/07/25 07:34   Pulse 82 06/07/25 07:34   Resp 19 06/07/25 07:34   SpO2 97 % 06/07/25 07:34         Event Time   Out of Recovery 12:00:00         Pain/Shanita Score: Pain Rating Prior to Med Admin: 7 (6/7/2025  4:45 AM)  Pain Rating Post Med Admin: 0 (6/6/2025 10:55 PM)

## 2025-06-07 NOTE — PROGRESS NOTES
[facility]  Neurosurgery  Progress Note    Subjective:     Interval History: POD #3 PCDF. No acute events overnight. Minimal KANDIS output. Cervical pain controlled. Has seen improvement of hand numbness compared to prior to surgery. Has been ambulating without issue.     Post-Op Info:  Procedure(s) (LRB):  ARTHRODESIS, SPINE, POSTERIOR SPINAL COLUMN, CERVICAL, W/ COMPUTER-ASSISTED NAVIGATION (N/A)   3 Days Post-Op      Medications:  Continuous Infusions:   0.9% NaCl   Intravenous Continuous         Scheduled Meds:   amLODIPine  10 mg Oral Daily    atorvastatin  40 mg Oral QHS    bisacodyL  10 mg Rectal Daily    enoxparin  40 mg Subcutaneous Daily    mupirocin   Nasal BID    nicotine  1 patch Transdermal Daily    senna-docusate  2 tablet Oral BID     PRN Meds:  Current Facility-Administered Medications:     acetaminophen, 650 mg, Oral, Q6H PRN    acetaminophen, 650 mg, Oral, Q4H PRN    albuterol-ipratropium, 3 mL, Nebulization, Q4H PRN    ALPRAZolam, 1 mg, Oral, BID PRN    aluminum-magnesium hydroxide-simethicone, 30 mL, Oral, Q4H PRN    calcium carbonate, 500 mg, Oral, Daily PRN    cyclobenzaprine, 10 mg, Oral, TID PRN    HYDROcodone-acetaminophen, 1 tablet, Oral, Q4H PRN    HYDROcodone-acetaminophen, 1 tablet, Oral, Q4H PRN    magnesium hydroxide 400 mg/5 ml, 30 mL, Oral, Daily PRN    morphine, 1 mg, Intravenous, Q1H PRN    morphine, 2 mg, Intravenous, Q1H PRN    morphine, 4 mg, Intravenous, Q3H PRN    ondansetron, 8 mg, Oral, Q6H PRN    prochlorperazine, 10 mg, Intravenous, Q6H PRN     Review of Systems  Objective:     Weight: 68.1 kg (150 lb 2.1 oz)  Body mass index is 22.83 kg/m².  Vital Signs (Most Recent):  Temp: 98.8 °F (37.1 °C) (06/07/25 0406)  Pulse: 89 (06/07/25 0406)  Resp: 18 (06/07/25 0406)  BP: 117/75 (06/07/25 0406)  SpO2: 98 % (06/07/25 0406) Vital Signs (24h Range):  Temp:  [98.3 °F (36.8 °C)-98.9 °F (37.2 °C)] 98.8 °F (37.1 °C)  Pulse:  [85-91] 89  Resp:  [18-19]  "18  SpO2:  [96 %-98 %] 98 %  BP: (106-131)/(66-82) 117/75                              Closed/Suction Drain 06/04/25 0910 Tube - 1 Posterior;Right Neck Bulb 10 Fr. (Active)   Site Description Unable to view 06/06/25 2130   Dressing Type Gauze 06/06/25 2130   Dressing Status Old drainage 06/06/25 2130   Dressing Intervention Integrity maintained 06/06/25 2130   Drainage Bloody 06/06/25 2130   Status To bulb suction 06/06/25 2130   Output (mL) 0 mL 06/06/25 2130       Neurosurgery Physical Exam  Awake, alert, oriented.   NAD. Resting in bed.   Moves all extremities well.   KANDIS with minimal bloody output.   Soft collar in place.     Significant Labs:  No results for input(s): "GLU", "NA", "K", "CL", "CO2", "BUN", "CREATININE", "CALCIUM", "MG" in the last 48 hours.  No results for input(s): "WBC", "HGB", "HCT", "PLT" in the last 48 hours.  No results for input(s): "LABPT", "INR", "APTT" in the last 48 hours.  Microbiology Results (last 7 days)       ** No results found for the last 168 hours. **              Assessment/Plan:     Active Diagnoses:    Diagnosis Date Noted POA    PRINCIPAL PROBLEM:  Spinal stenosis in cervical region [M48.02] 06/04/2025 Yes    Tobacco dependency [F17.200] 06/06/2025 Unknown      Problems Resolved During this Admission:     -KANDIS to gravity  -PTOT  -Soft collar  -Ok to shower, dressing changes as needed.   -Home tomorrow     ROSALBA Guzman  Neurosurgery  [facility]    "

## 2025-06-08 PROCEDURE — 11000001 HC ACUTE MED/SURG PRIVATE ROOM

## 2025-06-08 PROCEDURE — 25000003 PHARM REV CODE 250: Performed by: NEUROLOGICAL SURGERY

## 2025-06-08 PROCEDURE — 25000003 PHARM REV CODE 250

## 2025-06-08 PROCEDURE — 63600175 PHARM REV CODE 636 W HCPCS

## 2025-06-08 PROCEDURE — S4991 NICOTINE PATCH NONLEGEND: HCPCS | Performed by: NEUROLOGICAL SURGERY

## 2025-06-08 RX ORDER — GABAPENTIN 300 MG/1
300 CAPSULE ORAL 3 TIMES DAILY
Status: DISCONTINUED | OUTPATIENT
Start: 2025-06-08 | End: 2025-06-09 | Stop reason: HOSPADM

## 2025-06-08 RX ADMIN — HYDROCODONE BITARTRATE AND ACETAMINOPHEN 1 TABLET: 10; 325 TABLET ORAL at 03:06

## 2025-06-08 RX ADMIN — GABAPENTIN 300 MG: 300 CAPSULE ORAL at 03:06

## 2025-06-08 RX ADMIN — MUPIROCIN: 20 OINTMENT TOPICAL at 08:06

## 2025-06-08 RX ADMIN — NICOTINE 1 PATCH: 14 PATCH TRANSDERMAL at 08:06

## 2025-06-08 RX ADMIN — HYDROCODONE BITARTRATE AND ACETAMINOPHEN 1 TABLET: 10; 325 TABLET ORAL at 05:06

## 2025-06-08 RX ADMIN — GABAPENTIN 300 MG: 300 CAPSULE ORAL at 08:06

## 2025-06-08 RX ADMIN — CYCLOBENZAPRINE 10 MG: 10 TABLET, FILM COATED ORAL at 05:06

## 2025-06-08 RX ADMIN — ENOXAPARIN SODIUM 40 MG: 40 INJECTION SUBCUTANEOUS at 07:06

## 2025-06-08 RX ADMIN — SENNOSIDES AND DOCUSATE SODIUM 2 TABLET: 50; 8.6 TABLET ORAL at 08:06

## 2025-06-08 RX ADMIN — HYDROCODONE BITARTRATE AND ACETAMINOPHEN 1 TABLET: 10; 325 TABLET ORAL at 08:06

## 2025-06-08 RX ADMIN — ATORVASTATIN CALCIUM 40 MG: 40 TABLET, FILM COATED ORAL at 08:06

## 2025-06-08 RX ADMIN — AMLODIPINE BESYLATE 10 MG: 5 TABLET ORAL at 08:06

## 2025-06-08 NOTE — PROGRESS NOTES
[facility]  Neurosurgery  Progress Note    Subjective:     Interval History: NAEs overnight. Reports post op cervical pain and intermittent numbness of right arm, improved from prior to surgery. Has been ambulating. KANDIS with less than 10mL output overnight. Has been ambulating.     Post-Op Info:  Procedure(s) (LRB):  ARTHRODESIS, SPINE, POSTERIOR SPINAL COLUMN, CERVICAL, W/ COMPUTER-ASSISTED NAVIGATION (N/A)   4 Days Post-Op      Medications:  Continuous Infusions:  Scheduled Meds:   amLODIPine  10 mg Oral Daily    atorvastatin  40 mg Oral QHS    bisacodyL  10 mg Rectal Daily    enoxparin  40 mg Subcutaneous Daily    mupirocin   Nasal BID    nicotine  1 patch Transdermal Daily    senna-docusate  2 tablet Oral BID     PRN Meds:  Current Facility-Administered Medications:     acetaminophen, 650 mg, Oral, Q6H PRN    acetaminophen, 650 mg, Oral, Q4H PRN    albuterol-ipratropium, 3 mL, Nebulization, Q4H PRN    ALPRAZolam, 1 mg, Oral, BID PRN    aluminum-magnesium hydroxide-simethicone, 30 mL, Oral, Q4H PRN    calcium carbonate, 500 mg, Oral, Daily PRN    cyclobenzaprine, 10 mg, Oral, TID PRN    HYDROcodone-acetaminophen, 1 tablet, Oral, Q4H PRN    HYDROcodone-acetaminophen, 1 tablet, Oral, Q4H PRN    magnesium hydroxide 400 mg/5 ml, 30 mL, Oral, Daily PRN    morphine, 1 mg, Intravenous, Q1H PRN    morphine, 2 mg, Intravenous, Q1H PRN    morphine, 4 mg, Intravenous, Q3H PRN    ondansetron, 8 mg, Oral, Q6H PRN    prochlorperazine, 10 mg, Intravenous, Q6H PRN     Review of Systems  Objective:     Weight: 68.1 kg (150 lb 2.1 oz)  Body mass index is 22.83 kg/m².  Vital Signs (Most Recent):  Temp: 98.9 °F (37.2 °C) (06/08/25 0429)  Pulse: 86 (06/08/25 0429)  Resp: 18 (06/08/25 0501)  BP: 116/75 (06/08/25 0429)  SpO2: 97 % (06/08/25 0429) Vital Signs (24h Range):  Temp:  [97.5 °F (36.4 °C)-98.9 °F (37.2 °C)] 98.9 °F (37.2 °C)  Pulse:  [81-91] 86  Resp:  [18-19] 18  SpO2:  [97 %-98 %] 97 %  BP:  "(116-131)/(73-82) 116/75                              Closed/Suction Drain 06/04/25 0910 Tube - 1 Posterior;Right Neck Bulb 10 Fr. (Active)   Site Description Unable to view 06/07/25 2000   Dressing Type Gauze 06/07/25 2000   Dressing Status Clean;Dry;Intact 06/07/25 2000   Dressing Intervention Integrity maintained 06/07/25 2000   Drainage Serosanguineous 06/07/25 2000   Status Open to gravity drainage 06/07/25 2000   Output (mL) 0 mL 06/06/25 2130       Neurosurgery Physical Exam  Awake, alert, oriented.   NAD. Resting in bed.   Moves all extremities well.   KANDIS with minimal bloody output.   Soft collar in place.     Significant Labs:  No results for input(s): "GLU", "NA", "K", "CL", "CO2", "BUN", "CREATININE", "CALCIUM", "MG" in the last 48 hours.  No results for input(s): "WBC", "HGB", "HCT", "PLT" in the last 48 hours.  No results for input(s): "LABPT", "INR", "APTT" in the last 48 hours.  Microbiology Results (last 7 days)       ** No results found for the last 168 hours. **              Assessment/Plan:     Active Diagnoses:    Diagnosis Date Noted POA    PRINCIPAL PROBLEM:  Spinal stenosis in cervical region [M48.02] 06/04/2025 Yes    Tobacco dependency [F17.200] 06/06/2025 Unknown      Problems Resolved During this Admission:     -Home today  -KANDIS removal  -Prescription on chart    ROSALBA Guzman  Neurosurgery  [facility]    "

## 2025-06-09 VITALS
HEIGHT: 68 IN | DIASTOLIC BLOOD PRESSURE: 75 MMHG | WEIGHT: 150.13 LBS | TEMPERATURE: 98 F | HEART RATE: 79 BPM | BODY MASS INDEX: 22.75 KG/M2 | SYSTOLIC BLOOD PRESSURE: 110 MMHG | RESPIRATION RATE: 18 BRPM | OXYGEN SATURATION: 97 %

## 2025-06-09 PROCEDURE — S4991 NICOTINE PATCH NONLEGEND: HCPCS | Performed by: NEUROLOGICAL SURGERY

## 2025-06-09 PROCEDURE — 25000003 PHARM REV CODE 250: Performed by: NEUROLOGICAL SURGERY

## 2025-06-09 PROCEDURE — 25000003 PHARM REV CODE 250

## 2025-06-09 RX ADMIN — HYDROCODONE BITARTRATE AND ACETAMINOPHEN 1 TABLET: 10; 325 TABLET ORAL at 04:06

## 2025-06-09 RX ADMIN — HYDROCODONE BITARTRATE AND ACETAMINOPHEN 1 TABLET: 10; 325 TABLET ORAL at 12:06

## 2025-06-09 RX ADMIN — NICOTINE 1 PATCH: 14 PATCH TRANSDERMAL at 08:06

## 2025-06-09 RX ADMIN — BISACODYL 10 MG: 10 SUPPOSITORY RECTAL at 08:06

## 2025-06-09 RX ADMIN — GABAPENTIN 300 MG: 300 CAPSULE ORAL at 08:06

## 2025-06-09 RX ADMIN — MAGNESIUM HYDROXIDE 2400 MG: 400 SUSPENSION ORAL at 10:06

## 2025-06-09 RX ADMIN — SENNOSIDES AND DOCUSATE SODIUM 2 TABLET: 50; 8.6 TABLET ORAL at 08:06

## 2025-06-09 RX ADMIN — HYDROCODONE BITARTRATE AND ACETAMINOPHEN 1 TABLET: 10; 325 TABLET ORAL at 10:06

## 2025-06-09 RX ADMIN — AMLODIPINE BESYLATE 10 MG: 5 TABLET ORAL at 08:06

## 2025-06-09 NOTE — DISCHARGE INSTRUCTIONS
Make sure to attend follow up with Dr. Haley on 6/19 @12:30PM     Okay to leave dressing open to air - do not remove steri strips, let them fall of on their own     Medications sent to [facility] pharmacy - they will deliver to bedside before discharge     No heavy lifting, bending or twisting     Make sure to have a bowel movement often while taking pain medications - take over the counter stool softeners if needed

## 2025-06-09 NOTE — PLAN OF CARE
06/09/25 0946   Final Note   Assessment Type Final Discharge Note   Anticipated Discharge Disposition Home-Health   Hospital Resources/Appts/Education Provided Post-Acute resouces added to AVS   Post-Acute Status   Post-Acute Authorization Home Health   Home Health Status Set-up Complete/Auth obtained   Discharge Delays None known at this time     Pt to d/c home. HH arranged with [facility]- d/c info sent. No further CM needs known at this time.     Amanda Curtis LCSW

## 2025-06-09 NOTE — NURSING
Discharge instructions given to patient at bedside. All questions answered, verbalized understanding. IV taken out. Medications delivered, follow up appointments made. Patient discharged home with home health

## 2025-06-11 ENCOUNTER — PATIENT OUTREACH (OUTPATIENT)
Dept: ADMINISTRATIVE | Facility: CLINIC | Age: 70
End: 2025-06-11
Payer: MEDICARE

## 2025-06-11 NOTE — PROGRESS NOTES
C3 nurse spoke with Sandra Stephens  for a TCC post hospital discharge follow up call. The patient has a scheduled HOSFU appointment with Dr. Haley on 06/19/2025 @ 1230 pm. Advised patient to call Gerber Siu MD regarding follow up appointment; verbalized understanding. [facility]

## (undated) DEVICE — DRAPE OPMI STERILE

## (undated) DEVICE — DRAPE SURG W/TWL 17 5/8X23

## (undated) DEVICE — PENCIL SMOKE EVAC TELSCP 15FT

## (undated) DEVICE — ELECTRODE BLD EXT 6.50 ST DISP

## (undated) DEVICE — SUT VICRYL 4-0 18 P-3

## (undated) DEVICE — SHEET AVIENE MICFIB 1.4X1.4IN

## (undated) DEVICE — DRAPE TOP 53X102IN

## (undated) DEVICE — BIT DRILL ADJUSTABLE

## (undated) DEVICE — NDL HYPO 22GX1 1/2 SYR 10ML LL

## (undated) DEVICE — DRESSING TELFA N ADH 3X8

## (undated) DEVICE — DRAPE SURG LAP INCISE ADHESIVE

## (undated) DEVICE — PAD DERMAPROX XL 22X14X.5IN

## (undated) DEVICE — TUBING SILICON CLR 3/16IN 10FT

## (undated) DEVICE — CLOSURE SKIN STERI STRIP 1/2X4

## (undated) DEVICE — KIT SURGIFLO HEMOSTATIC MATRIX

## (undated) DEVICE — TRAY CATH 1-LYR URIMTR 16FR

## (undated) DEVICE — SUT BONE WAX 2.5 GRMS 12/BX

## (undated) DEVICE — RESERVOIR JACKSON-PRATT 100CC

## (undated) DEVICE — TUBE SUCTION FRAZIER VENT 10FR

## (undated) DEVICE — ELECTRODE BLADE E-Z CLEAN 4IN

## (undated) DEVICE — KIT SURGICAL TURNOVER

## (undated) DEVICE — APPLICATOR CHLORAPREP ORN 26ML

## (undated) DEVICE — COVER HD BACK TABLE 6FT

## (undated) DEVICE — BUR BONE CUT MICRO TPS 3X3.8MM

## (undated) DEVICE — BIT DRILL WIRE PASS 1.7MM

## (undated) DEVICE — DRAIN ROUND SUCTION 10FR

## (undated) DEVICE — Device

## (undated) DEVICE — DRAPE O-ARM STERILE

## (undated) DEVICE — GLOVE PROTEXIS LTX MICRO  7.5

## (undated) DEVICE — SOL NACL IRR 1000ML BTL

## (undated) DEVICE — KIT POS JACKSON TABLE NO HDRST

## (undated) DEVICE — GLOVE PROTEXIS BLUE LATEX 8

## (undated) DEVICE — ADHESIVE MASTISOL VIAL 48/BX

## (undated) DEVICE — SUT VICRYL PLUS 0 CT-1 18IN

## (undated) DEVICE — SPHERE NDI PASSIVE

## (undated) DEVICE — SUT VICRYL PLUS 3-0 X-1 18IN